# Patient Record
Sex: MALE | Race: WHITE | NOT HISPANIC OR LATINO | Employment: UNEMPLOYED | URBAN - METROPOLITAN AREA
[De-identification: names, ages, dates, MRNs, and addresses within clinical notes are randomized per-mention and may not be internally consistent; named-entity substitution may affect disease eponyms.]

---

## 2017-01-31 ENCOUNTER — TELEPHONE (OUTPATIENT)
Dept: FAMILY MEDICINE | Facility: OTHER | Age: 67
End: 2017-01-31

## 2017-01-31 NOTE — Clinical Note
[address]   [facility] [address]  Phone: [phone]  February 1, 2017      Genaro Barbosa  [address] [facility] [address]      Dear Genaro,    We care about your health and have reviewed your health plan including your medical conditions, medications, and lab results.  Based on this review, it is recommended that you follow up regarding the following health topic(s):  -Diabetes    We recommend you take the following action(s):  -schedule a FOLLOWUP OFFICE APPOINTMENT.  We will perform the following labs:  A1c, Lipids (fasting cholesterol - nothing to eat except water and/or meds for 8-10 hours), CMP(complete metabolic panel) and Microablumin.     Please call us at the [facility] - [phone] (or use [facility]) to address the above recommendations.     Thank you for trusting [facility] and we appreciate the opportunity to serve you.  We look forward to supporting your healthcare needs in the future.    Healthy Regards,    Your Health Care Team  [facility]

## 2017-01-31 NOTE — TELEPHONE ENCOUNTER
Summary:    Patient is due/failing the following:   Eye and foot exam, CMP, Microalbumin, A1C, FOLLOW UP, FIT and LDL    Action needed:   Patient needs office visit for diabetic follow up., Patient needs fasting lab only appointment and complete a FIT test     Type of outreach:    Phone, left message for patient to call back.     Questions for provider review:    None                                                                                                                                    Judith Arriola       Chart routed to Care Team .        Panel Management Review      Patient has the following on his problem list:     Diabetes    ASA: Passed    Last A1C  A1C      7.0   5/9/2016  A1C      6.9   11/5/2015  A1C      7.2   4/22/2015  A1C      6.7   9/16/2014  A1C      7.3   4/16/2014  A1C tested: Passed    Last LDL:    CHOL      102   5/9/2016  HDL       50   5/9/2016  LDL       35   5/9/2016  TRIG       86   5/9/2016  CHOLHDLRATIO      3.6   4/22/2015  NHDL       52   5/9/2016    Is the patient on a Statin? YES             Is the patient on Aspirin? YES    Medications     HMG CoA Reductase Inhibitors    rosuvastatin (CRESTOR) 40 MG tablet    Salicylates    ASPIRIN 325 MG OR TBEC          Last three blood pressure readings:  BP Readings from Last 3 Encounters:   12/21/16 157/69   12/12/16 130/84   10/03/16 130/70        Tobacco History:     History   Smoking status     Former Smoker   Smokeless tobacco     Never Used     Comment: quit age 26           Hypertension   Last three blood pressure readings:  BP Readings from Last 3 Encounters:   12/21/16 157/69   12/12/16 130/84   10/03/16 130/70     Blood pressure: Failed    HTN Guidelines:  Age 18-59 BP range:  Less than 140/90  Age 60-85 with Diabetes:  Less than 140/90  Age 60-85 without Diabetes:  less than 150/90      Composite cancer screening  Chart review shows that this patient is due/due soon for the following Fecal Colorectal (FIT)

## 2017-03-07 ENCOUNTER — TELEPHONE (OUTPATIENT)
Dept: FAMILY MEDICINE | Facility: OTHER | Age: 67
End: 2017-03-07
Payer: COMMERCIAL
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
## 2017-04-24 ENCOUNTER — OFFICE VISIT (OUTPATIENT)
Dept: FAMILY MEDICINE | Facility: OTHER | Age: 67
End: 2017-04-24
Payer: COMMERCIAL

## 2017-04-24 VITALS
DIASTOLIC BLOOD PRESSURE: 84 MMHG | TEMPERATURE: 99.7 F | SYSTOLIC BLOOD PRESSURE: 132 MMHG | RESPIRATION RATE: 18 BRPM | WEIGHT: 253 LBS | HEART RATE: 68 BPM | HEIGHT: 71 IN | BODY MASS INDEX: 35.42 KG/M2

## 2017-04-24 DIAGNOSIS — Z23 NEED FOR PROPHYLACTIC VACCINATION AGAINST STREPTOCOCCUS PNEUMONIAE (PNEUMOCOCCUS): ICD-10-CM

## 2017-04-24 DIAGNOSIS — Z13.89 SCREENING FOR DIABETIC PERIPHERAL NEUROPATHY: ICD-10-CM

## 2017-04-24 DIAGNOSIS — I25.10 CORONARY ARTERY DISEASE INVOLVING NATIVE HEART WITHOUT ANGINA PECTORIS, UNSPECIFIED VESSEL OR LESION TYPE: ICD-10-CM

## 2017-04-24 DIAGNOSIS — E11.59 TYPE 2 DIABETES MELLITUS WITH OTHER CIRCULATORY COMPLICATIONS (H): Primary | ICD-10-CM

## 2017-04-24 DIAGNOSIS — M25.519 SHOULDER PAIN, UNSPECIFIED CHRONICITY, UNSPECIFIED LATERALITY: ICD-10-CM

## 2017-04-24 DIAGNOSIS — Z11.59 NEED FOR HEPATITIS C SCREENING TEST: ICD-10-CM

## 2017-04-24 DIAGNOSIS — Z12.11 SCREEN FOR COLON CANCER: ICD-10-CM

## 2017-04-24 LAB — HBA1C MFR BLD: 7.2 % (ref 4.3–6)

## 2017-04-24 PROCEDURE — 80048 BASIC METABOLIC PNL TOTAL CA: CPT | Performed by: FAMILY MEDICINE

## 2017-04-24 PROCEDURE — 83036 HEMOGLOBIN GLYCOSYLATED A1C: CPT | Performed by: FAMILY MEDICINE

## 2017-04-24 PROCEDURE — 82043 UR ALBUMIN QUANTITATIVE: CPT | Performed by: FAMILY MEDICINE

## 2017-04-24 PROCEDURE — 99207 C FOOT EXAM  NO CHARGE: CPT | Performed by: FAMILY MEDICINE

## 2017-04-24 PROCEDURE — 99214 OFFICE O/P EST MOD 30 MIN: CPT | Mod: GW | Performed by: FAMILY MEDICINE

## 2017-04-24 PROCEDURE — 36415 COLL VENOUS BLD VENIPUNCTURE: CPT | Performed by: FAMILY MEDICINE

## 2017-04-24 RX ORDER — GLYBURIDE 5 MG/1
TABLET ORAL
Qty: 360 TABLET | Refills: 0 | Status: SHIPPED | OUTPATIENT
Start: 2017-04-24 | End: 2017-07-22

## 2017-04-24 RX ORDER — PIOGLITAZONEHYDROCHLORIDE 45 MG/1
45 TABLET ORAL DAILY
Qty: 90 TABLET | Refills: 0 | Status: SHIPPED | OUTPATIENT
Start: 2017-04-24 | End: 2017-07-22

## 2017-04-24 ASSESSMENT — PAIN SCALES - GENERAL: PAINLEVEL: NO PAIN (0)

## 2017-04-24 NOTE — NURSING NOTE
"Chief Complaint   Patient presents with     Diabetes     Panel Management     A1C, Micro, Eye Exam, Foot Exam, CMP, Lipid, Prevnar, Colon, Honoring Choices, Hep C, Fall Assessment       Initial /84 (BP Location: Left arm, Patient Position: Chair, Cuff Size: Adult Large)  Pulse 68  Temp 99.7  F (37.6  C) (Temporal)  Resp 18  Ht 5' 11\" (1.803 m)  Wt 253 lb (114.8 kg)  BMI 35.29 kg/m2 Estimated body mass index is 35.29 kg/(m^2) as calculated from the following:    Height as of this encounter: 5' 11\" (1.803 m).    Weight as of this encounter: 253 lb (114.8 kg).  Medication Reconciliation: complete  "

## 2017-04-24 NOTE — PATIENT INSTRUCTIONS
Lifestyle changes regarding your diet and exercise can help during your winter months. Diabetic education referral.    -Follow up in 3 months.  -Hep C screening during next labs.

## 2017-04-24 NOTE — MR AVS SNAPSHOT
After Visit Summary   4/24/2017    Genaro Barbosa    MRN: [id number]           Patient Information     Date Of Birth          1950        Visit Information        Provider Department      4/24/2017 3:30 PM Mayelin Hernandez MD [facility]        Today's Diagnoses     Type 2 diabetes mellitus with other circulatory complications (H)    -  1    Screen for colon cancer        Need for hepatitis C screening test        Screening for diabetic peripheral neuropathy        Need for prophylactic vaccination against Streptococcus pneumoniae (pneumococcus)        Coronary artery disease involving native heart without angina pectoris, unspecified vessel or lesion type        Shoulder pain, unspecified chronicity, unspecified laterality          Care Instructions    Lifestyle changes regarding your diet and exercise can help during your winter months. Diabetic education referral.    -Follow up in 3 months.  -Hep C screening during next labs.        Follow-ups after your visit        Additional Services     DIABETES EDUCATOR REFERRAL       DIABETES SELF MANAGEMENT TRAINING (DSMT)      Your provider has referred you to Diabetes Education: [facility]: Diabetes Education - [facility] [phone]   [email or web address]    Type of training and number of hours: Previous Diagnosis: Follow-up DSMT - 2 hours.    Medicare covers: 10 hours of initial DSMT in 12 month period from the time of first visit, plus 2 hours of follow-up DSMT annually, and additional hours as requested for insulin training.    Diabetes Type: Type 2 - On Oral Medication             Diabetes Co-Morbidities: atherosclerotic cardiovascular disease               A1C Goal:  <7.0       A1C is: Lab Results       Component                Value               Date                       A1C                      7.2                 04/24/2017              If an urgent visit is needed or A1C is  above 12, Care Team to call the Diabetes Education Team at [phone] or send an In Basket message to the Diabetes Education Pool (P DIAB ED-PATIENT CARE).    Diabetes Education Topics: Comprehensive Knowledge Assessment and Instruction    Special Educational Needs Requiring Individual DSMT: None       MEDICAL NUTRITION THERAPY (MNT) for Diabetes    Medical Nutrition Therapy with a Registered Dietitian can be provided in coordination with Diabetes Self-Management Training to assist in achieving optimal diabetes management.    MNT Type and Hours: Previous diagnosis: Annual follow-up MNT - 2 hours                       Medicare will cover: 3 hours initial MNT in 12 month period after first visit, plus 2 hours of follow-up MNT annually    Please be aware that coverage of these services is subject to the terms and limitations of your health insurance plan.  Call member services at your health plan to determine Diabetes Self-Management Training benefits and ask which blood glucose monitor brands are covered by your plan.      Please bring the following with you to your appointment:    (1)  List of current medications   (2)  List of Blood Glucose Monitor brands that are covered by your insurance plan  (3)  Blood Glucose Monitor and log book  (4)   Food records for the 3 days prior to your visit    The Certified Diabetes Educator may make diabetes medication adjustments per the CDE Protocol and Collaborative Practice Agreement.            ORTHOPEDICS ADULT REFERRAL       Your provider has referred you to: [facility]: [facility]  [phone]   [email or web address]    Please be aware that coverage of these services is subject to the terms and limitations of your health insurance plan.  Call member services at your health plan with any benefit or coverage questions.      Please bring the following to your appointment:    >>   Any x-rays, CTs or MRIs which have been performed.  Contact  the facility where they were done to arrange for  prior to your scheduled appointment.    >>   List of current medications   >>   This referral request   >>   Any documents/labs given to you for this referral                  Future tests that were ordered for you today     Open Future Orders        Priority Expected Expires Ordered    Fecal colorectal cancer screen (FIT) Routine 5/15/2017 7/17/2017 4/24/2017    Lipid Profile with reflex to direct LDL Routine  5/24/2017 4/24/2017    Hepatitis C Screen Reflex to HCV RNA Quant and Genotype Routine  5/24/2017 4/24/2017            Who to contact     If you have questions or need follow up information about today's clinic visit or your schedule please contact [facility] directly at [phone].  Normal or non-critical lab and imaging results will be communicated to you by MyChart, letter or phone within 4 business days after the clinic has received the results. If you do not hear from us within 7 days, please contact the clinic through [employer] or phone. If you have a critical or abnormal lab result, we will notify you by phone as soon as possible.  Submit refill requests through [employer] or call your pharmacy and they will forward the refill request to us. Please allow 3 business days for your refill to be completed.          Additional Information About Your Visit        [employer] Information     [employer] gives you secure access to your electronic health record. If you see a primary care provider, you can also send messages to your care team and make appointments. If you have questions, please call your primary care clinic.  If you do not have a primary care provider, please call [phone] and they will assist you.        Care EveryWhere ID     This is your Care EveryWhere ID. This could be used by other organizations to access your [place] medical records  [id number]        Your Vitals Were     Pulse Temperature Respirations Height BMI (Body  "Mass Index)       68 99.7  F (37.6  C) (Temporal) 18 5' 11\" (1.803 m) 35.29 kg/m2        Blood Pressure from Last 3 Encounters:   04/24/17 132/84   12/21/16 157/69   12/12/16 130/84    Weight from Last 3 Encounters:   04/24/17 253 lb (114.8 kg)   12/21/16 256 lb (116.1 kg)   12/12/16 253 lb 8 oz (115 kg)              We Performed the Following     Basic metabolic panel     DIABETES EDUCATOR REFERRAL     FOOT EXAM  NO CHARGE [50760.114]     Hemoglobin A1c     Microalbumin quantitative random urine     ORTHOPEDICS ADULT REFERRAL          Today's Medication Changes          These changes are accurate as of: 4/24/17  4:11 PM.  If you have any questions, ask your nurse or doctor.               These medicines have changed or have updated prescriptions.        Dose/Directions    glyBURIDE 5 MG tablet   Commonly known as:  DIABETA /MICRONASE   This may have changed:  See the new instructions.   Used for:  Type 2 diabetes mellitus with other circulatory complications (H)   Changed by:  Mayelin Hernandez MD        TAKE TWO TABLETS BY MOUTH TWICE DAILY WITH MEALS   Quantity:  360 tablet   Refills:  0       metFORMIN 500 MG tablet   Commonly known as:  GLUCOPHAGE   This may have changed:  See the new instructions.   Used for:  Type 2 diabetes mellitus with other circulatory complications (H)   Changed by:  Mayelin Hernandez MD        TAKE TWO TABLETS BY MOUTH TWICE DAILY WITH MEALS   Quantity:  360 tablet   Refills:  0       pioglitazone 45 MG tablet   Commonly known as:  ACTOS   This may have changed:  See the new instructions.   Used for:  Type 2 diabetes mellitus with other circulatory complications (H)   Changed by:  Mayelin Hernandez MD        Dose:  45 mg   Take 1 tablet (45 mg) by mouth daily   Quantity:  90 tablet   Refills:  0         Stop taking these medicines if you haven't already. Please contact your care team if you have questions.     fluticasone 50 MCG/ACT spray   Commonly known as:  FLONASE   Stopped by:  " Mayelin Hernandez MD           lisinopril-hydrochlorothiazide 20-25 MG per tablet   Commonly known as:  PRINZIDE/ZESTORETIC   Stopped by:  Mayelin Hernandez MD           omeprazole 20 MG tablet   Stopped by:  Mayelin Hernandez MD                Where to get your medicines      These medications were sent to [facility] [address] [facility] - [place], [place] - [address]  [address] [address]     Phone:  [phone]     blood glucose monitoring test strip    glyBURIDE 5 MG tablet    metFORMIN 500 MG tablet    pioglitazone 45 MG tablet                Primary Care Provider Office Phone # Fax #    Mayelin Hernandez [phone]       [facility]  [address] [facility] [address]        Thank you!     Thank you for choosing [facility]  for your care. Our goal is always to provide you with excellent care. Hearing back from our patients is one way we can continue to improve our services. Please take a few minutes to complete the written survey that you may receive in the mail after your visit with us. Thank you!             Your Updated Medication List - Protect others around you: Learn how to safely use, store and throw away your medicines at www.disposemymeds.org.          This list is accurate as of: 4/24/17  4:11 PM.  Always use your most recent med list.                   Brand Name Dispense Instructions for use    aspirin 325 MG EC tablet     0    1 tab po QD (Once per day)       blood glucose monitoring test strip    no brand specified    3 Box    1 strip by In Vitro route 2 times daily Test as directed       clopidogrel 75 MG tablet    PLAVIX    90 tablet    TAKE  ONE TABLET BY MOUTH EVERY DAY       CRESTOR 40 MG tablet   Generic drug:  rosuvastatin     90 tablet    Take 1 tablet (40 mg) by mouth daily       glyBURIDE 5 MG tablet    DIABETA /MICRONASE    360 tablet    TAKE TWO TABLETS BY MOUTH TWICE DAILY WITH MEALS       ibuprofen 200 MG tablet    ADVIL/MOTRIN      Take 1,000 mg by mouth nightly as needed.       lisinopril 20 MG tablet    PRINIVIL/ZESTRIL    90 tablet    Take 1 tablet (20 mg) by mouth daily       metFORMIN 500 MG tablet    GLUCOPHAGE    360 tablet    TAKE TWO TABLETS BY MOUTH TWICE DAILY WITH MEALS       metoprolol 50 MG tablet    LOPRESSOR    180 tablet    Take 1 tablet (50 mg) by mouth 2 times daily       order for DME     1 packet    Glucose test strips Use 1-2 x daily       pioglitazone 45 MG tablet    ACTOS    90 tablet    Take 1 tablet (45 mg) by mouth daily       Pycnogenol 50 MG Caps     120 Cap    taking OPC3 2 caps per day

## 2017-04-25 LAB
ANION GAP SERPL CALCULATED.3IONS-SCNC: 7 MMOL/L (ref 3–14)
BUN SERPL-MCNC: 15 MG/DL (ref 7–30)
CALCIUM SERPL-MCNC: 8.7 MG/DL (ref 8.5–10.1)
CHLORIDE SERPL-SCNC: 106 MMOL/L (ref 94–109)
CO2 SERPL-SCNC: 27 MMOL/L (ref 20–32)
CREAT SERPL-MCNC: 0.94 MG/DL (ref 0.66–1.25)
CREAT UR-MCNC: 126 MG/DL
GFR SERPL CREATININE-BSD FRML MDRD: 80 ML/MIN/1.7M2
GLUCOSE SERPL-MCNC: 193 MG/DL (ref 70–99)
MICROALBUMIN UR-MCNC: 89 MG/L
MICROALBUMIN/CREAT UR: 70.95 MG/G CR (ref 0–17)
POTASSIUM SERPL-SCNC: 4.1 MMOL/L (ref 3.4–5.3)
SODIUM SERPL-SCNC: 140 MMOL/L (ref 133–144)

## 2017-04-26 ENCOUNTER — TELEPHONE (OUTPATIENT)
Dept: EDUCATION SERVICES | Facility: OTHER | Age: 67
End: 2017-04-26

## 2017-04-26 NOTE — TELEPHONE ENCOUNTER
Message received from Dr. Hernandez to contact Genaro and schedule for diabetes education.    LM message for Genaro to contact diabetes education to schedule an appointment ([phone]).    Katie Siu RD, LD, CDE

## 2017-04-26 NOTE — PROGRESS NOTES
Genaro, your results show worsening kidney functions.  Please schedule with diabetic ed to help get under control.  Please let me know if you have any questions.    Mayelin Hernandez MD

## 2017-04-27 ENCOUNTER — MYC MEDICAL ADVICE (OUTPATIENT)
Dept: FAMILY MEDICINE | Facility: OTHER | Age: 67
End: 2017-04-27

## 2017-04-29 PROCEDURE — 82274 ASSAY TEST FOR BLOOD FECAL: CPT | Performed by: FAMILY MEDICINE

## 2017-04-30 DIAGNOSIS — E11.59 TYPE 2 DIABETES MELLITUS WITH OTHER CIRCULATORY COMPLICATIONS (H): ICD-10-CM

## 2017-05-01 ENCOUNTER — MYC MEDICAL ADVICE (OUTPATIENT)
Dept: FAMILY MEDICINE | Facility: OTHER | Age: 67
End: 2017-05-01

## 2017-05-01 DIAGNOSIS — Z12.11 SCREEN FOR COLON CANCER: ICD-10-CM

## 2017-05-01 NOTE — TELEPHONE ENCOUNTER
Duplicate request?    Pioglitazone 45 mg         Last Written Prescription Date: 04/24/2017  Last Fill Quantity: 90, # refills: 0  Last Office Visit with [facility], [facility] or [facility] prescribing provider:  04/24/2017        BP Readings from Last 3 Encounters:   04/24/17 132/84   12/21/16 157/69   12/12/16 130/84     Lab Results   Component Value Date    MICROL 89 04/24/2017     Lab Results   Component Value Date    UMALCR 70.95 04/24/2017     Creatinine   Date Value Ref Range Status   04/24/2017 0.94 0.66 - 1.25 mg/dL Final   ]  GFR Estimate   Date Value Ref Range Status   04/24/2017 80 >60 mL/min/1.7m2 Final     Comment:     Non  GFR Calc   11/05/2015 76 >60 mL/min/1.7m2 Final     Comment:     Non  GFR Calc   09/16/2014 78 >60 mL/min/1.7m2 Final     Comment:     Non  GFR Calc     GFR Estimate If Black   Date Value Ref Range Status   04/24/2017 >90   GFR Calc   >60 mL/min/1.7m2 Final   11/05/2015 >90   GFR Calc   >60 mL/min/1.7m2 Final   09/16/2014 >90   GFR Calc   >60 mL/min/1.7m2 Final     Lab Results   Component Value Date    CHOL 102 05/09/2016     Lab Results   Component Value Date    HDL 50 05/09/2016     Lab Results   Component Value Date    LDL 35 05/09/2016     Lab Results   Component Value Date    TRIG 86 05/09/2016     Lab Results   Component Value Date    CHOLHDLRATIO 3.6 04/22/2015     Lab Results   Component Value Date    AST 23 11/05/2015     Lab Results   Component Value Date    ALT 31 11/05/2015     Lab Results   Component Value Date    A1C 7.2 04/24/2017    A1C 7.0 05/09/2016    A1C 6.9 11/05/2015    A1C 7.2 04/22/2015    A1C 6.7 09/16/2014     Potassium   Date Value Ref Range Status   04/24/2017 4.1 3.4 - 5.3 mmol/L Final     Metformin 500 mg         Last Written Prescription Date: 04/24/2017  Last Fill Quantity: 360, # refills: 0  Last Office Visit with [facility], [facility] or [facility] prescribing provider:  04/24/2017         BP Readings from Last 3 Encounters:   04/24/17 132/84   12/21/16 157/69   12/12/16 130/84     Lab Results   Component Value Date    MICROL 89 04/24/2017     Lab Results   Component Value Date    UMALCR 70.95 04/24/2017     Creatinine   Date Value Ref Range Status   04/24/2017 0.94 0.66 - 1.25 mg/dL Final   ]  GFR Estimate   Date Value Ref Range Status   04/24/2017 80 >60 mL/min/1.7m2 Final     Comment:     Non  GFR Calc   11/05/2015 76 >60 mL/min/1.7m2 Final     Comment:     Non  GFR Calc   09/16/2014 78 >60 mL/min/1.7m2 Final     Comment:     Non  GFR Calc     GFR Estimate If Black   Date Value Ref Range Status   04/24/2017 >90   GFR Calc   >60 mL/min/1.7m2 Final   11/05/2015 >90   GFR Calc   >60 mL/min/1.7m2 Final   09/16/2014 >90   GFR Calc   >60 mL/min/1.7m2 Final     Lab Results   Component Value Date    CHOL 102 05/09/2016     Lab Results   Component Value Date    HDL 50 05/09/2016     Lab Results   Component Value Date    LDL 35 05/09/2016     Lab Results   Component Value Date    TRIG 86 05/09/2016     Lab Results   Component Value Date    CHOLHDLRATIO 3.6 04/22/2015     Lab Results   Component Value Date    AST 23 11/05/2015     Lab Results   Component Value Date    ALT 31 11/05/2015     Lab Results   Component Value Date    A1C 7.2 04/24/2017    A1C 7.0 05/09/2016    A1C 6.9 11/05/2015    A1C 7.2 04/22/2015    A1C 6.7 09/16/2014     Potassium   Date Value Ref Range Status   04/24/2017 4.1 3.4 - 5.3 mmol/L Final

## 2017-05-02 DIAGNOSIS — I25.10 CORONARY ARTERY DISEASE INVOLVING NATIVE HEART WITHOUT ANGINA PECTORIS, UNSPECIFIED VESSEL OR LESION TYPE: ICD-10-CM

## 2017-05-02 DIAGNOSIS — Z11.59 NEED FOR HEPATITIS C SCREENING TEST: ICD-10-CM

## 2017-05-02 DIAGNOSIS — E11.59 TYPE 2 DIABETES MELLITUS WITH OTHER CIRCULATORY COMPLICATIONS (H): ICD-10-CM

## 2017-05-02 LAB
CHOLEST SERPL-MCNC: 110 MG/DL
HCV AB SERPL QL IA: NORMAL
HDLC SERPL-MCNC: 42 MG/DL
HEMOCCULT STL QL IA: NEGATIVE
LDLC SERPL CALC-MCNC: 44 MG/DL
NONHDLC SERPL-MCNC: 68 MG/DL
TRIGL SERPL-MCNC: 120 MG/DL

## 2017-05-02 PROCEDURE — 80061 LIPID PANEL: CPT | Performed by: FAMILY MEDICINE

## 2017-05-02 PROCEDURE — 36415 COLL VENOUS BLD VENIPUNCTURE: CPT | Performed by: FAMILY MEDICINE

## 2017-05-02 PROCEDURE — 86803 HEPATITIS C AB TEST: CPT | Performed by: FAMILY MEDICINE

## 2017-05-02 PROCEDURE — 83036 HEMOGLOBIN GLYCOSYLATED A1C: CPT | Performed by: FAMILY MEDICINE

## 2017-05-02 RX ORDER — PIOGLITAZONEHYDROCHLORIDE 45 MG/1
TABLET ORAL
Qty: 90 TABLET | Refills: 1 | OUTPATIENT
Start: 2017-05-02

## 2017-05-03 LAB — HBA1C MFR BLD: NORMAL % (ref 4.3–6)

## 2017-05-03 NOTE — PROGRESS NOTES
Genaro, your results were all normal.    Please let me know if you have any questions.    Mayelin Hernandez MD

## 2017-05-18 ENCOUNTER — TELEPHONE (OUTPATIENT)
Dept: FAMILY MEDICINE | Facility: OTHER | Age: 67
End: 2017-05-18

## 2017-05-18 NOTE — TELEPHONE ENCOUNTER
Requested Provider:  anyone    PCP: Mayelin Hernandez    Reason for visit: cough    Duration of symptoms: 2 days    Have you been treated for this in the past? No    Additional comments:

## 2017-05-18 NOTE — TELEPHONE ENCOUNTER
Line was busy, LM for the patient to return call to the clinic to discuss the below. Will await to hear from patient. Fide Nino RN, BSN

## 2017-05-19 ENCOUNTER — RADIANT APPOINTMENT (OUTPATIENT)
Dept: GENERAL RADIOLOGY | Facility: OTHER | Age: 67
End: 2017-05-19
Attending: FAMILY MEDICINE
Payer: COMMERCIAL

## 2017-05-19 ENCOUNTER — OFFICE VISIT (OUTPATIENT)
Dept: FAMILY MEDICINE | Facility: OTHER | Age: 67
End: 2017-05-19
Payer: COMMERCIAL

## 2017-05-19 VITALS
HEART RATE: 65 BPM | OXYGEN SATURATION: 95 % | TEMPERATURE: 97.5 F | SYSTOLIC BLOOD PRESSURE: 132 MMHG | DIASTOLIC BLOOD PRESSURE: 80 MMHG | BODY MASS INDEX: 34.87 KG/M2 | RESPIRATION RATE: 16 BRPM | WEIGHT: 250 LBS

## 2017-05-19 DIAGNOSIS — B37.0 THRUSH: ICD-10-CM

## 2017-05-19 DIAGNOSIS — J98.01 ACUTE BRONCHOSPASM: ICD-10-CM

## 2017-05-19 DIAGNOSIS — R05.9 COUGH: Primary | ICD-10-CM

## 2017-05-19 DIAGNOSIS — R05.9 COUGH: ICD-10-CM

## 2017-05-19 DIAGNOSIS — J20.9 ACUTE BRONCHITIS, UNSPECIFIED ORGANISM: ICD-10-CM

## 2017-05-19 PROCEDURE — 71020 XR CHEST 2 VW: CPT

## 2017-05-19 PROCEDURE — 99213 OFFICE O/P EST LOW 20 MIN: CPT | Performed by: FAMILY MEDICINE

## 2017-05-19 RX ORDER — PREDNISONE 20 MG/1
20 TABLET ORAL DAILY
Qty: 5 TABLET | Refills: 0 | Status: SHIPPED | OUTPATIENT
Start: 2017-05-19 | End: 2018-04-11

## 2017-05-19 RX ORDER — ALBUTEROL SULFATE 90 UG/1
2 AEROSOL, METERED RESPIRATORY (INHALATION) EVERY 6 HOURS
Qty: 18 G | Refills: 3 | Status: SHIPPED | OUTPATIENT
Start: 2017-05-19 | End: 2019-03-28

## 2017-05-19 RX ORDER — AZITHROMYCIN 250 MG/1
TABLET, FILM COATED ORAL
Qty: 6 TABLET | Refills: 0 | Status: SHIPPED | OUTPATIENT
Start: 2017-05-19 | End: 2018-04-11

## 2017-05-19 RX ORDER — NYSTATIN 100000/ML
500000 SUSPENSION, ORAL (FINAL DOSE FORM) ORAL 4 TIMES DAILY
Qty: 240 ML | Refills: 0 | Status: SHIPPED | OUTPATIENT
Start: 2017-05-19 | End: 2017-10-18

## 2017-05-19 ASSESSMENT — PAIN SCALES - GENERAL: PAINLEVEL: NO PAIN (0)

## 2017-05-19 NOTE — MR AVS SNAPSHOT
After Visit Summary   5/19/2017    Genaro Barbosa    MRN: [id number]           Patient Information     Date Of Birth          1950        Visit Information        Provider Department      5/19/2017 10:20 AM Rosalinda Whitney MD [facility]        Today's Diagnoses     Cough    -  1    Thrush        Acute bronchospasm        Acute bronchitis, unspecified organism           Follow-ups after your visit        Who to contact     If you have questions or need follow up information about today's clinic visit or your schedule please contact [facility] directly at [phone].  Normal or non-critical lab and imaging results will be communicated to you by [employer], letter or phone within 4 business days after the clinic has received the results. If you do not hear from us within 7 days, please contact the clinic through [employer] or phone. If you have a critical or abnormal lab result, we will notify you by phone as soon as possible.  Submit refill requests through [employer] or call your pharmacy and they will forward the refill request to us. Please allow 3 business days for your refill to be completed.          Additional Information About Your Visit        MyChart Information     [employer] gives you secure access to your electronic health record. If you see a primary care provider, you can also send messages to your care team and make appointments. If you have questions, please call your primary care clinic.  If you do not have a primary care provider, please call [phone] and they will assist you.        Care EveryWhere ID     This is your Care EveryWhere ID. This could be used by other organizations to access your [facility] medical records  [id number]        Your Vitals Were     Pulse Temperature Respirations Pulse Oximetry BMI (Body Mass Index)       65 97.5  F (36.4  C) (Oral) 16 95% 34.87 kg/m2        Blood Pressure from Last 3 Encounters:   05/19/17 132/80   04/24/17  132/84   12/21/16 157/69    Weight from Last 3 Encounters:   05/19/17 250 lb (113.4 kg)   04/24/17 253 lb (114.8 kg)   12/21/16 256 lb (116.1 kg)                 Today's Medication Changes          These changes are accurate as of: 5/19/17 11:03 AM.  If you have any questions, ask your nurse or doctor.               Start taking these medicines.        Dose/Directions    albuterol 108 (90 BASE) MCG/ACT Inhaler   Commonly known as:  albuterol   Used for:  Acute bronchospasm   Started by:  Rosalinda Whitney MD        Dose:  2 puff   Inhale 2 puffs into the lungs every 6 hours   Quantity:  18 g   Refills:  3       azithromycin 250 MG tablet   Commonly known as:  ZITHROMAX   Used for:  Cough, Acute bronchitis, unspecified organism   Started by:  Rosalinda Whitney MD        Two tablets first day, then one tablet daily for four days.   Quantity:  6 tablet   Refills:  0       nystatin [id number] UNIT/ML suspension   Commonly known as:  MYCOSTATIN   Used for:  Thrush   Started by:  Rosalinda Whitney MD        Dose:  053749 Units   Take 5 mLs (500,000 Units) by mouth 4 times daily   Quantity:  240 mL   Refills:  0       predniSONE 20 MG tablet   Commonly known as:  DELTASONE   Used for:  Acute bronchitis, unspecified organism, Acute bronchospasm   Started by:  Rosalinda Whitney MD        Dose:  20 mg   Take 1 tablet (20 mg) by mouth daily   Quantity:  5 tablet   Refills:  0            Where to get your medicines      These medications were sent to [place] [address] [place] [facility] [place] - [address] [address] [place] [address]  [address] [address] [place] [address]     Phone:  [phone]     albuterol 108 (90 BASE) MCG/ACT Inhaler    azithromycin 250 MG tablet    nystatin [id number] UNIT/ML suspension    predniSONE 20 MG tablet                Primary Care Provider Office Phone # Fax #    Mayelin Hernandez [phone]       [facility]  [address] [facility] [address]        Thank you!     Thank you for choosing [facility] [place]  [facility]  for your care. Our goal is always to provide you with excellent care. Hearing back from our patients is one way we can continue to improve our services. Please take a few minutes to complete the written survey that you may receive in the mail after your visit with us. Thank you!             Your Updated Medication List - Protect others around you: Learn how to safely use, store and throw away your medicines at www.disposemymeds.org.          This list is accurate as of: 5/19/17 11:03 AM.  Always use your most recent med list.                   Brand Name Dispense Instructions for use    albuterol 108 (90 BASE) MCG/ACT Inhaler    albuterol    18 g    Inhale 2 puffs into the lungs every 6 hours       aspirin 325 MG EC tablet     0    1 tab po QD (Once per day)       azithromycin 250 MG tablet    ZITHROMAX    6 tablet    Two tablets first day, then one tablet daily for four days.       blood glucose monitoring test strip    no brand specified    3 Box    1 strip by In Vitro route 2 times daily Test as directed       clopidogrel 75 MG tablet    PLAVIX    90 tablet    TAKE  ONE TABLET BY MOUTH EVERY DAY       CRESTOR 40 MG tablet   Generic drug:  rosuvastatin     90 tablet    Take 1 tablet (40 mg) by mouth daily       glyBURIDE 5 MG tablet    DIABETA /MICRONASE    360 tablet    TAKE TWO TABLETS BY MOUTH TWICE DAILY WITH MEALS       ibuprofen 200 MG tablet    ADVIL/MOTRIN     Take 1,000 mg by mouth nightly as needed.       lisinopril 20 MG tablet    PRINIVIL/ZESTRIL    90 tablet    Take 1 tablet (20 mg) by mouth daily       metFORMIN 500 MG tablet    GLUCOPHAGE    360 tablet    TAKE TWO TABLETS BY MOUTH TWICE DAILY WITH MEALS       metoprolol 50 MG tablet    LOPRESSOR    180 tablet    Take 1 tablet (50 mg) by mouth 2 times daily       nystatin [id number] UNIT/ML suspension    MYCOSTATIN    240 mL    Take 5 mLs (500,000 Units) by mouth 4 times daily       order for DME     1 packet    Glucose test strips Use 1-2 x  daily       pioglitazone 45 MG tablet    ACTOS    90 tablet    Take 1 tablet (45 mg) by mouth daily       predniSONE 20 MG tablet    DELTASONE    5 tablet    Take 1 tablet (20 mg) by mouth daily       Pycnogenol 50 MG Caps     120 Cap    taking OPC3 2 caps per day

## 2017-05-19 NOTE — NURSING NOTE
"Chief Complaint   Patient presents with     Cough     Health Maintenance       Initial /80 (BP Location: Right arm, Patient Position: Chair, Cuff Size: Adult Large)  Pulse 65  Temp 97.5  F (36.4  C) (Oral)  Resp 16  Wt 250 lb (113.4 kg)  SpO2 95%  BMI 34.87 kg/m2 Estimated body mass index is 34.87 kg/(m^2) as calculated from the following:    Height as of 4/24/17: 5' 11\" (1.803 m).    Weight as of this encounter: 250 lb (113.4 kg).  Medication Reconciliation: complete   Katie Santos CMA (AAMA)      "
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
## 2017-05-19 NOTE — TELEPHONE ENCOUNTER
LM to return call. Please schedule with a provider that has an opening today if patient agrees.  Laurie Lorenzo, cma

## 2017-05-22 ENCOUNTER — TRANSFERRED RECORDS (OUTPATIENT)
Dept: HEALTH INFORMATION MANAGEMENT | Facility: CLINIC | Age: 67
End: 2017-05-22

## 2017-07-22 ENCOUNTER — TELEPHONE (OUTPATIENT)
Dept: FAMILY MEDICINE | Facility: OTHER | Age: 67
End: 2017-07-22

## 2017-07-22 DIAGNOSIS — E11.59 TYPE 2 DIABETES MELLITUS WITH OTHER CIRCULATORY COMPLICATIONS (H): ICD-10-CM

## 2017-07-25 NOTE — TELEPHONE ENCOUNTER
Routing refill request to provider for review/approval because:  Labs out of range:  microalbumin  Due now for follow up - pended x 1 month, please route to pool to call for follow up.    Alexus Simmons, RN, BSN

## 2017-07-25 NOTE — TELEPHONE ENCOUNTER
Glyburide (Diabeta/Micronase) 5 MG         Last Written Prescription Date: 4/24/17  Last Fill Quantity: 360, # refills: 0  Last Office Visit with [facility], [facility] or [facility] prescribing provider:  5/19/17        BP Readings from Last 3 Encounters:   05/19/17 132/80   04/24/17 132/84   12/21/16 157/69     Lab Results   Component Value Date    MICROL 89 04/24/2017     Lab Results   Component Value Date    UMALCR 70.95 04/24/2017     Creatinine   Date Value Ref Range Status   04/24/2017 0.94 0.66 - 1.25 mg/dL Final   ]  GFR Estimate   Date Value Ref Range Status   04/24/2017 80 >60 mL/min/1.7m2 Final     Comment:     Non  GFR Calc   11/05/2015 76 >60 mL/min/1.7m2 Final     Comment:     Non  GFR Calc   09/16/2014 78 >60 mL/min/1.7m2 Final     Comment:     Non  GFR Calc     GFR Estimate If Black   Date Value Ref Range Status   04/24/2017 >90   GFR Calc   >60 mL/min/1.7m2 Final   11/05/2015 >90   GFR Calc   >60 mL/min/1.7m2 Final   09/16/2014 >90   GFR Calc   >60 mL/min/1.7m2 Final     Lab Results   Component Value Date    CHOL 110 05/02/2017     Lab Results   Component Value Date    HDL 42 05/02/2017     Lab Results   Component Value Date    LDL 44 05/02/2017     Lab Results   Component Value Date    TRIG 120 05/02/2017     Lab Results   Component Value Date    CHOLHDLRATIO 3.6 04/22/2015     Lab Results   Component Value Date    AST 23 11/05/2015     Lab Results   Component Value Date    ALT 31 11/05/2015     Lab Results   Component Value Date    A1C  05/02/2017     DEL  Test canceled by physician  CORRECTED ON 05/03 AT 0811: PREVIOUSLY REPORTED AS 7.4      A1C 7.2 04/24/2017    A1C 7.0 05/09/2016    A1C 6.9 11/05/2015    A1C 7.2 04/22/2015     Potassium   Date Value Ref Range Status   04/24/2017 4.1 3.4 - 5.3 mmol/L Final       Metformin (Glucophage) 500 MG         Last Written Prescription Date: 4/24/17  Last Fill Quantity: 360, #  refills: 0  [facility] Visit with [facility] or  Health prescribing provider:  5/19/17        BP Readings from Last 3 Encounters:   05/19/17 132/80   04/24/17 132/84   12/21/16 157/69     Lab Results   Component Value Date    MICROL 89 04/24/2017     Lab Results   Component Value Date    UMALCR 70.95 04/24/2017     Creatinine   Date Value Ref Range Status   04/24/2017 0.94 0.66 - 1.25 mg/dL Final   ]  GFR Estimate   Date Value Ref Range Status   04/24/2017 80 >60 mL/min/1.7m2 Final     Comment:     Non  GFR Calc   11/05/2015 76 >60 mL/min/1.7m2 Final     Comment:     Non  GFR Calc   09/16/2014 78 >60 mL/min/1.7m2 Final     Comment:     Non  GFR Calc     GFR Estimate If Black   Date Value Ref Range Status   04/24/2017 >90   GFR Calc   >60 mL/min/1.7m2 Final   11/05/2015 >90   GFR Calc   >60 mL/min/1.7m2 Final   09/16/2014 >90   GFR Calc   >60 mL/min/1.7m2 Final     Lab Results   Component Value Date    CHOL 110 05/02/2017     Lab Results   Component Value Date    HDL 42 05/02/2017     Lab Results   Component Value Date    LDL 44 05/02/2017     Lab Results   Component Value Date    TRIG 120 05/02/2017     Lab Results   Component Value Date    CHOLHDLRATIO 3.6 04/22/2015     Lab Results   Component Value Date    AST 23 11/05/2015     Lab Results   Component Value Date    ALT 31 11/05/2015     Lab Results   Component Value Date    A1C  05/02/2017     DEL  Test canceled by physician  CORRECTED ON 05/03 AT 0811: PREVIOUSLY REPORTED AS 7.4      A1C 7.2 04/24/2017    A1C 7.0 05/09/2016    A1C 6.9 11/05/2015    A1C 7.2 04/22/2015     Potassium   Date Value Ref Range Status   04/24/2017 4.1 3.4 - 5.3 mmol/L Final       Pioglitazone (Actos) 45 MG         Last Written Prescription Date: 4/24/17  Last Fill Quantity: 90, # refills: 0  Last Office Visit with [facility] or  Health prescribing provider:  5/19/17        BP Readings from  Last 3 Encounters:   05/19/17 132/80   04/24/17 132/84   12/21/16 157/69     Lab Results   Component Value Date    MICROL 89 04/24/2017     Lab Results   Component Value Date    UMALCR 70.95 04/24/2017     Creatinine   Date Value Ref Range Status   04/24/2017 0.94 0.66 - 1.25 mg/dL Final   ]  GFR Estimate   Date Value Ref Range Status   04/24/2017 80 >60 mL/min/1.7m2 Final     Comment:     Non  GFR Calc   11/05/2015 76 >60 mL/min/1.7m2 Final     Comment:     Non  GFR Calc   09/16/2014 78 >60 mL/min/1.7m2 Final     Comment:     Non  GFR Calc     GFR Estimate If Black   Date Value Ref Range Status   04/24/2017 >90   GFR Calc   >60 mL/min/1.7m2 Final   11/05/2015 >90   GFR Calc   >60 mL/min/1.7m2 Final   09/16/2014 >90   GFR Calc   >60 mL/min/1.7m2 Final     Lab Results   Component Value Date    CHOL 110 05/02/2017     Lab Results   Component Value Date    HDL 42 05/02/2017     Lab Results   Component Value Date    LDL 44 05/02/2017     Lab Results   Component Value Date    TRIG 120 05/02/2017     Lab Results   Component Value Date    CHOLHDLRATIO 3.6 04/22/2015     Lab Results   Component Value Date    AST 23 11/05/2015     Lab Results   Component Value Date    ALT 31 11/05/2015     Lab Results   Component Value Date    A1C  05/02/2017     DEL  Test canceled by physician  CORRECTED ON 05/03 AT 0811: PREVIOUSLY REPORTED AS 7.4      A1C 7.2 04/24/2017    A1C 7.0 05/09/2016    A1C 6.9 11/05/2015    A1C 7.2 04/22/2015     Potassium   Date Value Ref Range Status   04/24/2017 4.1 3.4 - 5.3 mmol/L Final

## 2017-07-26 RX ORDER — PIOGLITAZONEHYDROCHLORIDE 45 MG/1
45 TABLET ORAL DAILY
Qty: 30 TABLET | Refills: 0 | Status: SHIPPED | OUTPATIENT
Start: 2017-07-26 | End: 2017-08-30

## 2017-07-26 RX ORDER — GLYBURIDE 5 MG/1
10 TABLET ORAL 2 TIMES DAILY WITH MEALS
Qty: 120 TABLET | Refills: 0 | Status: SHIPPED | OUTPATIENT
Start: 2017-07-26 | End: 2017-08-30

## 2017-07-26 NOTE — TELEPHONE ENCOUNTER
Uncontrolled at last visit.  OFfered dm ed and f/u in 3 months.  It is 3 months out now.  Please call for f/u.  Mayelin Hernandez MD

## 2017-07-28 NOTE — TELEPHONE ENCOUNTER
Spoke to patient and attempted to help patient schedule appointment. Patient states that it's too soon for a follow up visit. Patient states he wants to follow up, but does not want to do it until early fall and wondering if he can still get refills until early fall.    Will route to provider pool in AE's absence for review. Please advise.

## 2017-08-30 DIAGNOSIS — E11.59 TYPE 2 DIABETES MELLITUS WITH OTHER CIRCULATORY COMPLICATIONS (H): ICD-10-CM

## 2017-08-31 NOTE — TELEPHONE ENCOUNTER
glyBURIDE (DIABETA /MICRONASE) 5 MG tablet         Last Written Prescription Date: 7/26/17  Last Fill Quantity: 120, # refills: 0  Last Office Visit with G, P or [facility] prescribing provider:  5/19/17 BIN        BP Readings from Last 3 Encounters:   05/19/17 132/80   04/24/17 132/84   12/21/16 157/69     Lab Results   Component Value Date    MICROL 89 04/24/2017     Lab Results   Component Value Date    UMALCR 70.95 04/24/2017     Creatinine   Date Value Ref Range Status   04/24/2017 0.94 0.66 - 1.25 mg/dL Final   ]  GFR Estimate   Date Value Ref Range Status   04/24/2017 80 >60 mL/min/1.7m2 Final     Comment:     Non  GFR Calc   11/05/2015 76 >60 mL/min/1.7m2 Final     Comment:     Non  GFR Calc   09/16/2014 78 >60 mL/min/1.7m2 Final     Comment:     Non  GFR Calc     GFR Estimate If Black   Date Value Ref Range Status   04/24/2017 >90   GFR Calc   >60 mL/min/1.7m2 Final   11/05/2015 >90   GFR Calc   >60 mL/min/1.7m2 Final   09/16/2014 >90   GFR Calc   >60 mL/min/1.7m2 Final     Lab Results   Component Value Date    CHOL 110 05/02/2017     Lab Results   Component Value Date    HDL 42 05/02/2017     Lab Results   Component Value Date    LDL 44 05/02/2017     Lab Results   Component Value Date    TRIG 120 05/02/2017     Lab Results   Component Value Date    CHOLHDLRATIO 3.6 04/22/2015     Lab Results   Component Value Date    AST 23 11/05/2015     Lab Results   Component Value Date    ALT 31 11/05/2015     Lab Results   Component Value Date    A1C  05/02/2017     DEL  Test canceled by physician  CORRECTED ON 05/03 AT 0811: PREVIOUSLY REPORTED AS 7.4      A1C 7.2 04/24/2017    A1C 7.0 05/09/2016    A1C 6.9 11/05/2015    A1C 7.2 04/22/2015     Potassium   Date Value Ref Range Status   04/24/2017 4.1 3.4 - 5.3 mmol/L Final     metFORMIN (GLUCOPHAGE) 500 MG tablet         Last Written Prescription Date:  7/26/17  Last Fill Quantity: 120, # refills: 0  Last Office Visit with [facility], [facility] or [facility] prescribing provider:  5/19/17 BIN        BP Readings from Last 3 Encounters:   05/19/17 132/80   04/24/17 132/84   12/21/16 157/69     Lab Results   Component Value Date    MICROL 89 04/24/2017     Lab Results   Component Value Date    UMALCR 70.95 04/24/2017     Creatinine   Date Value Ref Range Status   04/24/2017 0.94 0.66 - 1.25 mg/dL Final   ]  GFR Estimate   Date Value Ref Range Status   04/24/2017 80 >60 mL/min/1.7m2 Final     Comment:     Non  GFR Calc   11/05/2015 76 >60 mL/min/1.7m2 Final     Comment:     Non  GFR Calc   09/16/2014 78 >60 mL/min/1.7m2 Final     Comment:     Non  GFR Calc     GFR Estimate If Black   Date Value Ref Range Status   04/24/2017 >90   GFR Calc   >60 mL/min/1.7m2 Final   11/05/2015 >90   GFR Calc   >60 mL/min/1.7m2 Final   09/16/2014 >90   GFR Calc   >60 mL/min/1.7m2 Final     Lab Results   Component Value Date    CHOL 110 05/02/2017     Lab Results   Component Value Date    HDL 42 05/02/2017     Lab Results   Component Value Date    LDL 44 05/02/2017     Lab Results   Component Value Date    TRIG 120 05/02/2017     Lab Results   Component Value Date    CHOLHDLRATIO 3.6 04/22/2015     Lab Results   Component Value Date    AST 23 11/05/2015     Lab Results   Component Value Date    ALT 31 11/05/2015     Lab Results   Component Value Date    A1C  05/02/2017     DEL  Test canceled by physician  CORRECTED ON 05/03 AT 0811: PREVIOUSLY REPORTED AS 7.4      A1C 7.2 04/24/2017    A1C 7.0 05/09/2016    A1C 6.9 11/05/2015    A1C 7.2 04/22/2015     Potassium   Date Value Ref Range Status   04/24/2017 4.1 3.4 - 5.3 mmol/L Final     pioglitazone (ACTOS) 45 MG tablet         Last Written Prescription Date: 7/26/17  Last Fill Quantity: 30, # refills: 0  Last Office Visit with [facility], [facility] or  [facility]  prescribing provider:  5/19/17 BIN        BP Readings from Last 3 Encounters:   05/19/17 132/80   04/24/17 132/84   12/21/16 157/69     Lab Results   Component Value Date    MICROL 89 04/24/2017     Lab Results   Component Value Date    UMALCR 70.95 04/24/2017     Creatinine   Date Value Ref Range Status   04/24/2017 0.94 0.66 - 1.25 mg/dL Final   ]  GFR Estimate   Date Value Ref Range Status   04/24/2017 80 >60 mL/min/1.7m2 Final     Comment:     Non  GFR Calc   11/05/2015 76 >60 mL/min/1.7m2 Final     Comment:     Non  GFR Calc   09/16/2014 78 >60 mL/min/1.7m2 Final     Comment:     Non  GFR Calc     GFR Estimate If Black   Date Value Ref Range Status   04/24/2017 >90   GFR Calc   >60 mL/min/1.7m2 Final   11/05/2015 >90   GFR Calc   >60 mL/min/1.7m2 Final   09/16/2014 >90   GFR Calc   >60 mL/min/1.7m2 Final     Lab Results   Component Value Date    CHOL 110 05/02/2017     Lab Results   Component Value Date    HDL 42 05/02/2017     Lab Results   Component Value Date    LDL 44 05/02/2017     Lab Results   Component Value Date    TRIG 120 05/02/2017     Lab Results   Component Value Date    CHOLHDLRATIO 3.6 04/22/2015     Lab Results   Component Value Date    AST 23 11/05/2015     Lab Results   Component Value Date    ALT 31 11/05/2015     Lab Results   Component Value Date    A1C  05/02/2017     DEL  Test canceled by physician  CORRECTED ON 05/03 AT 0811: PREVIOUSLY REPORTED AS 7.4      A1C 7.2 04/24/2017    A1C 7.0 05/09/2016    A1C 6.9 11/05/2015    A1C 7.2 04/22/2015     Potassium   Date Value Ref Range Status   04/24/2017 4.1 3.4 - 5.3 mmol/L Final

## 2017-09-01 RX ORDER — PIOGLITAZONEHYDROCHLORIDE 45 MG/1
TABLET ORAL
Qty: 30 TABLET | Refills: 0 | Status: SHIPPED | OUTPATIENT
Start: 2017-09-01 | End: 2017-10-18

## 2017-09-01 RX ORDER — GLYBURIDE 5 MG/1
TABLET ORAL
Qty: 120 TABLET | Refills: 0 | Status: SHIPPED | OUTPATIENT
Start: 2017-09-01 | End: 2017-10-18

## 2017-09-01 NOTE — TELEPHONE ENCOUNTER
"GLYBURIDE  Routing refill request to provider for review/approval because:  Labs out of range:  microalbumin  DUE FOR FOLLOW UP, per telephone encounter from 7/22/2017 AE says, \"Uncontrolled at last visit.  OFfered dm ed and f/u in 3 months.  It is 3 months out now.  Please call for f/u.  Mayelin Hernandez MD.\"  Spoke to patient and attempted to help patient schedule appointment. Patient states that it's too soon for a follow up visit. Patient states he wants to follow up, but does not want to do it until early fall and wondering if he can still get refills until early fall.  Will route to provider pool in AE's absence for review. Please advise.  Pauline Monroy      METFORMIN  Routing refill request to provider for review/approval because:  Labs out of range:  microalbumin  DUE FOR FOLLOW UP    ACTOS  Routing refill request to provider for review/approval because:  Labs out of range:  microalbumin  DUE FOR FOLLOW UP    Nina Briseno RN, BSN    "

## 2017-09-04 DIAGNOSIS — E11.59 TYPE 2 DIABETES MELLITUS WITH OTHER CIRCULATORY COMPLICATIONS (H): ICD-10-CM

## 2017-09-07 RX ORDER — PIOGLITAZONEHYDROCHLORIDE 45 MG/1
TABLET ORAL
Qty: 90 TABLET | Refills: 0 | OUTPATIENT
Start: 2017-09-07

## 2017-09-07 NOTE — TELEPHONE ENCOUNTER
Actos  Last rx written on 9/1/2017 for a 30 day supply.  This was a [facility] refill.      Metformin  Last rx written on 9/1/2017 for a 30 day supply.  This is was a [facility]be refill.    Jia Gonzalez RN

## 2017-10-06 ENCOUNTER — MYC MEDICAL ADVICE (OUTPATIENT)
Dept: FAMILY MEDICINE | Facility: OTHER | Age: 67
End: 2017-10-06

## 2017-10-09 NOTE — TELEPHONE ENCOUNTER
metFORMIN (GLUCOPHAGE) 500 MG tablet &  glyBURIDE (DIABETA /MICRONASE) 5 MG tablet        Last Written Prescription Date: 09/01/17  Last Fill Quantity: 120, # refills: 0  Last Office Visit with G, P or [facility] prescribing provider:  05/19/17   Next 5 appointments (look out 90 days)     Oct 18, 2017 10:00 AM CDT   Office Visit with Mayelin Hernandez MD   [facility]    [address]  [place] [address]   [phone]                   BP Readings from Last 3 Encounters:   05/19/17 132/80   04/24/17 132/84   12/21/16 157/69     Lab Results   Component Value Date    MICROL 89 04/24/2017     Lab Results   Component Value Date    UMALCR 70.95 04/24/2017     Creatinine   Date Value Ref Range Status   04/24/2017 0.94 0.66 - 1.25 mg/dL Final   ]  GFR Estimate   Date Value Ref Range Status   04/24/2017 80 >60 mL/min/1.7m2 Final     Comment:     Non  GFR Calc   11/05/2015 76 >60 mL/min/1.7m2 Final     Comment:     Non  GFR Calc   09/16/2014 78 >60 mL/min/1.7m2 Final     Comment:     Non  GFR Calc     GFR Estimate If Black   Date Value Ref Range Status   04/24/2017 >90   GFR Calc   >60 mL/min/1.7m2 Final   11/05/2015 >90   GFR Calc   >60 mL/min/1.7m2 Final   09/16/2014 >90   GFR Calc   >60 mL/min/1.7m2 Final     Lab Results   Component Value Date    CHOL 110 05/02/2017     Lab Results   Component Value Date    HDL 42 05/02/2017     Lab Results   Component Value Date    LDL 44 05/02/2017     Lab Results   Component Value Date    TRIG 120 05/02/2017     Lab Results   Component Value Date    CHOLHDLRATIO 3.6 04/22/2015     Lab Results   Component Value Date    AST 23 11/05/2015     Lab Results   Component Value Date    ALT 31 11/05/2015     Lab Results   Component Value Date    A1C  05/02/2017     DEL  Test canceled by physician  CORRECTED ON 05/03 AT 0811: PREVIOUSLY  REPORTED AS 7.4      A1C 7.2 04/24/2017    A1C 7.0 05/09/2016    A1C 6.9 11/05/2015    A1C 7.2 04/22/2015     Potassium   Date Value Ref Range Status   04/24/2017 4.1 3.4 - 5.3 mmol/L Final

## 2017-10-09 NOTE — TELEPHONE ENCOUNTER
pioglitazone (ACTOS) 45 MG tablet         Last Written Prescription Date: 09/01/17  Last Fill Quantity: 30, # refills: 0  Last Office Visit with [facility] or  [facility] prescribing provider:  05/19/17   Next 5 appointments (look out 90 days)     Oct 18, 2017 10:00 AM CDT   Office Visit with Mayelin Hernandez MD   [facility]    [address]  [place] [address]   [phone]                   BP Readings from Last 3 Encounters:   05/19/17 132/80   04/24/17 132/84   12/21/16 157/69     Lab Results   Component Value Date    MICROL 89 04/24/2017     Lab Results   Component Value Date    UMALCR 70.95 04/24/2017     Creatinine   Date Value Ref Range Status   04/24/2017 0.94 0.66 - 1.25 mg/dL Final   ]  GFR Estimate   Date Value Ref Range Status   04/24/2017 80 >60 mL/min/1.7m2 Final     Comment:     Non  GFR Calc   11/05/2015 76 >60 mL/min/1.7m2 Final     Comment:     Non  GFR Calc   09/16/2014 78 >60 mL/min/1.7m2 Final     Comment:     Non  GFR Calc     GFR Estimate If Black   Date Value Ref Range Status   04/24/2017 >90   GFR Calc   >60 mL/min/1.7m2 Final   11/05/2015 >90   GFR Calc   >60 mL/min/1.7m2 Final   09/16/2014 >90   GFR Calc   >60 mL/min/1.7m2 Final     Lab Results   Component Value Date    CHOL 110 05/02/2017     Lab Results   Component Value Date    HDL 42 05/02/2017     Lab Results   Component Value Date    LDL 44 05/02/2017     Lab Results   Component Value Date    TRIG 120 05/02/2017     Lab Results   Component Value Date    CHOLHDLRATIO 3.6 04/22/2015     Lab Results   Component Value Date    AST 23 11/05/2015     Lab Results   Component Value Date    ALT 31 11/05/2015     Lab Results   Component Value Date    A1C  05/02/2017     DEL  Test canceled by physician  CORRECTED ON 05/03 AT 0811: PREVIOUSLY REPORTED AS 7.4      A1C 7.2 04/24/2017    A1C 7.0  05/09/2016    A1C 6.9 11/05/2015    A1C 7.2 04/22/2015     Potassium   Date Value Ref Range Status   04/24/2017 4.1 3.4 - 5.3 mmol/L Final

## 2017-10-10 RX ORDER — PIOGLITAZONEHYDROCHLORIDE 45 MG/1
TABLET ORAL
Qty: 30 TABLET | Refills: 0 | OUTPATIENT
Start: 2017-10-10

## 2017-10-10 RX ORDER — GLYBURIDE 5 MG/1
TABLET ORAL
Qty: 120 TABLET | Refills: 0 | OUTPATIENT
Start: 2017-10-10

## 2017-10-10 NOTE — TELEPHONE ENCOUNTER
pioglitazone (ACTOS) 45 MG tablet  Routing refill request to provider for review/approval because:  Nikki given x1 and patient did not follow up, please advise  Labs out of range:  microlbumin  Patient needs to be seen because:  Due for OV (scheduled)    metFORMIN (GLUCOPHAGE) 500 MG tablet  Routing refill request to provider for review/approval because:  Nikki given x1 and patient did not follow up, please advise  Labs out of range:  microlbumin  Patient needs to be seen because:  Due for OV (scheduled)    glyBURIDE (DIABETA /MICRONASE) 5 MG tablet  Routing refill request to provider for review/approval because:  Nikki given x1 and patient did not follow up, please advise  Labs out of range:  microlbumin  Patient needs to be seen because:  Due for OV (scheduled)    Next 5 appointments (look out 90 days)     Oct 18, 2017 10:00 AM CDT   Office Visit with Mayelin Hernandez MD   [facility] ([facility])    [address]  [facility] [facility] [address]   [phone]                Fide Nino RN, BSN

## 2017-10-12 ENCOUNTER — MYC MEDICAL ADVICE (OUTPATIENT)
Dept: FAMILY MEDICINE | Facility: OTHER | Age: 67
End: 2017-10-12

## 2017-10-12 ENCOUNTER — TELEPHONE (OUTPATIENT)
Dept: FAMILY MEDICINE | Facility: OTHER | Age: 67
End: 2017-10-12

## 2017-10-12 DIAGNOSIS — E11.9 TYPE 2 DIABETES MELLITUS WITHOUT COMPLICATION, WITHOUT LONG-TERM CURRENT USE OF INSULIN (H): Primary | ICD-10-CM

## 2017-10-13 RX ORDER — GLYBURIDE 5 MG/1
TABLET ORAL
Qty: 120 TABLET | Refills: 0 | Status: SHIPPED | OUTPATIENT
Start: 2017-10-13 | End: 2017-10-18

## 2017-10-13 RX ORDER — PIOGLITAZONEHYDROCHLORIDE 45 MG/1
TABLET ORAL
Qty: 30 TABLET | Refills: 0 | Status: SHIPPED | OUTPATIENT
Start: 2017-10-13 | End: 2017-10-18

## 2017-10-13 NOTE — TELEPHONE ENCOUNTER
Patient is calling to check on the status of this refill. Patient states the pharmacy told him the medications were denied because he needs an appointment. Patient has an appointment set up for 10/18/2017 but he will be out of medication before the appointment.    Thank you Monica

## 2017-10-13 NOTE — TELEPHONE ENCOUNTER
Next 5 appointments (look out 90 days)     Oct 18, 2017 10:00 AM CDT   Office Visit with Mayelin Hernandez MD   [facility] ([facility])    [address] [address]   [phone]                Dr. Hernandez denied these on 10/6/17, however, patient doesn't have enough to make it through to appointment.     Alexus Simmons, RN, BSN
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
## 2017-10-13 NOTE — TELEPHONE ENCOUNTER
Pending Prescriptions:                       Disp   Refills    metFORMIN (GLUCOPHAGE) 500 MG tablet [Pha*120 ta*0      Last Written Prescription Date: 9/1/2017      Sig: TAKE 2 TABLETS BY MOUTH 2 TIMES DAILY (WITH           MEALS). DUE FOR FOLLOW UP    glyBURIDE (DIABETA /MICRONASE) 5 MG table*120 ta*0      Last Written Prescription Date: 9/1/2017      Sig: TAKE 2 TABLETS BY MOUTH 2 TIMES DAILY (WITH           MEALS). DUE FOR FOLLOW UP    pioglitazone (ACTOS) 45 MG tablet [Pharma*30 tab*0      Last Written Prescription Date: 9/1/2017      Sig: TAKE 1 TABLET BY MOUTH ONCE DAILY. DUE FOR FOLLOW           UP    Last Office Visit with [facility], [facility] or [facility] prescribing provider:  5/19/2017   Next 5 appointments (look out 90 days)     Oct 18, 2017 10:00 AM CDT   Office Visit with Mayelin Hernandez MD   [facility] ([facility])    [address]  [place] [address]   [phone]                   BP Readings from Last 3 Encounters:   05/19/17 132/80   04/24/17 132/84   12/21/16 157/69     Lab Results   Component Value Date    MICROL 89 04/24/2017     Lab Results   Component Value Date    UMALCR 70.95 04/24/2017     Creatinine   Date Value Ref Range Status   04/24/2017 0.94 0.66 - 1.25 mg/dL Final   ]  GFR Estimate   Date Value Ref Range Status   04/24/2017 80 >60 mL/min/1.7m2 Final     Comment:     Non  GFR Calc   11/05/2015 76 >60 mL/min/1.7m2 Final     Comment:     Non  GFR Calc   09/16/2014 78 >60 mL/min/1.7m2 Final     Comment:     Non  GFR Calc     GFR Estimate If Black   Date Value Ref Range Status   04/24/2017 >90   GFR Calc   >60 mL/min/1.7m2 Final   11/05/2015 >90   GFR Calc   >60 mL/min/1.7m2 Final   09/16/2014 >90   GFR Calc   >60 mL/min/1.7m2 Final     Lab Results   Component Value Date    CHOL 110 05/02/2017     Lab Results   Component Value Date    HDL 42 05/02/2017      Lab Results   Component Value Date    LDL 44 05/02/2017     Lab Results   Component Value Date    TRIG 120 05/02/2017     Lab Results   Component Value Date    CHOLHDLRATIO 3.6 04/22/2015     Lab Results   Component Value Date    AST 23 11/05/2015     Lab Results   Component Value Date    ALT 31 11/05/2015     Lab Results   Component Value Date    A1C  05/02/2017     DEL  Test canceled by physician  CORRECTED ON 05/03 AT 0811: PREVIOUSLY REPORTED AS 7.4      A1C 7.2 04/24/2017    A1C 7.0 05/09/2016    A1C 6.9 11/05/2015    A1C 7.2 04/22/2015     Potassium   Date Value Ref Range Status   04/24/2017 4.1 3.4 - 5.3 mmol/L Final

## 2017-10-18 ENCOUNTER — OFFICE VISIT (OUTPATIENT)
Dept: FAMILY MEDICINE | Facility: OTHER | Age: 67
End: 2017-10-18
Payer: COMMERCIAL

## 2017-10-18 VITALS
WEIGHT: 259 LBS | BODY MASS INDEX: 36.26 KG/M2 | HEIGHT: 71 IN | SYSTOLIC BLOOD PRESSURE: 124 MMHG | TEMPERATURE: 97.3 F | RESPIRATION RATE: 14 BRPM | HEART RATE: 72 BPM | DIASTOLIC BLOOD PRESSURE: 64 MMHG

## 2017-10-18 DIAGNOSIS — Z23 NEED FOR PROPHYLACTIC VACCINATION AND INOCULATION AGAINST INFLUENZA: ICD-10-CM

## 2017-10-18 DIAGNOSIS — I25.10 CORONARY ARTERY DISEASE INVOLVING NATIVE HEART WITHOUT ANGINA PECTORIS, UNSPECIFIED VESSEL OR LESION TYPE: ICD-10-CM

## 2017-10-18 DIAGNOSIS — E11.59 TYPE 2 DIABETES MELLITUS WITH OTHER CIRCULATORY COMPLICATION, WITHOUT LONG-TERM CURRENT USE OF INSULIN (H): Primary | ICD-10-CM

## 2017-10-18 DIAGNOSIS — Z23 NEED FOR PROPHYLACTIC VACCINATION AGAINST STREPTOCOCCUS PNEUMONIAE (PNEUMOCOCCUS): ICD-10-CM

## 2017-10-18 DIAGNOSIS — Z91.81 AT RISK FOR FALLING: ICD-10-CM

## 2017-10-18 DIAGNOSIS — B07.8 OTHER VIRAL WARTS: ICD-10-CM

## 2017-10-18 DIAGNOSIS — E11.9 TYPE 2 DIABETES MELLITUS WITHOUT COMPLICATION, WITHOUT LONG-TERM CURRENT USE OF INSULIN (H): ICD-10-CM

## 2017-10-18 LAB
ALBUMIN SERPL-MCNC: 3.8 G/DL (ref 3.4–5)
ALP SERPL-CCNC: 67 U/L (ref 40–150)
ALT SERPL W P-5'-P-CCNC: 29 U/L (ref 0–70)
ANION GAP SERPL CALCULATED.3IONS-SCNC: 8 MMOL/L (ref 3–14)
AST SERPL W P-5'-P-CCNC: 22 U/L (ref 0–45)
BILIRUB SERPL-MCNC: 0.8 MG/DL (ref 0.2–1.3)
BUN SERPL-MCNC: 18 MG/DL (ref 7–30)
CALCIUM SERPL-MCNC: 9.1 MG/DL (ref 8.5–10.1)
CHLORIDE SERPL-SCNC: 106 MMOL/L (ref 94–109)
CO2 SERPL-SCNC: 24 MMOL/L (ref 20–32)
CREAT SERPL-MCNC: 0.98 MG/DL (ref 0.66–1.25)
GFR SERPL CREATININE-BSD FRML MDRD: 75 ML/MIN/1.7M2
GLUCOSE SERPL-MCNC: 163 MG/DL (ref 70–99)
HBA1C MFR BLD: 7.2 % (ref 4.3–6)
POTASSIUM SERPL-SCNC: 4.6 MMOL/L (ref 3.4–5.3)
PROT SERPL-MCNC: 7.3 G/DL (ref 6.8–8.8)
SODIUM SERPL-SCNC: 138 MMOL/L (ref 133–144)
TSH SERPL DL<=0.005 MIU/L-ACNC: 1.69 MU/L (ref 0.4–4)

## 2017-10-18 PROCEDURE — 80053 COMPREHEN METABOLIC PANEL: CPT | Performed by: FAMILY MEDICINE

## 2017-10-18 PROCEDURE — 36415 COLL VENOUS BLD VENIPUNCTURE: CPT | Performed by: FAMILY MEDICINE

## 2017-10-18 PROCEDURE — 84443 ASSAY THYROID STIM HORMONE: CPT | Performed by: FAMILY MEDICINE

## 2017-10-18 PROCEDURE — 83036 HEMOGLOBIN GLYCOSYLATED A1C: CPT | Performed by: FAMILY MEDICINE

## 2017-10-18 PROCEDURE — 99214 OFFICE O/P EST MOD 30 MIN: CPT | Mod: 25 | Performed by: FAMILY MEDICINE

## 2017-10-18 PROCEDURE — 17110 DESTRUCTION B9 LES UP TO 14: CPT | Performed by: FAMILY MEDICINE

## 2017-10-18 RX ORDER — GLYBURIDE 5 MG/1
TABLET ORAL
Qty: 360 TABLET | Refills: 0 | Status: SHIPPED | OUTPATIENT
Start: 2017-10-18 | End: 2018-02-18

## 2017-10-18 RX ORDER — PIOGLITAZONEHYDROCHLORIDE 45 MG/1
TABLET ORAL
Qty: 90 TABLET | Refills: 0 | Status: SHIPPED | OUTPATIENT
Start: 2017-10-18 | End: 2018-02-18

## 2017-10-18 NOTE — PATIENT INSTRUCTIONS
Call if interested in referral to Diabetic Education or Nutritionist, as discussed     Continue with decreasing alcohol intake and work on healthy food choices     Follow up for eye exam, as discussed    Follow up for repeat liquid nitrogen treatment, if wart does not clear up    Follow up in 3 months for recheck and office visit    Lab Results   Component Value Date    A1C 7.2 10/18/2017    A1C 7.2 04/24/2017    A1C 7.0 05/09/2016    A1C 6.9 11/05/2015    A1C 7.2 04/22/2015    Hemoglobin A1c   Average Blood Sugar    6%    135 mg/dL  7%    170 mg/dL  8%    205 mg/dL   9%    240 mg/dL   10%    275 mg/dL

## 2017-10-18 NOTE — PROGRESS NOTES
Genaro, your results look good other than the high blood sugar.  Please let me know if you have any questions.    Mayelin Hernandez MD

## 2017-10-18 NOTE — MR AVS SNAPSHOT
After Visit Summary   10/18/2017    Genaro Barbosa    MRN: [id number]           Patient Information     Date Of Birth          1950        Visit Information        Provider Department      10/18/2017 10:00 AM Mayelin Hernandez MD [facility]        Today's Diagnoses     Type 2 diabetes mellitus with other circulatory complication, without long-term current use of insulin (H)    -  1    At risk for falling        Need for prophylactic vaccination and inoculation against influenza        Need for prophylactic vaccination against Streptococcus pneumoniae (pneumococcus)        Coronary artery disease involving native heart without angina pectoris, unspecified vessel or lesion type        Type 2 diabetes mellitus without complication, without long-term current use of insulin (H)          Care Instructions    Call if interested in referral to Diabetic Education or Nutritionist, as discussed     Continue with decreasing alcohol intake and work on healthy food choices     Follow up for eye exam, as discussed    Follow up for repeat liquid nitrogen treatment, if wart does not clear up    Follow up in 3 months for recheck and office visit    Lab Results   Component Value Date    A1C 7.2 10/18/2017    A1C 7.2 04/24/2017    A1C 7.0 05/09/2016    A1C 6.9 11/05/2015    A1C 7.2 04/22/2015    Hemoglobin A1c   Average Blood Sugar    6%    135 mg/dL  7%    170 mg/dL  8%    205 mg/dL   9%    240 mg/dL   10%    275 mg/dL             Follow-ups after your visit        Follow-up notes from your care team     Return in about 3 months (around 1/18/2018).      Future tests that were ordered for you today     Open Future Orders        Priority Expected Expires Ordered    Hemoglobin A1c Routine  1/16/2018 10/18/2017            Who to contact     If you have questions or need follow up information about today's clinic visit or your schedule please contact [facility] directly at  "[phone].  Normal or non-critical lab and imaging results will be communicated to you by c[employer]art, letter or phone within 4 business days after the clinic has received the results. If you do not hear from us within 7 days, please contact the clinic through imagoot or phone. If you have a critical or abnormal lab result, we will notify you by phone as soon as possible.  Submit refill requests through [employer] or call your pharmacy and they will forward the refill request to us. Please allow 3 business days for your refill to be completed.          Additional Information About Your Visit        cuaQeahart Information     [employer] gives you secure access to your electronic health record. If you see a primary care provider, you can also send messages to your care team and make appointments. If you have questions, please call your primary care clinic.  If you do not have a primary care provider, please call [phone] and they will assist you.        Care EveryWhere ID     This is your Care EveryWhere ID. This could be used by other organizations to access your [place] medical records  [id number]        Your Vitals Were     Pulse Temperature Respirations Height BMI (Body Mass Index)       72 97.3  F (36.3  C) (Temporal) 14 5' 11\" (1.803 m) 36.12 kg/m2        Blood Pressure from Last 3 Encounters:   10/18/17 124/64   05/19/17 132/80   04/24/17 132/84    Weight from Last 3 Encounters:   10/18/17 259 lb (117.5 kg)   05/19/17 250 lb (113.4 kg)   04/24/17 253 lb (114.8 kg)              We Performed the Following     COMPREHENSIVE METABOLIC PANEL     HEMOGLOBIN A1C     TSH WITH FREE T4 REFLEX          Today's Medication Changes          These changes are accurate as of: 10/18/17 10:35 AM.  If you have any questions, ask your nurse or doctor.               These medicines have changed or have updated prescriptions.        Dose/Directions    glyBURIDE 5 MG tablet   Commonly known as:  DIABETA /MICRONASE   This may have " changed:  See the new instructions.   Used for:  Type 2 diabetes mellitus without complication, without long-term current use of insulin (H)   Changed by:  Mayelin Hernandez MD        TAKE 2 TABLETS BY MOUTH 2 TIMES DAILY (WITH MEALS).   Quantity:  360 tablet   Refills:  0       metFORMIN 500 MG tablet   Commonly known as:  GLUCOPHAGE   This may have changed:  See the new instructions.   Used for:  Type 2 diabetes mellitus without complication, without long-term current use of insulin (H)   Changed by:  Mayelin Hernandez MD        TAKE 2 TABLETS BY MOUTH 2 TIMES DAILY (WITH MEALS). DUE FOR FOLLOW UP   Quantity:  360 tablet   Refills:  0       pioglitazone 45 MG tablet   Commonly known as:  ACTOS   This may have changed:  See the new instructions.   Used for:  Type 2 diabetes mellitus without complication, without long-term current use of insulin (H)   Changed by:  Mayelin Hernandez MD        TAKE 1 TABLET BY MOUTH ONCE DAILY.   Quantity:  90 tablet   Refills:  0            Where to get your medicines      These medications were sent to [facility] [place] [address] [facility] - [place], [place] - [address] [address] [place] [address]  [address] [address] [place] [address], [facility] [address]     Phone:  [phone]     glyBURIDE 5 MG tablet    metFORMIN 500 MG tablet    pioglitazone 45 MG tablet                Primary Care Provider Office Phone # Fax #    Mayelin Hernandez [phone]       [address] [place] [address]        Equal Access to Services     [facility] : Hadii awilda ku hadasho Soomaali, waaxda luqadaha, qaybta kaalmada adeegyada, kristin pablo. So [facility] [phone].    ATENCIÓN: Si habla español, tiene a briceno disposición servicios gratuitos de asistencia lingüística. Llame al [phone].    We comply with applicable federal civil rights laws and [place] laws. We do not discriminate on the basis of race, color, national origin, age, disability, sex, sexual orientation, or gender identity.            Thank you!     Thank  you for choosing [facility]  for your care. Our goal is always to provide you with excellent care. Hearing back from our patients is one way we can continue to improve our services. Please take a few minutes to complete the written survey that you may receive in the mail after your visit with us. Thank you!             Your Updated Medication List - Protect others around you: Learn how to safely use, store and throw away your medicines at www.disposemymeds.org.          This list is accurate as of: 10/18/17 10:35 AM.  Always use your most recent med list.                   Brand Name Dispense Instructions for use Diagnosis    albuterol 108 (90 BASE) MCG/ACT Inhaler    PROAIR HFA    18 g    Inhale 2 puffs into the lungs every 6 hours    Acute bronchospasm       aspirin 325 MG EC tablet     0    1 tab po QD (Once per day)        azithromycin 250 MG tablet    ZITHROMAX    6 tablet    Two tablets first day, then one tablet daily for four days.    Cough, Acute bronchitis, unspecified organism       blood glucose monitoring test strip    no brand specified    3 Box    1 strip by In Vitro route 2 times daily Test as directed    Type 2 diabetes mellitus with other circulatory complications       clopidogrel 75 MG tablet    PLAVIX    90 tablet    TAKE  ONE TABLET BY MOUTH EVERY DAY    CAD (coronary artery disease), Chronic ischemic heart disease, unspecified       CRESTOR 40 MG tablet   Generic drug:  rosuvastatin     90 tablet    Take 1 tablet (40 mg) by mouth daily    CAD (coronary artery disease), Hyperlipidemia LDL goal <70       glyBURIDE 5 MG tablet    DIABETA /MICRONASE    360 tablet    TAKE 2 TABLETS BY MOUTH 2 TIMES DAILY (WITH MEALS).    Type 2 diabetes mellitus without complication, without long-term current use of insulin (H)       ibuprofen 200 MG tablet    ADVIL/MOTRIN     Take 1,000 mg by mouth nightly as needed.        lisinopril 20 MG tablet    PRINIVIL/ZESTRIL    90 tablet    Take 1 tablet  (20 mg) by mouth daily    Type 2 diabetes mellitus with other circulatory complications, HTN, goal below 130/80, Coronary artery disease involving native heart without angina pectoris, unspecified vessel or lesion type       metFORMIN 500 MG tablet    GLUCOPHAGE    360 tablet    TAKE 2 TABLETS BY MOUTH 2 TIMES DAILY (WITH MEALS). DUE FOR FOLLOW UP    Type 2 diabetes mellitus without complication, without long-term current use of insulin (H)       metoprolol 50 MG tablet    LOPRESSOR    180 tablet    Take 1 tablet (50 mg) by mouth 2 times daily    HTN, goal below 140/90       order for DME     1 packet    Glucose test strips Use 1-2 x daily    Type 2 diabetes, HbA1C goal < 8% (H)       pioglitazone 45 MG tablet    ACTOS    90 tablet    TAKE 1 TABLET BY MOUTH ONCE DAILY.    Type 2 diabetes mellitus without complication, without long-term current use of insulin (H)       predniSONE 20 MG tablet    DELTASONE    5 tablet    Take 1 tablet (20 mg) by mouth daily    Acute bronchitis, unspecified organism, Acute bronchospasm       Pycnogenol 50 MG Caps     120 Cap    taking OPC3 2 caps per day    Chronic ischemic heart disease, unspecified

## 2017-10-18 NOTE — NURSING NOTE
"Chief Complaint   Patient presents with     Recheck Medication     Panel Management     pneumo, flu, fall risk, honoring choices, MCP, A1C, eye exam, TSH, BMP       Initial There were no vitals taken for this visit. Estimated body mass index is 34.87 kg/(m^2) as calculated from the following:    Height as of 4/24/17: 5' 11\" (1.803 m).    Weight as of 5/19/17: 250 lb (113.4 kg).  Medication Reconciliation: complete  "

## 2018-02-02 ENCOUNTER — TELEPHONE (OUTPATIENT)
Dept: FAMILY MEDICINE | Facility: OTHER | Age: 68
End: 2018-02-02

## 2018-02-02 NOTE — TELEPHONE ENCOUNTER
Panel Management Review        Summary:    Patient is due/failing the following:   A1C and FOLLOW UP    Action needed:   Patient needs office visit for DM.    Type of outreach:    Phone, left message for patient to call back.     Questions for provider review:    None                                                                                                                                    Vickie Tay CMA        Chart routed to Care Team .    Patient has the following on his problem list:     Diabetes    ASA: Passed    Last A1C  Lab Results   Component Value Date    A1C 7.2 10/18/2017    A1C  05/02/2017     DEL  Test canceled by physician  CORRECTED ON 05/03 AT 0811: PREVIOUSLY REPORTED AS 7.4      A1C 7.2 04/24/2017    A1C 7.0 05/09/2016    A1C 6.9 11/05/2015     A1C tested: Passed    Last LDL:    Lab Results   Component Value Date    CHOL 110 05/02/2017     Lab Results   Component Value Date    HDL 42 05/02/2017     Lab Results   Component Value Date    LDL 44 05/02/2017     Lab Results   Component Value Date    TRIG 120 05/02/2017     Lab Results   Component Value Date    CHOLHDLRATIO 3.6 04/22/2015     Lab Results   Component Value Date    NHDL 68 05/02/2017       Is the patient on a Statin? YES             Is the patient on Aspirin? YES    Medications     HMG CoA Reductase Inhibitors    rosuvastatin (CRESTOR) 40 MG tablet    Salicylates    ASPIRIN 325 MG OR TBEC          Last three blood pressure readings:  BP Readings from Last 3 Encounters:   10/18/17 124/64   05/19/17 132/80   04/24/17 132/84       Date of last diabetes office visit: 10/18/17     Tobacco History:     History   Smoking Status     Former Smoker   Smokeless Tobacco     Never Used     Comment: quit age 26           IVD   ASA: Passed    Last LDL:    Lab Results   Component Value Date    CHOL 110 05/02/2017     Lab Results   Component Value Date    HDL 42 05/02/2017     Lab Results   Component Value Date    LDL 44 05/02/2017     Lab  Results   Component Value Date    TRIG 120 05/02/2017        Lab Results   Component Value Date    CHOLHDLRATIO 3.6 04/22/2015        Is the patient on a Statin? YES   Is the patient on Aspirin? YES                  Medications     HMG CoA Reductase Inhibitors    rosuvastatin (CRESTOR) 40 MG tablet    Salicylates    ASPIRIN 325 MG OR TBEC          Last three blood pressure readings:  BP Readings from Last 3 Encounters:   10/18/17 124/64   05/19/17 132/80   04/24/17 132/84        Tobacco History:     History   Smoking Status     Former Smoker   Smokeless Tobacco     Never Used     Comment: quit age 26       Hypertension   Last three blood pressure readings:  BP Readings from Last 3 Encounters:   10/18/17 124/64   05/19/17 132/80   04/24/17 132/84     Blood pressure: Passed    HTN Guidelines:  Age 18-59 BP range:  Less than 140/90  Age 60-85 with Diabetes:  Less than 140/90  Age 60-85 without Diabetes:  less than 150/90      Composite cancer screening  Chart review shows that this patient is due/due soon for the following None

## 2018-02-08 ENCOUNTER — MYC MEDICAL ADVICE (OUTPATIENT)
Dept: FAMILY MEDICINE | Facility: OTHER | Age: 68
End: 2018-02-08

## 2018-02-23 ENCOUNTER — MYC MEDICAL ADVICE (OUTPATIENT)
Dept: FAMILY MEDICINE | Facility: OTHER | Age: 68
End: 2018-02-23

## 2018-03-07 ENCOUNTER — MYC MEDICAL ADVICE (OUTPATIENT)
Dept: FAMILY MEDICINE | Facility: OTHER | Age: 68
End: 2018-03-07

## 2018-03-07 NOTE — TELEPHONE ENCOUNTER
It is not likely a good idea as I also have an induction that day and may need to move around patients.  Can we ask him to consider another day  Mayelin Hernandez MD

## 2018-03-07 NOTE — TELEPHONE ENCOUNTER
You only have 15 min here and there on Monday 3/12  Is there a time you could see him for a arnold diabetes.? Or just look for a different day where you would have 30 min.

## 2018-03-09 NOTE — TELEPHONE ENCOUNTER
3rd attempt to reach patient - [employer] message sent. Will await response.     Brie Chavez, CMA

## 2018-03-12 NOTE — TELEPHONE ENCOUNTER
Left message for pt to call back to clinic..  Multiply attempts made will close encounter.  Vickie Tay, CMA

## 2018-03-13 ENCOUNTER — MYC MEDICAL ADVICE (OUTPATIENT)
Dept: FAMILY MEDICINE | Facility: OTHER | Age: 68
End: 2018-03-13

## 2018-03-15 ENCOUNTER — OFFICE VISIT (OUTPATIENT)
Dept: ORTHOPEDICS | Facility: CLINIC | Age: 68
End: 2018-03-15
Payer: COMMERCIAL

## 2018-03-15 VITALS — TEMPERATURE: 98 F | RESPIRATION RATE: 18 BRPM | WEIGHT: 255 LBS | BODY MASS INDEX: 35.7 KG/M2 | HEIGHT: 71 IN

## 2018-03-15 DIAGNOSIS — M75.102 LEFT ROTATOR CUFF TEAR ARTHROPATHY: Primary | ICD-10-CM

## 2018-03-15 DIAGNOSIS — M12.812 LEFT ROTATOR CUFF TEAR ARTHROPATHY: Primary | ICD-10-CM

## 2018-03-15 PROCEDURE — 20610 DRAIN/INJ JOINT/BURSA W/O US: CPT | Mod: LT | Performed by: ORTHOPAEDIC SURGERY

## 2018-03-15 RX ORDER — METHYLPREDNISOLONE ACETATE 80 MG/ML
80 INJECTION, SUSPENSION INTRA-ARTICULAR; INTRALESIONAL; INTRAMUSCULAR; SOFT TISSUE ONCE
Qty: 1 ML | Refills: 0 | OUTPATIENT
Start: 2018-03-15 | End: 2018-03-15

## 2018-03-15 NOTE — PROGRESS NOTES
The patient's left shoulder was prepped with betadine solution after verification of allergies. Area approximately 10 cm x 10 cm prepped in a sterile fashion. After injection, betadine removed with soap and water and band-aids applied.    1ml depo-medrol with 1% lidocaine plain injected into patient's left shoulder by Dr. Mac Smalls  LOT# [id number]  Exp. 12/2018    Sloan Rao PA-C  Supervising physician: Mac Smalls MD  Dept. of Orthopedics  [facility]

## 2018-03-15 NOTE — PATIENT INSTRUCTIONS
You have had a steroid injection today.  For the first 2 hours there will likely be some numbing in the joint from the lidocaine.  This is a good sign, indicating that the injection is in the right place.  In 2 hours the lidocaine will wear off, and the joint will hurt like you had a shot.  Each day the cortisone makes it feel better.  It reaches peak effect in 2 weeks.  We expect it to last for 3 months.  You may resume regular activity when you feel ready.  If you are diabetic, your glucoses will be quite high for several days.    We can continue the injections periodically as long as the motion and use of arm is good.

## 2018-03-15 NOTE — LETTER
3/15/2018         RE: Genaro Barbosa  [address] [address]        Dear Colleague,    Thank you for referring your patient, Genaro Barbosa, to the [facility]. Please see a copy of my visit note below.    The patient's left shoulder was prepped with betadine solution after verification of allergies. Area approximately 10 cm x 10 cm prepped in a sterile fashion. After injection, betadine removed with soap and water and band-aids applied.    1ml depo-medrol with 1% lidocaine plain injected into patient's left shoulder by Dr. Mac Smalls  LOT# [id number]  Exp. 12/2018    Sloan Rao PA-C  Supervising physician: Mac Smalls MD  Dept. of Orthopedics  [facility]          Follow up left shoulder rotator cuff tear with early cuff tear arthropathy.  Steroid injection worked well 10/27/16.  He has good range of motion and use of left shoulder, but increased pain now.  Patient desires injection today of left shoulder.  He still has nearly full range of motion.  Risks, benefits, potential complications and alternatives were discussed.   With the patient's consent, sterile prep was performed of left shoulder.  Left shoulder was injected with Depomedrol 80 mg and lidocaine at shoulder subacromial space from the posterolateral approach .  Return to clinic as needed.      Again, thank you for allowing me to participate in the care of your patient.        Sincerely,        Mac Smalls MD

## 2018-03-15 NOTE — MR AVS SNAPSHOT
After Visit Summary   3/15/2018    Genaro Barbosa    MRN: [id number]           Patient Information     Date Of Birth          1950        Visit Information        Provider Department      3/15/2018 10:00 AM Mac Smalls MD [facility]        Today's Diagnoses     Left rotator cuff tear arthropathy    -  1      Care Instructions    You have had a steroid injection today.  For the first 2 hours there will likely be some numbing in the joint from the lidocaine.  This is a good sign, indicating that the injection is in the right place.  In 2 hours the lidocaine will wear off, and the joint will hurt like you had a shot.  Each day the cortisone makes it feel better.  It reaches peak effect in 2 weeks.  We expect it to last for 3 months.  You may resume regular activity when you feel ready.  If you are diabetic, your glucoses will be quite high for several days.    We can continue the injections periodically as long as the motion and use of arm is good.          Follow-ups after your visit        Your next 10 appointments already scheduled     Apr 11, 2018  9:30 AM CDT   [facility]    [address] [place] [phone]           Bring a current list of meds and any records pertaining to this visit. For Physicals, please bring immunization records and any forms needing to be filled out. Please arrive 10 minutes early to complete paperwork.              Who to contact     If you have questions or need follow up information about today's clinic visit or your schedule please contact [facility] directly at [phone].  Normal or non-critical lab and imaging results will be communicated to you by MyChart, letter or phone within 4 business days after the clinic has received the results. If you do not hear from us within 7 days, please contact the clinic through  "MyChart or phone. If you have a critical or abnormal lab result, we will notify you by phone as soon as possible.  Submit refill requests through "[employer]" or call your pharmacy and they will forward the refill request to us. Please allow 3 business days for your refill to be completed.          Additional Information About Your Visit        [employer] Information     "[employer] gives you secure access to your electronic health record. If you see a primary care provider, you can also send messages to your care team and make appointments. If you have questions, please call your primary care clinic.  If you do not have a primary care provider, please call [phone] and they will assist you.        Care EveryWhere ID     This is your Care EveryWhere ID. This could be used by other organizations to access your [place] medical records  [id number]        Your Vitals Were     Temperature Respirations Height BMI (Body Mass Index)          98  F (36.7  C) 18 1.803 m (5' 11\") 35.57 kg/m2         Blood Pressure from Last 3 Encounters:   10/18/17 124/64   05/19/17 132/80   04/24/17 132/84    Weight from Last 3 Encounters:   03/15/18 115.7 kg (255 lb)   10/18/17 117.5 kg (259 lb)   05/19/17 113.4 kg (250 lb)              Today, you had the following     No orders found for display       Primary Care Provider Office Phone # Fax #    Mayelin Cherie Hernandez [phone]       [address] [place] [phone]        Equal Access to Services     [facility]: Hadii aad ku hadasho Soomaali, waaxda luqadaha, qaybta kaalmada adeegyada, waxay nika villalobos . So [facility] [phone].    ATENCIÓN: Si habla español, tiene a briceno disposición servicios gratuitos de asistencia lingüística. Llame al [phone].    We comply with applicable federal civil rights laws and [place] laws. We do not discriminate on the basis of race, color, national origin, age, disability, sex, sexual orientation, or gender " identity.            Thank you!     Thank you for choosing [facility]  for your care. Our goal is always to provide you with excellent care. Hearing back from our patients is one way we can continue to improve our services. Please take a few minutes to complete the written survey that you may receive in the mail after your visit with us. Thank you!             Your Updated Medication List - Protect others around you: Learn how to safely use, store and throw away your medicines at www.disposemymeds.org.          This list is accurate as of 3/15/18 10:25 AM.  Always use your most recent med list.                   Brand Name Dispense Instructions for use Diagnosis    albuterol 108 (90 BASE) MCG/ACT Inhaler    PROAIR HFA    18 g    Inhale 2 puffs into the lungs every 6 hours    Acute bronchospasm       aspirin 325 MG EC tablet     0    1 tab po QD (Once per day)        azithromycin 250 MG tablet    ZITHROMAX    6 tablet    Two tablets first day, then one tablet daily for four days.    Cough, Acute bronchitis, unspecified organism       blood glucose monitoring test strip    no brand specified    3 Box    1 strip by In Vitro route 2 times daily Test as directed    Type 2 diabetes mellitus with other circulatory complications       clopidogrel 75 MG tablet    PLAVIX    90 tablet    TAKE  ONE TABLET BY MOUTH EVERY DAY    CAD (coronary artery disease), Chronic ischemic heart disease, unspecified       CRESTOR 40 MG tablet   Generic drug:  rosuvastatin     90 tablet    Take 1 tablet (40 mg) by mouth daily    CAD (coronary artery disease), Hyperlipidemia LDL goal <70       glyBURIDE 5 MG tablet    DIABETA /MICRONASE    120 tablet    Take 2 tablets (10 mg) by mouth 2 times daily (with meals)    Type 2 diabetes mellitus without complication, without long-term current use of insulin (H)       ibuprofen 200 MG tablet    ADVIL/MOTRIN     Take 1,000 mg by mouth nightly as needed.        lisinopril 20 MG tablet     PRINIVIL/ZESTRIL    90 tablet    Take 1 tablet (20 mg) by mouth daily    Type 2 diabetes mellitus with other circulatory complications, HTN, goal below 130/80, Coronary artery disease involving native heart without angina pectoris, unspecified vessel or lesion type       metFORMIN 500 MG tablet    GLUCOPHAGE    120 tablet    Take 2 tablets (1,000 mg) by mouth 2 times daily (with meals)    Type 2 diabetes mellitus without complication, without long-term current use of insulin (H)       metoprolol tartrate 50 MG tablet    LOPRESSOR    180 tablet    Take 1 tablet (50 mg) by mouth 2 times daily    HTN, goal below 140/90       order for DME     1 packet    Glucose test strips Use 1-2 x daily    Type 2 diabetes, HbA1C goal < 8% (H)       pioglitazone 45 MG tablet    ACTOS    30 tablet    Take 1 tablet (45 mg) by mouth daily    Type 2 diabetes mellitus without complication, without long-term current use of insulin (H)       predniSONE 20 MG tablet    DELTASONE    5 tablet    Take 1 tablet (20 mg) by mouth daily    Acute bronchitis, unspecified organism, Acute bronchospasm       Pycnogenol 50 MG Caps     120 Cap    taking OPC3 2 caps per day    Chronic ischemic heart disease, unspecified

## 2018-03-19 DIAGNOSIS — E11.9 TYPE 2 DIABETES MELLITUS WITHOUT COMPLICATION, WITHOUT LONG-TERM CURRENT USE OF INSULIN (H): ICD-10-CM

## 2018-03-20 RX ORDER — GLYBURIDE 5 MG/1
TABLET ORAL
Qty: 120 TABLET | Refills: 0 | OUTPATIENT
Start: 2018-03-20

## 2018-03-20 RX ORDER — PIOGLITAZONEHYDROCHLORIDE 45 MG/1
TABLET ORAL
Qty: 30 TABLET | Refills: 0 | OUTPATIENT
Start: 2018-03-20

## 2018-03-20 NOTE — TELEPHONE ENCOUNTER
Actos, Glyburide, Metformin  Routing refill request to provider for review/approval because:  Patient needs to be seen because:  Overdue for diabetic visit   Nikki refill given    Next 5 appointments (look out 90 days)     Apr 11, 2018  9:30 AM CDT   [facility] Visit with Mayelin Hernandez MD   [facility]    [address] [place] [phone]   [phone]                Alexus Simmons, RN, BSN

## 2018-03-22 ENCOUNTER — MYC MEDICAL ADVICE (OUTPATIENT)
Dept: FAMILY MEDICINE | Facility: OTHER | Age: 68
End: 2018-03-22

## 2018-03-29 RX ORDER — PIOGLITAZONEHYDROCHLORIDE 45 MG/1
45 TABLET ORAL DAILY
Qty: 30 TABLET | Refills: 0 | Status: SHIPPED | OUTPATIENT
Start: 2018-03-29 | End: 2018-04-26

## 2018-03-29 RX ORDER — GLYBURIDE 5 MG/1
10 TABLET ORAL 2 TIMES DAILY WITH MEALS
Qty: 120 TABLET | Refills: 0 | Status: SHIPPED | OUTPATIENT
Start: 2018-03-29 | End: 2018-04-30

## 2018-03-29 NOTE — TELEPHONE ENCOUNTER
Patient called upset, he set up a appt a few weeks ago for 4-11-18. Can you please refill his medications before that?  He is out of town and he will be out when he gets back home
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
## 2018-04-11 ENCOUNTER — OFFICE VISIT (OUTPATIENT)
Dept: FAMILY MEDICINE | Facility: OTHER | Age: 68
End: 2018-04-11
Payer: COMMERCIAL

## 2018-04-11 VITALS
SYSTOLIC BLOOD PRESSURE: 126 MMHG | DIASTOLIC BLOOD PRESSURE: 64 MMHG | WEIGHT: 252 LBS | BODY MASS INDEX: 35.15 KG/M2 | TEMPERATURE: 97.2 F | HEART RATE: 67 BPM | OXYGEN SATURATION: 97 %

## 2018-04-11 DIAGNOSIS — Z12.11 SCREEN FOR COLON CANCER: ICD-10-CM

## 2018-04-11 DIAGNOSIS — Z13.5 SCREENING FOR DIABETIC RETINOPATHY: ICD-10-CM

## 2018-04-11 DIAGNOSIS — E11.9 TYPE 2 DIABETES MELLITUS WITHOUT COMPLICATION, WITHOUT LONG-TERM CURRENT USE OF INSULIN (H): Primary | ICD-10-CM

## 2018-04-11 DIAGNOSIS — Z23 NEED FOR PROPHYLACTIC VACCINATION AGAINST STREPTOCOCCUS PNEUMONIAE (PNEUMOCOCCUS): ICD-10-CM

## 2018-04-11 DIAGNOSIS — Z13.89 SCREENING FOR DIABETIC PERIPHERAL NEUROPATHY: ICD-10-CM

## 2018-04-11 LAB
CREAT UR-MCNC: 138 MG/DL
HBA1C MFR BLD: 6.8 % (ref 0–6.4)
MICROALBUMIN UR-MCNC: 54 MG/L
MICROALBUMIN/CREAT UR: 38.84 MG/G CR (ref 0–17)

## 2018-04-11 PROCEDURE — 99207 C FOOT EXAM  NO CHARGE: CPT | Mod: 25 | Performed by: FAMILY MEDICINE

## 2018-04-11 PROCEDURE — 82043 UR ALBUMIN QUANTITATIVE: CPT | Performed by: FAMILY MEDICINE

## 2018-04-11 PROCEDURE — 83036 HEMOGLOBIN GLYCOSYLATED A1C: CPT | Performed by: FAMILY MEDICINE

## 2018-04-11 PROCEDURE — 36415 COLL VENOUS BLD VENIPUNCTURE: CPT | Performed by: FAMILY MEDICINE

## 2018-04-11 PROCEDURE — 99213 OFFICE O/P EST LOW 20 MIN: CPT | Performed by: FAMILY MEDICINE

## 2018-04-11 NOTE — PATIENT INSTRUCTIONS
Your A1c is doing much better today, but your blood pressure is high.     Hemoglobin A1C   Date Value Ref Range Status   04/11/2018 6.8 (H) 0 - 6.4 % Final     Comment:     Normal <5.7% Prediabetes 5.7-6.4%  Diabetes 6.5% or higher - adopted from ADA   consensus guidelines.     10/18/2017 7.2 (H) 4.3 - 6.0 % Final   05/02/2017  4.3 - 6.0 % Corrected    DEL  Test canceled by physician  CORRECTED ON 05/03 AT 0811: PREVIOUSLY REPORTED AS 7.4     04/24/2017 7.2 (H) 4.3 - 6.0 % Final   05/09/2016 7.0 (H) 4.3 - 6.0 % Final     BP Readings from Last 5 Encounters:   04/11/18 142/78   10/18/17 124/64   05/19/17 132/80   04/24/17 132/84   12/21/16 157/69       Check your feet daily after you take your socks off to make sure you aren't developing any lesions or cracks that you can't notice.     Discussed and provided information and/or forms for completing an advance directive. After you have completed your advance directive, bring it in to the clinic to be entered into your chart. It needs to be signed by either 2 witnesses or a notary. A notary is available on staff most days of the week. More information and copies of these forms can be found at [email or web address]

## 2018-04-11 NOTE — MR AVS SNAPSHOT
After Visit Summary   4/11/2018    Genaro Barbosa    MRN: [id number]           Patient Information     Date Of Birth          1950        Visit Information        Provider Department      4/11/2018 9:30 AM Mayelin Hernandez MD [facility]        Today's Diagnoses     Type 2 diabetes mellitus without complication, without long-term current use of insulin (H)    -  1    Screen for colon cancer        Screening for diabetic retinopathy        Screening for diabetic peripheral neuropathy        Need for prophylactic vaccination against Streptococcus pneumoniae (pneumococcus)          Care Instructions    Your A1c is doing much better today, but your blood pressure is high.     Hemoglobin A1C   Date Value Ref Range Status   04/11/2018 6.8 (H) 0 - 6.4 % Final     Comment:     Normal <5.7% Prediabetes 5.7-6.4%  Diabetes 6.5% or higher - adopted from ADA   consensus guidelines.     10/18/2017 7.2 (H) 4.3 - 6.0 % Final   05/02/2017  4.3 - 6.0 % Corrected    DEL  Test canceled by physician  CORRECTED ON 05/03 AT 0811: PREVIOUSLY REPORTED AS 7.4     04/24/2017 7.2 (H) 4.3 - 6.0 % Final   05/09/2016 7.0 (H) 4.3 - 6.0 % Final     BP Readings from Last 5 Encounters:   04/11/18 142/78   10/18/17 124/64   05/19/17 132/80   04/24/17 132/84   12/21/16 157/69       Check your feet daily after you take your socks off to make sure you aren't developing any lesions or cracks that you can't notice.     Discussed and provided information and/or forms for completing an advance directive. After you have completed your advance directive, bring it in to the clinic to be entered into your chart. It needs to be signed by either 2 witnesses or a notary. A notary is available on staff most days of the week. More information and copies of these forms can be found at [email or web address]            Follow-ups after your visit        Follow-up notes from your care team     Return in about 6 months (around  10/11/2018) for Diabetic Re-check.      Future tests that were ordered for you today     Open Future Orders        Priority Expected Expires Ordered    Hemoglobin A1c Routine  10/8/2018 4/11/2018    Lipid panel reflex to direct LDL Fasting Routine  10/8/2018 4/11/2018    Comprehensive metabolic panel Routine  10/8/2018 4/11/2018    Fecal colorectal cancer screen (FIT) Routine 4/26/2018 6/28/2018 4/11/2018            Who to contact     If you have questions or need follow up information about today's clinic visit or your schedule please contact [facility] directly at [phone].  Normal or non-critical lab and imaging results will be communicated to you by MyChart, letter or phone within 4 business days after the clinic has received the results. If you do not hear from us within 7 days, please contact the clinic through [employer] or phone. If you have a critical or abnormal lab result, we will notify you by phone as soon as possible.  Submit refill requests through [employer] or call your pharmacy and they will forward the refill request to us. Please allow 3 business days for your refill to be completed.          Additional Information About Your Visit        [employer] Information     [employer] gives you secure access to your electronic health record. If you see a primary care provider, you can also send messages to your care team and make appointments. If you have questions, please call your primary care clinic.  If you do not have a primary care provider, please call [phone] and they will assist you.        Care EveryWhere ID     This is your Care EveryWhere ID. This could be used by other organizations to access your [facility] medical records  [id number]        Your Vitals Were     Pulse Temperature Pulse Oximetry BMI (Body Mass Index)          67 97.2  F (36.2  C) (Temporal) 97% 35.15 kg/m2         Blood Pressure from Last 3 Encounters:   04/11/18 126/64   10/18/17 124/64   05/19/17 132/80     Weight from Last 3 Encounters:   04/11/18 252 lb (114.3 kg)   03/15/18 255 lb (115.7 kg)   10/18/17 259 lb (117.5 kg)              We Performed the Following     Albumin Random Urine Quantitative with Creat Ratio     FOOT EXAM  NO CHARGE [[id number]]     Hemoglobin A1c        Primary Care Provider Office Phone # Fax #    Mayelin Hernandez [phone]       [address] [place] [id number]        Equal Access to Services     KAMRON BERGER : Hadii aad ku hadasho Soomaali, waaxda luqadaha, qaybta kaalmada adeegyada, waxay idiin hayaan adeeg velma villalobos . So [facility] [phone].    ATENCIÓN: Si habla español, tiene a briceno disposición servicios gratuitos de asistencia lingüística. [facility] [phone].    We comply with applicable federal civil rights laws and [place] laws. We do not discriminate on the basis of race, color, national origin, age, disability, sex, sexual orientation, or gender identity.            Thank you!     Thank you for choosing [facility]  for your care. Our goal is always to provide you with excellent care. Hearing back from our patients is one way we can continue to improve our services. Please take a few minutes to complete the written survey that you may receive in the mail after your visit with us. Thank you!             Your Updated Medication List - Protect others around you: Learn how to safely use, store and throw away your medicines at www.disposemymeds.org.          This list is accurate as of 4/11/18 12:03 PM.  Always use your most recent med list.                   Brand Name Dispense Instructions for use Diagnosis    albuterol 108 (90 Base) MCG/ACT Inhaler    PROAIR HFA    18 g    Inhale 2 puffs into the lungs every 6 hours    Acute bronchospasm       aspirin 325 MG EC tablet     0    1 tab po QD (Once per day)        blood glucose monitoring test strip    no brand specified    3 Box    1 strip by In Vitro route 2 times daily Test as directed    Type  2 diabetes mellitus with other circulatory complications       clopidogrel 75 MG tablet    PLAVIX    90 tablet    TAKE  ONE TABLET BY MOUTH EVERY DAY    CAD (coronary artery disease), Chronic ischemic heart disease, unspecified       CRESTOR 40 MG tablet   Generic drug:  rosuvastatin     90 tablet    Take 1 tablet (40 mg) by mouth daily    CAD (coronary artery disease), Hyperlipidemia LDL goal <70       glyBURIDE 5 MG tablet    DIABETA /MICRONASE    120 tablet    Take 2 tablets (10 mg) by mouth 2 times daily (with meals)    Type 2 diabetes mellitus without complication, without long-term current use of insulin (H)       ibuprofen 200 MG tablet    ADVIL/MOTRIN     Take 1,000 mg by mouth nightly as needed.        lisinopril 20 MG tablet    PRINIVIL/ZESTRIL    90 tablet    Take 1 tablet (20 mg) by mouth daily    Type 2 diabetes mellitus with other circulatory complications, HTN, goal below 130/80, Coronary artery disease involving native heart without angina pectoris, unspecified vessel or lesion type       metFORMIN 500 MG tablet    GLUCOPHAGE    120 tablet    Take 2 tablets (1,000 mg) by mouth 2 times daily (with meals)    Type 2 diabetes mellitus without complication, without long-term current use of insulin (H)       metoprolol tartrate 50 MG tablet    LOPRESSOR    180 tablet    Take 1 tablet (50 mg) by mouth 2 times daily    HTN, goal below 140/90       order for DME     1 packet    Glucose test strips Use 1-2 x daily    Type 2 diabetes, HbA1C goal < 8% (H)       pioglitazone 45 MG tablet    ACTOS    30 tablet    Take 1 tablet (45 mg) by mouth daily    Type 2 diabetes mellitus without complication, without long-term current use of insulin (H)       Pycnogenol 50 MG Caps     120 Cap    taking OPC3 2 caps per day    Chronic ischemic heart disease, unspecified

## 2018-04-11 NOTE — PROGRESS NOTES
Genaro, your results show kidneys have improved with improved blood sugar control. No med changes needed.   Please let me know if you have any questions.    Mayelin Hernandez MD

## 2018-04-12 ENCOUNTER — MYC MEDICAL ADVICE (OUTPATIENT)
Dept: FAMILY MEDICINE | Facility: OTHER | Age: 68
End: 2018-04-12

## 2018-04-19 DIAGNOSIS — E11.9 TYPE 2 DIABETES MELLITUS WITHOUT COMPLICATION, WITHOUT LONG-TERM CURRENT USE OF INSULIN (H): ICD-10-CM

## 2018-04-19 LAB — HEMOCCULT STL QL IA: NEGATIVE

## 2018-04-19 PROCEDURE — 82274 ASSAY TEST FOR BLOOD FECAL: CPT | Performed by: FAMILY MEDICINE

## 2018-04-26 DIAGNOSIS — E11.9 TYPE 2 DIABETES MELLITUS WITHOUT COMPLICATION, WITHOUT LONG-TERM CURRENT USE OF INSULIN (H): ICD-10-CM

## 2018-04-26 RX ORDER — PIOGLITAZONEHYDROCHLORIDE 45 MG/1
TABLET ORAL
Qty: 90 TABLET | Refills: 1 | Status: SHIPPED | OUTPATIENT
Start: 2018-04-26 | End: 2018-11-14

## 2018-04-26 NOTE — TELEPHONE ENCOUNTER
"Requested Prescriptions   Pending Prescriptions Disp Refills     pioglitazone (ACTOS) 45 MG tablet [Pharmacy Med Name: PIOGLITAZONE HCL 45 MG TABLET] 30 tablet 0     Sig: TAKE ONE TABLET BY MOUTH ONCE DAILY    Thiazolidinedione Agents (TZDs)  Passed    4/26/2018  1:20 AM       Passed - Blood pressure less than 140/90 in past 6 months    BP Readings from Last 3 Encounters:   04/11/18 126/64   10/18/17 124/64   05/19/17 132/80          Passed - Patient has documented LDL within the past 12 mos.    Recent Labs   Lab Test  05/02/17   0832   LDL  44          Passed - Patient has a normal ALT within the past 12 mos.    Recent Labs   Lab Test  10/18/17   0956   ALT  29          Passed - Patient has a normal AST within the past 12 mos.     Recent Labs   Lab Test  10/18/17   0956   AST  22          Passed - Patient has had a Microalbumin in the past 12 mos.    Recent Labs   Lab Test  04/11/18   1018   MICROL  54   UMALCR  38.84*          Passed - Patient has documented A1c within the specified period of time.    Recent Labs   Lab Test  04/11/18   0928   A1C  6.8*          Passed - Diagnosis not CHF       Passed - Patient is age 18 or older       Passed - Patient has a normal serum Creatinine in the past 12 months    Recent Labs   Lab Test  10/18/17   0956   CR  0.98          Passed - Recent (6 mo) or future (30 days) visit within the authorizing provider's specialty    Patient had office visit in the last 6 months or has a visit in the next 30 days with authorizing provider or within the authorizing provider's specialty.  See \"Patient Info\" tab in inbasket, or \"Choose Columns\" in Meds & Orders section of the refill encounter.            pioglitazone (ACTOS) 45 MG tablet  Prescription approved per [facility] Refill Protocol.    Fide Nino, RN, BSN     "

## 2018-04-30 ENCOUNTER — MYC MEDICAL ADVICE (OUTPATIENT)
Dept: FAMILY MEDICINE | Facility: OTHER | Age: 68
End: 2018-04-30

## 2018-04-30 DIAGNOSIS — E11.9 TYPE 2 DIABETES MELLITUS WITHOUT COMPLICATION, WITHOUT LONG-TERM CURRENT USE OF INSULIN (H): ICD-10-CM

## 2018-04-30 DIAGNOSIS — E11.59 TYPE 2 DIABETES MELLITUS WITH CIRCULATORY DISORDER, WITHOUT LONG-TERM CURRENT USE OF INSULIN (H): Primary | ICD-10-CM

## 2018-04-30 RX ORDER — GLYBURIDE 5 MG/1
TABLET ORAL
Qty: 120 TABLET | Refills: 4 | Status: SHIPPED | OUTPATIENT
Start: 2018-04-30 | End: 2018-09-20

## 2018-04-30 NOTE — TELEPHONE ENCOUNTER
Reason for Call:  Other prescription    Detailed comments: Patient calling very upset due to that his wife went to pharmacy a few days a go and they had sent over a request for a refill and we never received it. They sent another request over today. Patient stated that he is com[pletly out of his medications now now and hopes that he can get this picked up tonight.  We are to call patient either way if we can get this script filled or not today  Phone Number Patient can be reached at: Cell number on file:    Telephone Information:   Mobile [phone]       Best Time: asap    Can we leave a detailed message on this number? YES    Call taken on 4/30/2018 at 4:04 PM by Jacki Saini

## 2018-04-30 NOTE — TELEPHONE ENCOUNTER
"Requested Prescriptions   Pending Prescriptions Disp Refills     glyBURIDE (DIABETA /MICRONASE) 5 MG tablet [Pharmacy Med Name: GLYBURIDE 5 MG TABLET] 120 tablet 5     Sig: TAKE TWO TABLETS BY MOUTH TWICE DAILY WITH MEALS    Sulfonylurea Agents Passed    4/30/2018  4:07 PM       Passed - Blood pressure less than 140/90 in past 6 months    BP Readings from Last 3 Encounters:   04/11/18 126/64   10/18/17 124/64   05/19/17 132/80                Passed - Patient has documented LDL within the past 12 mos.    Recent Labs   Lab Test  05/02/17   0832   LDL  44            Passed - Patient has had a Microalbumin in the past 12 mos.    Recent Labs   Lab Test  04/11/18   1018   MICROL  54   UMALCR  38.84*            Passed - Patient has documented A1c within the specified period of time.    Recent Labs   Lab Test  04/11/18   0928   A1C  6.8*            Passed - Patient is age 18 or older       Passed - Patient has a recent creatinine (normal) within the past 12 mos.    Recent Labs   Lab Test  10/18/17   0956   CR  0.98            Passed - Recent (6 mo) or future (30 days) visit within the authorizing provider's specialty    Patient had office visit in the last 6 months or has a visit in the next 30 days with authorizing provider or within the authorizing provider's specialty.  See \"Patient Info\" tab in inbasket, or \"Choose Columns\" in Meds & Orders section of the refill encounter.            metFORMIN (GLUCOPHAGE) 500 MG tablet [Pharmacy Med Name: METFORMIN  MG TABLET] 120 tablet 5     Sig: TAKE TWO TABLETS BY MOUTH TWICE DAILY WITH MEALS    Biguanide Agents Passed    4/30/2018  4:07 PM       Passed - Blood pressure less than 140/90 in past 6 months    BP Readings from Last 3 Encounters:   04/11/18 126/64   10/18/17 124/64   05/19/17 132/80                Passed - Patient has documented LDL within the past 12 mos.    Recent Labs   Lab Test  05/02/17   0832   LDL  44            Passed - Patient has had a Microalbumin in " "the past 12 mos.    Recent Labs   Lab Test  04/11/18   1018   MICROL  54   UMALCR  38.84*            Passed - Patient is age 10 or older       Passed - Patient has documented A1c within the specified period of time.    Recent Labs   Lab Test  04/11/18   0928   A1C  6.8*            Passed - Patient's CR is NOT>1.4 OR Patient's EGFR is NOT<45 within past 12 mos.    Recent Labs   Lab Test  10/18/17   0956   GFRESTIMATED  75   GFRESTBLACK  >90       Recent Labs   Lab Test  10/18/17   0956   CR  0.98            Passed - Patient does NOT have a diagnosis of CHF.       Passed - Recent (6 mo) or future (30 days) visit within the authorizing provider's specialty    Patient had office visit in the last 6 months or has a visit in the next 30 days with authorizing provider or within the authorizing provider's specialty.  See \"Patient Info\" tab in inbasket, or \"Choose Columns\" in Meds & Orders section of the refill encounter.            Prescription approved per [facility] [place].  Notified pt.    Jia Gonzalez RN    "

## 2018-04-30 NOTE — TELEPHONE ENCOUNTER
pioglitazone (ACTOS) 45 MG tablet  Rx was sent 04/26/2018 for 90 tabs and 1 refills.   Pharmacy notified via E-prescribe refusal.  Fide Nino RN, BSN     metFORMIN (GLUCOPHAGE) 500 MG tablet  Rx was sent 04/30/2018 for 120 tabs and 4 refills.   Pharmacy notified via E-prescribe refusal.  Fide Nino RN, BSN     glyBURIDE (DIABETA /MICRONASE) 5 MG tablet  Rx was sent 04/30/2018 for 120 tabs and 4 refills.   Pharmacy notified via E-prescribe refusal.  Fide Nino RN, BSN     blood glucose monitoring (NO BRAND SPECIFIED) test strip  Prescription approved per [facility] Refill Protocol.    Responded via [employer]. Fide Nino RN, BSN

## 2018-05-18 ENCOUNTER — TELEPHONE (OUTPATIENT)
Dept: FAMILY MEDICINE | Facility: OTHER | Age: 68
End: 2018-05-18

## 2018-05-18 ENCOUNTER — MYC MEDICAL ADVICE (OUTPATIENT)
Dept: FAMILY MEDICINE | Facility: OTHER | Age: 68
End: 2018-05-18

## 2018-05-18 DIAGNOSIS — E11.9 TYPE 2 DIABETES, HBA1C GOAL < 7% (H): Primary | ICD-10-CM

## 2018-05-18 DIAGNOSIS — E11.59 TYPE 2 DIABETES MELLITUS WITH CIRCULATORY DISORDER, WITHOUT LONG-TERM CURRENT USE OF INSULIN (H): ICD-10-CM

## 2018-05-30 ENCOUNTER — TRANSFERRED RECORDS (OUTPATIENT)
Dept: HEALTH INFORMATION MANAGEMENT | Facility: CLINIC | Age: 68
End: 2018-05-30

## 2018-06-04 ENCOUNTER — MYC MEDICAL ADVICE (OUTPATIENT)
Dept: FAMILY MEDICINE | Facility: OTHER | Age: 68
End: 2018-06-04

## 2018-06-04 DIAGNOSIS — E11.9 TYPE 2 DIABETES MELLITUS WITHOUT COMPLICATION, WITHOUT LONG-TERM CURRENT USE OF INSULIN (H): ICD-10-CM

## 2018-06-06 RX ORDER — PIOGLITAZONEHYDROCHLORIDE 45 MG/1
45 TABLET ORAL DAILY
Qty: 90 TABLET | Refills: 1 | OUTPATIENT
Start: 2018-06-06

## 2018-06-06 NOTE — TELEPHONE ENCOUNTER
"Requested Prescriptions   Pending Prescriptions Disp Refills     pioglitazone (ACTOS) 45 MG tablet 90 tablet 1     Sig: Take 1 tablet (45 mg) by mouth daily    Thiazolidinedione Agents (TZDs)  Failed    6/4/2018  6:56 PM       Failed - Patient has documented LDL within the past 12 mos.    Recent Labs   Lab Test  05/02/17   0832   LDL  44          Passed - Blood pressure less than 140/90 in past 6 months    BP Readings from Last 3 Encounters:   04/11/18 126/64   10/18/17 124/64   05/19/17 132/80          Passed - Patient has a normal ALT within the past 12 mos.    Recent Labs   Lab Test  10/18/17   0956   ALT  29          Passed - Patient has a normal AST within the past 12 mos.     Recent Labs   Lab Test  10/18/17   0956   AST  22          Passed - Patient has had a Microalbumin in the past 12 mos.    Recent Labs   Lab Test  04/11/18   1018   MICROL  54   UMALCR  38.84*          Passed - Patient has documented A1c within the specified period of time.    If HgbA1C is 8 or greater, it needs to be on file within the past 3 months.  If less than 8, must be on file within the past 6 months.     Recent Labs   Lab Test  04/11/18   0928   A1C  6.8*          Passed - Diagnosis not CHF       Passed - Patient is age 18 or older       Passed - Patient has a normal serum Creatinine in the past 12 months    Recent Labs   Lab Test  10/18/17   0956   CR  0.98          Passed - Recent (6 mo) or future (30 days) visit within the authorizing provider's specialty    Patient had office visit in the last 6 months or has a visit in the next 30 days with authorizing provider or within the authorizing provider's specialty.  See \"Patient Info\" tab in inbasket, or \"Choose Columns\" in Meds & Orders section of the refill encounter.            Last ov 04/11/2018 script was filled 04/26/2018 for 6 months    Hakan Carvalho, RN, BSN        "

## 2018-06-21 ENCOUNTER — OFFICE VISIT (OUTPATIENT)
Dept: ORTHOPEDICS | Facility: CLINIC | Age: 68
End: 2018-06-21
Payer: COMMERCIAL

## 2018-06-21 VITALS
BODY MASS INDEX: 33.32 KG/M2 | DIASTOLIC BLOOD PRESSURE: 81 MMHG | SYSTOLIC BLOOD PRESSURE: 172 MMHG | RESPIRATION RATE: 18 BRPM | WEIGHT: 246 LBS | HEIGHT: 72 IN | TEMPERATURE: 97.1 F

## 2018-06-21 DIAGNOSIS — M12.811 ROTATOR CUFF TEAR ARTHROPATHY OF RIGHT SHOULDER: Primary | ICD-10-CM

## 2018-06-21 DIAGNOSIS — M75.101 ROTATOR CUFF TEAR ARTHROPATHY OF RIGHT SHOULDER: Primary | ICD-10-CM

## 2018-06-21 PROCEDURE — 99213 OFFICE O/P EST LOW 20 MIN: CPT | Mod: 25 | Performed by: ORTHOPAEDIC SURGERY

## 2018-06-21 PROCEDURE — 20610 DRAIN/INJ JOINT/BURSA W/O US: CPT | Mod: RT | Performed by: ORTHOPAEDIC SURGERY

## 2018-06-21 RX ORDER — METHYLPREDNISOLONE ACETATE 80 MG/ML
80 INJECTION, SUSPENSION INTRA-ARTICULAR; INTRALESIONAL; INTRAMUSCULAR; SOFT TISSUE ONCE
Qty: 1 ML | Refills: 0 | OUTPATIENT
Start: 2018-06-21 | End: 2018-06-21

## 2018-06-21 NOTE — PROGRESS NOTES
Visit Date:   2018      HISTORY OF PRESENT ILLNESS:  Mr. Villanueva is a 68-year-old male seen for evaluation of right shoulder pain.  He has known cuff tear arthropathy on the left shoulder and has had periodic steroid injections there that have worked well, last time was 03/15/2018.  He has had problems with his right shoulder previously also with unrepairable rotator cuff tear found in , along with complete tear of the long head of the biceps tendon at that time.  More recently, he has developed problems since early April with the right arm pain while working in the yard.  He has noted with twisting maneuvers with the forearm that he has pain in the upper arm near the biceps.  He has dull, shooting pains rated between 5 and 7 out of 10.  Rest seems to help.  He is wondering if injection or something can be done on the right shoulder.  He has had friends who have had problems with hip infection after hip replacement and he is not anxious to have any surgery.      PHYSICAL EXAMINATION:  Shows full motion of the shoulders, does have pain reaching overhead with the right.  Left is better.  He has weakness of resisted external rotation on both shoulders and weakness of abduction.  Left shoulder is not painful.  Right shoulder is.  Sensation and circulation are intact to the arms.      IMPRESSION:  Right shoulder cuff tear arthropathy.  We discussed options and have decided to proceed with steroid injection.  This was performed with 80 mg Depo-Medrol and lidocaine in the subacromial space.  I have told him we can repeat this every few months if desired.  He would also be a candidate for a reverse total shoulder arthroplasty if he ever chose to do this.         TI HUMPHREY MD             D: 2018   T: 2018   MT: JUAREZ      Name:     CYNDIE VILLANUEVA   MRN:      [id number]        Account:      [id number]   :      1950           Visit Date:   2018      Document: [id number]

## 2018-06-21 NOTE — PATIENT INSTRUCTIONS
You have had a steroid injection today.  For the first 2 hours there will likely be some numbing in the joint from the lidocaine.  This is a good sign, indicating that the injection is in the right place.  In 2 hours the lidocaine will wear off, and the joint will hurt like you had a shot.  Each day the cortisone makes it feel better.  It reaches peak effect in 2 weeks.  We expect it to last for 3 months.  You may resume regular activity when you feel ready.  If you are diabetic, your glucoses will be quite high for several days.    We can repeat injections every few months if needed.  We also have the option of a reverse total shoulder arthroplasty if desired.

## 2018-06-21 NOTE — PROGRESS NOTES
The patient's right shoulder was prepped with betadine solution after verification of allergies. Area approximately 10 cm x 10 cm prepped in a sterile fashion. After injection, betadine removed with soap and water and band-aids applied.    1ml depo-medrol with 1% lidocaine plain injected into patient's right shoulder by Dr. Mac Smalls  LOT# [id number]  Exp. 12/2018    Sloan Rao PA-C  Supervising physician: Mac Smalls MD  Dept. of Orthopedics  [facility]

## 2018-06-21 NOTE — PROGRESS NOTES
Genaro Barbosa is a 68 year old male who is seen as self referral for right shoulder pain.    Past Medical History:   Diagnosis Date     Coronary artery disease     stents x8     Diabetic eye exam (H) 10/11/12    [facility]     Fatty liver      Gallstones 2/1/11    hospitalized with RUQ pain     History of tobacco use: 1966 - 1976, 1/2 ppd 10/3/2011     Hyperlipidemia LDL goal < 100      Hypertension goal BP (blood pressure) < 130/80      Pyloric stenosis, congenital      Type 2 diabetes, HbA1c goal < 7% (H)        Past Surgical History:   Procedure Laterality Date     CARDIAC SURGERY      Angioplasty with stenting     HC PTA EXTREMITY ARTERY, EACH ADDITIONAL  1991     pyloric stenosis         Family History   Problem Relation Age of Onset     C.A.D. Mother      Hypertension Mother      Circulatory Mother      blood clots     HEART DISEASE Mother      heart attack     Lipids Mother      Diabetes Father      Genitourinary Problems Brother      kidnety problems; dialysis     Asthma Son      Family History Negative No family hx of      Cerebrovascular Disease No family hx of      Breast Cancer No family hx of      Cancer - colorectal No family hx of      Prostate Cancer No family hx of      Alzheimer Disease No family hx of      Arthritis No family hx of      Blood Disease No family hx of      Cancer No family hx of      Eye Disorder No family hx of      GASTROINTESTINAL DISEASE No family hx of      Musculoskeletal Disorder No family hx of      Neurologic Disorder No family hx of      Respiratory No family hx of      Thyroid Disease No family hx of      Alcohol/Drug No family hx of      Allergies No family hx of      Anesthesia Reaction No family hx of      Cardiovascular No family hx of      Congenital Anomalies No family hx of      Connective Tissue Disorder No family hx of      Depression No family hx of      Endocrine Disease No family hx of      Gynecology No family hx of      Obesity No family hx of       Osteoperosis No family hx of      Psychotic Disorder No family hx of      Hearing Loss No family hx of        Social History     Social History     Marital status:      Spouse name: Maame     Number of children: 2     Years of education: N/A     Occupational History     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 Yes     Mother     Social History Narrative       Current Outpatient Prescriptions   Medication Sig Dispense Refill     albuterol (ALBUTEROL) 108 (90 BASE) MCG/ACT Inhaler Inhale 2 puffs into the lungs every 6 hours 18 g 3     ASPIRIN 325 MG OR TBEC 1 tab po QD (Once per day) 0 3     blood glucose monitoring (NO BRAND SPECIFIED) test strip Use to test blood sugar 2 times daily or as directed.  ACCU-CHEK JUAN 100 strip 11     blood glucose monitoring (NO BRAND SPECIFIED) test strip 1 strip by In Vitro route 2 times daily Test as directed 3 Box 12     clopidogrel (PLAVIX) 75 MG tablet TAKE  ONE TABLET BY MOUTH EVERY DAY 90 tablet 3     glyBURIDE (DIABETA /MICRONASE) 5 MG tablet TAKE TWO TABLETS BY MOUTH TWICE DAILY WITH MEALS 120 tablet 4     ibuprofen (ADVIL,MOTRIN) 200 MG tablet Take 1,000 mg by mouth nightly as needed.       lisinopril (PRINIVIL,ZESTRIL) 20 MG tablet Take 1 tablet (20 mg) by mouth daily 90 tablet 1     metFORMIN (GLUCOPHAGE) 500 MG tablet TAKE TWO TABLETS BY MOUTH TWICE DAILY WITH MEALS 120 tablet 4     metoprolol (LOPRESSOR) 50 MG tablet Take 1 tablet (50 mg) by mouth 2 times daily 180 tablet 3     ORDER FOR DME Glucose test strips  Use 1-2 x daily 1 packet 5     pioglitazone (ACTOS) 45 MG tablet TAKE ONE TABLET BY  MOUTH ONCE DAILY 90 tablet 1     PYCNOGENOL 50 MG PO CAPS taking OPC3 2 caps per day 120 Cap prn     rosuvastatin (CRESTOR) 40 MG tablet Take 1 tablet (40 mg) by mouth daily 90 tablet 3       Allergies   Allergen Reactions     Ace Inhibitors Cough     Contrast Dye Other (See Comments)     Patient felt like ants were crawling all over him/ per patient's explaination 12-15-16/tw       REVIEW OF SYSTEMS:  CONSTITUTIONAL:  NEGATIVE for fever, chills, change in weight, not feeling tired  SKIN:  NEGATIVE for worrisome rashes, no skin lumps, no skin ulcers and no non-healing wounds  EYES:  NEGATIVE for vision changes or irritation.  ENT/MOUTH:  NEGATIVE.  No hearing loss, no hoarseness, no difficulty swallowing.  RESP:  NEGATIVE. No cough or shortness of breath.  CV:  NEGATIVE for chest pain, palpitations or peripheral edema  GI:  NEGATIVE for nausea, abdominal pain, heartburn, or change in bowel habits  :  Negative. No dysuria, no hematuria  MUSCULOSKELETAL:  See HPI above  NEURO:  NEGATIVE . No headaches, no dizziness,  no numbness  ENDOCRINE:  NEGATIVE for temperature intolerance, skin/hair changes  HEME/ALLERGY/IMMUNE:  NEGATIVE for bleeding problems  PSYCHIATRIC:  NEGATIVE. no anxiety, no depression.     Exam:  Vitals: /81  Temp 97.1  F (36.2  C)  Resp 18  Ht 1.829 m (6')  Wt 111.6 kg (246 lb)  BMI 33.36 kg/m2  BMI= Body mass index is 33.36 kg/(m^2).  Constitutional:  healthy, alert and no distress  Neuro: Alert and Oriented x 3, Gait normal. Sensation grossly WNL.  Psych: Affect normal   Respiratory: Breathing not labored.  Cardiovascular: normal peripheral pulses  Lymph: no adenopathy  Skin: No rashes,worrisome lesions or skin problems

## 2018-06-21 NOTE — MR AVS SNAPSHOT
After Visit Summary   6/21/2018    Genaro Barbosa    MRN: [id number]           Patient Information     Date Of Birth          1950        Visit Information        Provider Department      6/21/2018 9:45 AM Mac Smalls MD [facility]        Care Instructions    You have had a steroid injection today.  For the first 2 hours there will likely be some numbing in the joint from the lidocaine.  This is a good sign, indicating that the injection is in the right place.  In 2 hours the lidocaine will wear off, and the joint will hurt like you had a shot.  Each day the cortisone makes it feel better.  It reaches peak effect in 2 weeks.  We expect it to last for 3 months.  You may resume regular activity when you feel ready.  If you are diabetic, your glucoses will be quite high for several days.    We can repeat injections every few months if needed.  We also have the option of a reverse total shoulder arthroplasty if desired.          Follow-ups after your visit        Who to contact     If you have questions or need follow up information about today's clinic visit or your schedule please contact [facility] directly at [phone].  Normal or non-critical lab and imaging results will be communicated to you by [facility], letter or phone within 4 business days after the clinic has received the results. If you do not hear from us within 7 days, please contact the clinic through [facility] or phone. If you have a critical or abnormal lab result, we will notify you by phone as soon as possible.  Submit refill requests through [facility] or call your pharmacy and they will forward the refill request to us. Please allow 3 business days for your refill to be completed.          Additional Information About Your Visit        [facility] Information     [facility] gives you secure access to your electronic health record. If you see a primary care provider, you can also send messages to your  care team and make appointments. If you have questions, please call your primary care clinic.  If you do not have a primary care provider, please call [phone] and they will assist you.        Care EveryWhere ID     This is your Care EveryWhere ID. This could be used by other organizations to access your [place] medical records  [id number]        Your Vitals Were     Temperature Respirations Height BMI (Body Mass Index)          97.1  F (36.2  C) 18 1.829 m (6') 33.36 kg/m2         Blood Pressure from Last 3 Encounters:   06/21/18 172/81   04/11/18 126/64   10/18/17 124/64    Weight from Last 3 Encounters:   06/21/18 111.6 kg (246 lb)   04/11/18 114.3 kg (252 lb)   03/15/18 115.7 kg (255 lb)              Today, you had the following     No orders found for display       Primary Care Provider Office Phone # Fax #    Mayelin Cherie Hernandez [phone]       [address] [place] [id number]        Equal Access to Services     [facility]: Hadii awilda ku hadasho Sodrew, waaxda luqadaha, qaybta kaalmada adenasim, kristin villalobos . So [facility] [phone].    ATENCIÓN: Si habla español, tiene a briceno disposición servicios gratuitos de asistencia lingüística. Llame al [phone].    We comply with applicable federal civil rights laws and [place] laws. We do not discriminate on the basis of race, color, national origin, age, disability, sex, sexual orientation, or gender identity.            Thank you!     Thank you for choosing [facility] (formerly [facility]  for your care. Our goal is always to provide you with excellent care. Hearing back from our patients is one way we can continue to improve our services. Please take a few minutes to complete the written survey that you may receive in the mail after your visit with us. Thank you!             Your Updated Medication List - Protect others around you: Learn how to safely use, store and throw away your medicines at  www.disposemymeds.org.          This list is accurate as of 6/21/18 10:32 AM.  Always use your most recent med list.                   Brand Name Dispense Instructions for use Diagnosis    albuterol 108 (90 Base) MCG/ACT Inhaler    PROAIR HFA    18 g    Inhale 2 puffs into the lungs every 6 hours    Acute bronchospasm       aspirin 325 MG EC tablet     0    1 tab po QD (Once per day)        * blood glucose monitoring test strip    no brand specified    3 Box    1 strip by In Vitro route 2 times daily Test as directed    Type 2 diabetes mellitus with circulatory disorder, without long-term current use of insulin (H)       * blood glucose monitoring test strip    no brand specified    100 strip    Use to test blood sugar 2 times daily or as directed.  ACCU-CHEK JUAN    Type 2 diabetes, HbA1c goal < 7% (H), Type 2 diabetes mellitus with circulatory disorder, without long-term current use of insulin (H)       clopidogrel 75 MG tablet    PLAVIX    90 tablet    TAKE  ONE TABLET BY MOUTH EVERY DAY    CAD (coronary artery disease), Chronic ischemic heart disease, unspecified       CRESTOR 40 MG tablet   Generic drug:  rosuvastatin     90 tablet    Take 1 tablet (40 mg) by mouth daily    CAD (coronary artery disease), Hyperlipidemia LDL goal <70       glyBURIDE 5 MG tablet    DIABETA /MICRONASE    120 tablet    TAKE TWO TABLETS BY MOUTH TWICE DAILY WITH MEALS    Type 2 diabetes mellitus without complication, without long-term current use of insulin (H)       ibuprofen 200 MG tablet    ADVIL/MOTRIN     Take 1,000 mg by mouth nightly as needed.        lisinopril 20 MG tablet    PRINIVIL/ZESTRIL    90 tablet    Take 1 tablet (20 mg) by mouth daily    Type 2 diabetes mellitus with other circulatory complications, HTN, goal below 130/80, Coronary artery disease involving native heart without angina pectoris, unspecified vessel or lesion type       metFORMIN 500 MG tablet    GLUCOPHAGE    120 tablet    TAKE TWO TABLETS BY MOUTH  TWICE DAILY WITH MEALS    Type 2 diabetes mellitus without complication, without long-term current use of insulin (H)       metoprolol tartrate 50 MG tablet    LOPRESSOR    180 tablet    Take 1 tablet (50 mg) by mouth 2 times daily    HTN, goal below 140/90       order for DME     1 packet    Glucose test strips Use 1-2 x daily    Type 2 diabetes, HbA1C goal < 8% (H)       pioglitazone 45 MG tablet    ACTOS    90 tablet    TAKE ONE TABLET BY MOUTH ONCE DAILY    Type 2 diabetes mellitus without complication, without long-term current use of insulin (H)       Pycnogenol 50 MG Caps     120 Cap    taking OPC3 2 caps per day    Chronic ischemic heart disease, unspecified       * Notice:  This list has 2 medication(s) that are the same as other medications prescribed for you. Read the directions carefully, and ask your doctor or other care provider to review them with you.

## 2018-06-21 NOTE — LETTER
6/21/2018         RE: Genaro Barbosa  [address] [facility] [address]        Dear Colleague,    Thank you for referring your patient, Genaro Barbosa, to the [facility]. Please see a copy of my visit note below.    Genaro Barbosa is a 68 year old male who is seen as self referral for right shoulder pain.    Past Medical History:   Diagnosis Date     Coronary artery disease     stents x8     Diabetic eye exam (H) 10/11/12    [facility]     Fatty liver      Gallstones 2/1/11    hospitalized with RUQ pain     History of tobacco use: 1966 - 1976, 1/2 ppd 10/3/2011     Hyperlipidemia LDL goal < 100      Hypertension goal BP (blood pressure) < 130/80      Pyloric stenosis, congenital      Type 2 diabetes, HbA1c goal < 7% (H)        Past Surgical History:   Procedure Laterality Date     CARDIAC SURGERY      Angioplasty with stenting     HC PTA EXTREMITY ARTERY, EACH ADDITIONAL  1991     pyloric stenosis         Family History   Problem Relation Age of Onset     C.A.D. Mother      Hypertension Mother      Circulatory Mother      blood clots     HEART DISEASE Mother      heart attack     Lipids Mother      Diabetes Father      Genitourinary Problems Brother      kidnety problems; dialysis     Asthma Son      Family History Negative No family hx of      Cerebrovascular Disease No family hx of      Breast Cancer No family hx of      Cancer - colorectal No family hx of      Prostate Cancer No family hx of      Alzheimer Disease No family hx of      Arthritis No family hx of      Blood Disease No family hx of      Cancer No family hx of      Eye Disorder No family hx of      GASTROINTESTINAL DISEASE No family hx of      Musculoskeletal Disorder No family hx of      Neurologic Disorder No family hx of      Respiratory No family hx of      Thyroid Disease No family hx of      Alcohol/Drug No family hx of      Allergies No family hx of      Anesthesia Reaction No family hx of      Cardiovascular No  family hx of      Congenital Anomalies No family hx of      Connective Tissue Disorder No family hx of      Depression No family hx of      Endocrine Disease No family hx of      Gynecology No family hx of      Obesity No family hx of      Osteoperosis No family hx of      Psychotic Disorder No family hx of      Hearing Loss No family hx of        Social History     Social History     Marital status:      Spouse name: Maame     Number of children: 2     Years of education: N/A     Occupational History     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 Yes     Mother     Social History Narrative       Current Outpatient Prescriptions   Medication Sig Dispense Refill     albuterol (ALBUTEROL) 108 (90 BASE) MCG/ACT Inhaler Inhale 2 puffs into the lungs every 6 hours 18 g 3     ASPIRIN 325 MG OR TBEC 1 tab po QD (Once per day) 0 3     blood glucose monitoring (NO BRAND SPECIFIED) test strip Use to test blood sugar 2 times daily or as directed.  ACCU-CHEK JUAN 100 strip 11     blood glucose monitoring (NO BRAND SPECIFIED) test strip 1 strip by In Vitro route 2 times daily Test as directed 3 Box 12     clopidogrel (PLAVIX) 75 MG tablet TAKE  ONE TABLET BY MOUTH EVERY DAY 90 tablet 3     glyBURIDE (DIABETA /MICRONASE) 5 MG tablet TAKE TWO TABLETS BY MOUTH TWICE DAILY WITH MEALS 120 tablet 4     ibuprofen (ADVIL,MOTRIN) 200 MG tablet Take 1,000 mg by mouth nightly as needed.       lisinopril (PRINIVIL,ZESTRIL) 20 MG tablet Take 1 tablet (20 mg) by mouth daily 90 tablet 1     metFORMIN (GLUCOPHAGE) 500 MG tablet TAKE TWO TABLETS BY MOUTH TWICE DAILY WITH MEALS  120 tablet 4     metoprolol (LOPRESSOR) 50 MG tablet Take 1 tablet (50 mg) by mouth 2 times daily 180 tablet 3     ORDER FOR DME Glucose test strips  Use 1-2 x daily 1 packet 5     pioglitazone (ACTOS) 45 MG tablet TAKE ONE TABLET BY MOUTH ONCE DAILY 90 tablet 1     PYCNOGENOL 50 MG PO CAPS taking OPC3 2 caps per day 120 Cap prn     rosuvastatin (CRESTOR) 40 MG tablet Take 1 tablet (40 mg) by mouth daily 90 tablet 3       Allergies   Allergen Reactions     Ace Inhibitors Cough     Contrast Dye Other (See Comments)     Patient felt like ants were crawling all over him/ per patient's explaination 12-15-16/tw       REVIEW OF SYSTEMS:  CONSTITUTIONAL:  NEGATIVE for fever, chills, change in weight, not feeling tired  SKIN:  NEGATIVE for worrisome rashes, no skin lumps, no skin ulcers and no non-healing wounds  EYES:  NEGATIVE for vision changes or irritation.  ENT/MOUTH:  NEGATIVE.  No hearing loss, no hoarseness, no difficulty swallowing.  RESP:  NEGATIVE. No cough or shortness of breath.  CV:  NEGATIVE for chest pain, palpitations or peripheral edema  GI:  NEGATIVE for nausea, abdominal pain, heartburn, or change in bowel habits  :  Negative. No dysuria, no hematuria  MUSCULOSKELETAL:  See HPI above  NEURO:  NEGATIVE . No headaches, no dizziness,  no numbness  ENDOCRINE:  NEGATIVE for temperature intolerance, skin/hair changes  HEME/ALLERGY/IMMUNE:  NEGATIVE for bleeding problems  PSYCHIATRIC:  NEGATIVE. no anxiety, no depression.     Exam:  Vitals: /81  Temp 97.1  F (36.2  C)  Resp 18  Ht 1.829 m (6')  Wt 111.6 kg (246 lb)  BMI 33.36 kg/m2  BMI= Body mass index is 33.36 kg/(m^2).  Constitutional:  healthy, alert and no distress  Neuro: Alert and Oriented x 3, Gait normal. Sensation grossly WNL.  Psych: Affect normal   Respiratory: Breathing not labored.  Cardiovascular: normal peripheral pulses  Lymph: no adenopathy  Skin: No rashes,worrisome lesions or skin problems      The patient's right shoulder was  prepped with betadine solution after verification of allergies. Area approximately 10 cm x 10 cm prepped in a sterile fashion. After injection, betadine removed with soap and water and band-aids applied.    1ml depo-medrol with 1% lidocaine plain injected into patient's right shoulder by Dr. Mac Smalls  LOT# [id number]  Exp. 12/2018    JU ChampagneC  Supervising physician: Mac Smalls MD  Dept. of Orthopedics  [facility]          Again, thank you for allowing me to participate in the care of your patient.        Sincerely,        Mac Smalls MD

## 2018-09-20 DIAGNOSIS — E11.9 TYPE 2 DIABETES MELLITUS WITHOUT COMPLICATION, WITHOUT LONG-TERM CURRENT USE OF INSULIN (H): ICD-10-CM

## 2018-09-20 RX ORDER — GLYBURIDE 5 MG/1
TABLET ORAL
Qty: 120 TABLET | Refills: 0 | Status: SHIPPED | OUTPATIENT
Start: 2018-09-20 | End: 2018-10-17

## 2018-09-20 NOTE — LETTER
September 21, 2018      Genaro Barbosa  [address] [facility] [address]        Dear Genaro,     We sent refills on your       Sincerely,        Mayelin Hernandez MD, MD

## 2018-09-20 NOTE — TELEPHONE ENCOUNTER
Glyburide and metformin    Medication is being filled for 1 time refill only due to:  Patient needs to be seen because 6 month DM F/U.     Please help schedule    Mandy Bui RN, BSN

## 2018-09-20 NOTE — TELEPHONE ENCOUNTER
LM for patient to return phone call to clinic about message below.  Katie Santos CMA ([facility])

## 2018-10-17 DIAGNOSIS — E11.9 TYPE 2 DIABETES MELLITUS WITHOUT COMPLICATION, WITHOUT LONG-TERM CURRENT USE OF INSULIN (H): ICD-10-CM

## 2018-10-17 RX ORDER — GLYBURIDE 5 MG/1
TABLET ORAL
Qty: 120 TABLET | Refills: 0 | Status: SHIPPED | OUTPATIENT
Start: 2018-10-17 | End: 2018-11-14

## 2018-10-17 NOTE — TELEPHONE ENCOUNTER
Notified patient, offered to help schedule patient declined and stated he needed to check his schedule and would call back.

## 2018-10-17 NOTE — TELEPHONE ENCOUNTER
"Requested Prescriptions   Pending Prescriptions Disp Refills     metFORMIN (GLUCOPHAGE) 500 MG tablet [Pharmacy Med Name: METFORMIN  MG TABLET] 120 tablet 0     Sig: TAKE TWO TABLETS BY MOUTH TWICE DAILY WITH MEALS    Biguanide Agents Failed    10/17/2018  2:01 AM       Failed - Blood pressure less than 140/90 in past 6 months    BP Readings from Last 3 Encounters:   06/21/18 172/81   04/11/18 126/64   10/18/17 124/64          Failed - Patient has documented LDL within the past 12 mos.    Recent Labs   Lab Test  05/02/17   0832   LDL  44          Failed - Patient has documented A1c within the specified period of time.    If HgbA1C is 8 or greater, it needs to be on file within the past 3 months.  If less than 8, must be on file within the past 6 months.     Recent Labs   Lab Test  04/11/18   0928   A1C  6.8*          Failed - Recent (6 mo) or future (30 days) visit within the authorizing provider's specialty    Patient had office visit in the last 6 months or has a visit in the next 30 days with authorizing provider or within the authorizing provider's specialty.  See \"Patient Info\" tab in inbasket, or \"Choose Columns\" in Meds & Orders section of the refill encounter.           Passed - Patient has had a Microalbumin in the past 15 mos.    Recent Labs   Lab Test  04/11/18   1018   MICROL  54   UMALCR  38.84*          Passed - Patient is age 10 or older       Passed - Patient's CR is NOT>1.4 OR Patient's EGFR is NOT<45 within past 12 mos.    Recent Labs   Lab Test  10/18/17   0956   GFRESTIMATED  75   GFRESTBLACK  >90     Recent Labs   Lab Test  10/18/17   0956   CR  0.98          Passed - Patient does NOT have a diagnosis of CHF.        glyBURIDE (DIABETA /MICRONASE) 5 MG tablet [Pharmacy Med Name: GLYBURIDE 5 MG TABLET] 120 tablet 0     Sig: TAKE TWO TABLETS BY MOUTH TWICE DAILY WITH MEALS    Sulfonylurea Agents Failed    10/17/2018  2:01 AM       Failed - Blood pressure less than 140/90 in past 6 months    " "BP Readings from Last 3 Encounters:   06/21/18 172/81   04/11/18 126/64   10/18/17 124/64          Failed - Patient has documented LDL within the past 12 mos.    Recent Labs   Lab Test  05/02/17   0832   LDL  44          Failed - Patient has documented A1c within the specified period of time.    If HgbA1C is 8 or greater, it needs to be on file within the past 3 months.  If less than 8, must be on file within the past 6 months.     Recent Labs   Lab Test  04/11/18   0928   A1C  6.8*          Failed - Recent (6 mo) or future (30 days) visit within the authorizing provider's specialty    Patient had office visit in the last 6 months or has a visit in the next 30 days with authorizing provider or within the authorizing provider's specialty.  See \"Patient Info\" tab in inbasket, or \"Choose Columns\" in Meds & Orders section of the refill encounter.           Passed - Patient has had a Microalbumin in the past 12 mos.    Recent Labs   Lab Test  04/11/18   1018   MICROL  54   UMALCR  38.84*          Passed - Patient is age 18 or older       Passed - Patient has a recent creatinine (normal) within the past 12 mos.    Recent Labs   Lab Test  10/18/17   0956   CR  0.98         Last OV 04/11/2018  Last filled 09/20/2018    Routing refill request to provider for review/approval because:  LABS, BP    Hakan Carvalho, RN, BSN                    "

## 2018-11-02 ENCOUNTER — MYC MEDICAL ADVICE (OUTPATIENT)
Dept: FAMILY MEDICINE | Facility: OTHER | Age: 68
End: 2018-11-02

## 2018-11-14 ENCOUNTER — OFFICE VISIT (OUTPATIENT)
Dept: FAMILY MEDICINE | Facility: OTHER | Age: 68
End: 2018-11-14
Payer: COMMERCIAL

## 2018-11-14 VITALS
TEMPERATURE: 96.7 F | RESPIRATION RATE: 16 BRPM | DIASTOLIC BLOOD PRESSURE: 72 MMHG | BODY MASS INDEX: 35.13 KG/M2 | HEART RATE: 63 BPM | WEIGHT: 259 LBS | SYSTOLIC BLOOD PRESSURE: 136 MMHG | OXYGEN SATURATION: 97 %

## 2018-11-14 DIAGNOSIS — E66.01 MORBID OBESITY (H): ICD-10-CM

## 2018-11-14 DIAGNOSIS — I25.10 CORONARY ARTERY DISEASE INVOLVING NATIVE HEART WITHOUT ANGINA PECTORIS, UNSPECIFIED VESSEL OR LESION TYPE: ICD-10-CM

## 2018-11-14 DIAGNOSIS — E11.59 TYPE 2 DIABETES MELLITUS WITH OTHER CIRCULATORY COMPLICATION, WITHOUT LONG-TERM CURRENT USE OF INSULIN (H): Primary | ICD-10-CM

## 2018-11-14 DIAGNOSIS — E11.9 TYPE 2 DIABETES MELLITUS WITHOUT COMPLICATION, WITHOUT LONG-TERM CURRENT USE OF INSULIN (H): ICD-10-CM

## 2018-11-14 DIAGNOSIS — E78.5 HYPERLIPIDEMIA LDL GOAL <70: ICD-10-CM

## 2018-11-14 DIAGNOSIS — I10 HTN, GOAL BELOW 130/80: ICD-10-CM

## 2018-11-14 LAB
ALBUMIN SERPL-MCNC: 3.7 G/DL (ref 3.4–5)
ALP SERPL-CCNC: 56 U/L (ref 40–150)
ALT SERPL W P-5'-P-CCNC: 28 U/L (ref 0–70)
ANION GAP SERPL CALCULATED.3IONS-SCNC: 11 MMOL/L (ref 3–14)
AST SERPL W P-5'-P-CCNC: 21 U/L (ref 0–45)
BILIRUB SERPL-MCNC: 0.8 MG/DL (ref 0.2–1.3)
BUN SERPL-MCNC: 20 MG/DL (ref 7–30)
CALCIUM SERPL-MCNC: 8.8 MG/DL (ref 8.5–10.1)
CHLORIDE SERPL-SCNC: 107 MMOL/L (ref 94–109)
CHOLEST SERPL-MCNC: 144 MG/DL
CO2 SERPL-SCNC: 22 MMOL/L (ref 20–32)
CREAT SERPL-MCNC: 1.1 MG/DL (ref 0.66–1.25)
GFR SERPL CREATININE-BSD FRML MDRD: 66 ML/MIN/1.7M2
GLUCOSE SERPL-MCNC: 154 MG/DL (ref 70–99)
HBA1C MFR BLD: 7 % (ref 0–5.6)
HDLC SERPL-MCNC: 47 MG/DL
LDLC SERPL CALC-MCNC: 77 MG/DL
NONHDLC SERPL-MCNC: 97 MG/DL
POTASSIUM SERPL-SCNC: 4.7 MMOL/L (ref 3.4–5.3)
PROT SERPL-MCNC: 7.2 G/DL (ref 6.8–8.8)
SODIUM SERPL-SCNC: 140 MMOL/L (ref 133–144)
TRIGL SERPL-MCNC: 102 MG/DL

## 2018-11-14 PROCEDURE — 36415 COLL VENOUS BLD VENIPUNCTURE: CPT | Performed by: FAMILY MEDICINE

## 2018-11-14 PROCEDURE — 80061 LIPID PANEL: CPT | Performed by: FAMILY MEDICINE

## 2018-11-14 PROCEDURE — 99214 OFFICE O/P EST MOD 30 MIN: CPT | Performed by: FAMILY MEDICINE

## 2018-11-14 PROCEDURE — 83036 HEMOGLOBIN GLYCOSYLATED A1C: CPT | Performed by: FAMILY MEDICINE

## 2018-11-14 PROCEDURE — 80053 COMPREHEN METABOLIC PANEL: CPT | Performed by: FAMILY MEDICINE

## 2018-11-14 RX ORDER — PIOGLITAZONEHYDROCHLORIDE 15 MG/1
45 TABLET ORAL DAILY
Qty: 270 TABLET | Refills: 1 | Status: SHIPPED | OUTPATIENT
Start: 2018-11-14 | End: 2018-11-27

## 2018-11-14 RX ORDER — GLYBURIDE 5 MG/1
TABLET ORAL
Qty: 360 TABLET | Refills: 0 | Status: SHIPPED | OUTPATIENT
Start: 2018-11-14 | End: 2019-02-20

## 2018-11-14 NOTE — PATIENT INSTRUCTIONS
When your lab results are complete, they are available on your MyChart, and you can print them from there. If your cardiologist in with [facility], he should have access to our labs through Care Everywhere.       If you are going to cut your carbs to 20 per day, stop taking the Actos. Continue to monitor your blood sugars daily. Your A1c is slightly higher than your last recheck, and should continue to go down as you work to lose weight. If you have some days where you eat more than 20 carbs, take the lower dose of Actos. Add the lower dose Actos back in if your blood sugars start creeping up.   Also consider meeting with the diabetic educator to discuss your blood sugars, diet, medications.     You are due to schedule a diabetic eye exam. Follow up in 3 months for recheck.     Hemoglobin A1C   Date Value Ref Range Status   11/14/2018 7.0 (H) 0 - 5.6 % Final     Comment:     Normal <5.7% Prediabetes 5.7-6.4%  Diabetes 6.5% or higher - adopted from ADA   consensus guidelines.     04/11/2018 6.8 (H) 0 - 6.4 % Final     Comment:     Normal <5.7% Prediabetes 5.7-6.4%  Diabetes 6.5% or higher - adopted from ADA   consensus guidelines.     10/18/2017 7.2 (H) 4.3 - 6.0 % Final

## 2018-11-14 NOTE — MR AVS SNAPSHOT
After Visit Summary   11/14/2018    Genaro Barbosa    MRN: [id number]           Patient Information     Date Of Birth          1950        Visit Information        Provider Department      11/14/2018 9:00 AM Mayelin Hernandez MD [facility]        Today's Diagnoses     Type 2 diabetes mellitus with other circulatory complication, without long-term current use of insulin (H)    -  1    Coronary artery disease involving native heart without angina pectoris, unspecified vessel or lesion type        Hyperlipidemia LDL goal <70        HTN, goal below 130/80        Type 2 diabetes mellitus without complication, without long-term current use of insulin (H)        Morbid obesity (H)          Care Instructions    When your lab results are complete, they are available on your MyChart, and you can print them from there. If your cardiologist in with [facility], he should have access to our labs through Care Everywhere.       If you are going to cut your carbs to 20 per day, stop taking the Actos. Continue to monitor your blood sugars daily. Your A1c is slightly higher than your last recheck, and should continue to go down as you work to lose weight. If you have some days where you eat more than 20 carbs, take the lower dose of Actos. Add the lower dose Actos back in if your blood sugars start creeping up.   Also consider meeting with the diabetic educator to discuss your blood sugars, diet, medications.     You are due to schedule a diabetic eye exam. Follow up in 3 months for recheck.     Hemoglobin A1C   Date Value Ref Range Status   11/14/2018 7.0 (H) 0 - 5.6 % Final     Comment:     Normal <5.7% Prediabetes 5.7-6.4%  Diabetes 6.5% or higher - adopted from ADA   consensus guidelines.     04/11/2018 6.8 (H) 0 - 6.4 % Final     Comment:     Normal <5.7% Prediabetes 5.7-6.4%  Diabetes 6.5% or higher - adopted from ADA   consensus guidelines.     10/18/2017 7.2 (H) 4.3 - 6.0 % Final                Follow-ups after your visit        Follow-up notes from your care team     Return in about 3 months (around 2/14/2019) for Diabetes Check.      Who to contact     If you have questions or need follow up information about today's clinic visit or your schedule please contact [facility] [facility] directly at [phone].  Normal or non-critical lab and imaging results will be communicated to you by MyChart, letter or phone within 4 business days after the clinic has received the results. If you do not hear from us within 7 days, please contact the clinic through [employer] or phone. If you have a critical or abnormal lab result, we will notify you by phone as soon as possible.  Submit refill requests through [employer] or call your pharmacy and they will forward the refill request to us. Please allow 3 business days for your refill to be completed.          Additional Information About Your Visit        MyChart Information     [employer] gives you secure access to your electronic health record. If you see a primary care provider, you can also send messages to your care team and make appointments. If you have questions, please call your primary care clinic.  If you do not have a primary care provider, please call [phone] and they will assist you.        Care EveryWhere ID     This is your Care EveryWhere ID. This could be used by other organizations to access your [facility] medical records  [id number]        Your Vitals Were     Pulse Temperature Respirations Pulse Oximetry BMI (Body Mass Index)       63 96.7  F (35.9  C) (Temporal) 16 97% 35.13 kg/m2        Blood Pressure from Last 3 Encounters:   11/14/18 152/78   06/21/18 172/81   04/11/18 126/64    Weight from Last 3 Encounters:   11/14/18 259 lb (117.5 kg)   06/21/18 246 lb (111.6 kg)   04/11/18 252 lb (114.3 kg)              We Performed the Following     Comprehensive metabolic panel     Hemoglobin A1c     Lipid panel reflex to direct LDL Fasting           Today's Medication Changes          These changes are accurate as of 11/14/18  9:43 AM.  If you have any questions, ask your nurse or doctor.               These medicines have changed or have updated prescriptions.        Dose/Directions    pioglitazone 15 MG tablet   Commonly known as:  ACTOS   This may have changed:    - medication strength  - See the new instructions.   Used for:  Type 2 diabetes mellitus without complication, without long-term current use of insulin (H)   Changed by:  Mayelin Hernandez MD        Dose:  45 mg   Take 3 tablets (45 mg) by mouth daily   Quantity:  270 tablet   Refills:  1            Where to get your medicines      These medications were sent to [facility] [address] [facility] - [place], [place] - [address] [address] [place] [address]  [address] [facility], [facility] [address]     Phone:  [phone]     glyBURIDE 5 MG tablet    metFORMIN 500 MG tablet    pioglitazone 15 MG tablet                Primary Care Provider Office Phone # Fax #    Mayelin Hernandez [phone]       [address] [place] [address]        Equal Access to Services     [facility]: Hadii aad ku hadasho Soomaali, waaxda luqadaha, qaybta kaalmada adeegyada, waxay idiin hayaan aniya villalobos . So [facility] [phone].    ATENCIÓN: Si habla español, tiene a briceno disposición servicios gratuitos de asistencia lingüística. [facility] [phone].    We comply with applicable federal civil rights laws and [place] laws. We do not discriminate on the basis of race, color, national origin, age, disability, sex, sexual orientation, or gender identity.            Thank you!     Thank you for choosing [facility]  for your care. Our goal is always to provide you with excellent care. Hearing back from our patients is one way we can continue to improve our services. Please take a few minutes to complete the written survey that you may receive in the mail after your visit with us. Thank you!             Your Updated Medication  List - Protect others around you: Learn how to safely use, store and throw away your medicines at www.disposemymeds.org.          This list is accurate as of 11/14/18  9:43 AM.  Always use your most recent med list.                   Brand Name Dispense Instructions for use Diagnosis    albuterol 108 (90 Base) MCG/ACT inhaler    PROAIR HFA    18 g    Inhale 2 puffs into the lungs every 6 hours    Acute bronchospasm       aspirin 325 MG EC tablet     0    1 tab po QD (Once per day)        blood glucose monitoring test strip    no brand specified    100 strip    Use to test blood sugar 2 times daily or as directed.  ACCU-CHEK JUAN    Type 2 diabetes, HbA1c goal < 7% (H), Type 2 diabetes mellitus with circulatory disorder, without long-term current use of insulin (H)       clopidogrel 75 MG tablet    PLAVIX    90 tablet    TAKE  ONE TABLET BY MOUTH EVERY DAY    CAD (coronary artery disease), Chronic ischemic heart disease, unspecified       CRESTOR 40 MG tablet   Generic drug:  rosuvastatin     90 tablet    Take 1 tablet (40 mg) by mouth daily    CAD (coronary artery disease), Hyperlipidemia LDL goal <70       glyBURIDE 5 MG tablet    DIABETA /MICRONASE    360 tablet    TAKE TWO TABLETS BY MOUTH TWICE DAILY WITH MEALS    Type 2 diabetes mellitus without complication, without long-term current use of insulin (H)       ibuprofen 200 MG tablet    ADVIL/MOTRIN     Take 1,000 mg by mouth nightly as needed.        lisinopril 20 MG tablet    PRINIVIL/ZESTRIL    90 tablet    Take 1 tablet (20 mg) by mouth daily    Type 2 diabetes mellitus with other circulatory complications (H), HTN, goal below 130/80, Coronary artery disease involving native heart without angina pectoris, unspecified vessel or lesion type       metFORMIN 500 MG tablet    GLUCOPHAGE    360 tablet    TAKE TWO TABLETS BY MOUTH TWICE DAILY WITH MEALS    Type 2 diabetes mellitus without complication, without long-term current use of insulin (H)       metoprolol  tartrate 50 MG tablet    LOPRESSOR    180 tablet    Take 1 tablet (50 mg) by mouth 2 times daily    HTN, goal below 140/90       order for DME     1 packet    Glucose test strips Use 1-2 x daily    Type 2 diabetes, HbA1C goal < 8% (H)       pioglitazone 15 MG tablet    ACTOS    270 tablet    Take 3 tablets (45 mg) by mouth daily    Type 2 diabetes mellitus without complication, without long-term current use of insulin (H)       Pycnogenol 50 MG Caps     120 Cap    taking OPC3 2 caps per day    Chronic ischemic heart disease, unspecified

## 2018-11-15 NOTE — PROGRESS NOTES
Genaro, the rest of your labs look good.  Please let me know if you have any questions.    Mayelin Hernandez MD

## 2018-11-27 ENCOUNTER — MYC MEDICAL ADVICE (OUTPATIENT)
Dept: FAMILY MEDICINE | Facility: OTHER | Age: 68
End: 2018-11-27

## 2018-11-27 DIAGNOSIS — E11.9 TYPE 2 DIABETES MELLITUS WITHOUT COMPLICATION, WITHOUT LONG-TERM CURRENT USE OF INSULIN (H): ICD-10-CM

## 2018-11-27 RX ORDER — PIOGLITAZONEHYDROCHLORIDE 45 MG/1
45 TABLET ORAL DAILY
Qty: 90 TABLET | Refills: 1 | Status: SHIPPED | OUTPATIENT
Start: 2018-11-27 | End: 2019-03-28

## 2018-11-27 NOTE — TELEPHONE ENCOUNTER
OK, but if starting to run lower than 126 on average or any repeat values under 80, I would plan to decrease the actos dosing.  Mayelin Hernandez MD

## 2019-02-20 DIAGNOSIS — E11.9 TYPE 2 DIABETES MELLITUS WITHOUT COMPLICATION, WITHOUT LONG-TERM CURRENT USE OF INSULIN (H): ICD-10-CM

## 2019-02-20 RX ORDER — GLYBURIDE 5 MG/1
TABLET ORAL
Qty: 120 TABLET | Refills: 0 | Status: SHIPPED | OUTPATIENT
Start: 2019-02-20 | End: 2019-03-19

## 2019-02-20 NOTE — LETTER
February 22, 2019      Genaro Barbosa  [address] [facility] [address]        Dear Genaro,     We have attempted to reach you by phone and have been unsuccessful. This letter is to inform you that we received a refill request for your Glyburide and Metformin and it has been refilled for one time only. You are due to be seen for an office visit. Please use [facility] or call the clinic to schedule an appointment before further refills are needed.    Please let us know if you have any further questions or concerns.    Thanks,  [facility] Team

## 2019-02-20 NOTE — TELEPHONE ENCOUNTER
"Requested Prescriptions   Pending Prescriptions Disp Refills     glyBURIDE (DIABETA /MICRONASE) 5 MG tablet [Pharmacy Med Name: GLYBURIDE 5 MG TABLET] 360 tablet 0     Sig: TAKE TWO TABLETS BY MOUTH TWICE DAILY WITH MEALS    Sulfonylurea Agents Passed - 2/20/2019  1:28 AM       Passed - Blood pressure less than 140/90 in past 6 months    BP Readings from Last 3 Encounters:   11/14/18 136/72   06/21/18 172/81   04/11/18 126/64          Passed - Patient has documented LDL within the past 12 mos.    Recent Labs   Lab Test 11/14/18  0902   LDL 77          Passed - Patient has had a Microalbumin in the past 12 mos.    Recent Labs   Lab Test 04/11/18  1018   MICROL 54   UMALCR 38.84*          Passed - Patient has documented A1c within the specified period of time.    If HgbA1C is 8 or greater, it needs to be on file within the past 3 months.  If less than 8, must be on file within the past 6 months.     Recent Labs   Lab Test 11/14/18  0902   A1C 7.0*          Passed - Medication is active on med list       Passed - Patient is age 18 or older       Passed - Patient has a recent creatinine (normal) within the past 12 mos.    Recent Labs   Lab Test 11/14/18  0902   CR 1.10          Passed - Recent (6 mo) or future (30 days) visit within the authorizing provider's specialty    Patient had office visit in the last 6 months or has a visit in the next 30 days with authorizing provider or within the authorizing provider's specialty.  See \"Patient Info\" tab in inbasket, or \"Choose Columns\" in Meds & Orders section of the refill encounter.            metFORMIN (GLUCOPHAGE) 500 MG tablet [Pharmacy Med Name: METFORMIN  MG TABLET] 360 tablet 0     Sig: TAKE TWO TABLETS BY MOUTH TWICE DAILY WITH MEALS    Biguanide Agents Passed - 2/20/2019  1:28 AM       Passed - Blood pressure less than 140/90 in past 6 months    BP Readings from Last 3 Encounters:   11/14/18 136/72   06/21/18 172/81   04/11/18 126/64          Passed - Patient " "has documented LDL within the past 12 mos.    Recent Labs   Lab Test 11/14/18  0902   LDL 77          Passed - Patient has had a Microalbumin in the past 15 mos.    Recent Labs   Lab Test 04/11/18  1018   MICROL 54   UMALCR 38.84*          Passed - Patient is age 10 or older       Passed - Patient has documented A1c within the specified period of time.    If HgbA1C is 8 or greater, it needs to be on file within the past 3 months.  If less than 8, must be on file within the past 6 months.     Recent Labs   Lab Test 11/14/18  0902   A1C 7.0*          Passed - Patient's CR is NOT>1.4 OR Patient's EGFR is NOT<45 within past 12 mos.    Recent Labs   Lab Test 11/14/18  0902   GFRESTIMATED 66   GFRESTBLACK 80     Recent Labs   Lab Test 11/14/18  0902   CR 1.10          Passed - Patient does NOT have a diagnosis of CHF.       Passed - Medication is active on med list       Passed - Recent (6 mo) or future (30 days) visit within the authorizing provider's specialty    Patient had office visit in the last 6 months or has a visit in the next 30 days with authorizing provider or within the authorizing provider's specialty.  See \"Patient Info\" tab in inbasket, or \"Choose Columns\" in Meds & Orders section of the refill encounter.          glyBURIDE (DIABETA /MICRONASE) 5 MG tablet  Medication is being filled for 1 time refill only due to:  Patient needs to be seen because due for 3 month OV for DM.    metFORMIN (GLUCOPHAGE) 500 MG tablet  Medication is being filled for 1 time refill only due to:  Patient needs to be seen because due for 3 month OV for DM.    Please assist with scheduling.    Fide Nino, RN, BSN         "

## 2019-02-20 NOTE — TELEPHONE ENCOUNTER
Left message for patient to call back. Please see message below and help schedule OV.  Renate Jarrell, CMA

## 2019-03-05 ENCOUNTER — MYC MEDICAL ADVICE (OUTPATIENT)
Dept: FAMILY MEDICINE | Facility: OTHER | Age: 69
End: 2019-03-05

## 2019-03-06 NOTE — TELEPHONE ENCOUNTER
Next 5 appointments (look out 90 days)    Mar 28, 2019  1:30 PM CDT  Office Visit with Mayelin Hernandez MD  [facility] [address] [facility] [address]  [facility] [address]  [phone]

## 2019-03-18 ENCOUNTER — TRANSFERRED RECORDS (OUTPATIENT)
Dept: HEALTH INFORMATION MANAGEMENT | Facility: CLINIC | Age: 69
End: 2019-03-18

## 2019-03-19 DIAGNOSIS — E11.9 TYPE 2 DIABETES MELLITUS WITHOUT COMPLICATION, WITHOUT LONG-TERM CURRENT USE OF INSULIN (H): ICD-10-CM

## 2019-03-19 RX ORDER — GLYBURIDE 5 MG/1
TABLET ORAL
Qty: 120 TABLET | Refills: 0 | Status: SHIPPED | OUTPATIENT
Start: 2019-03-19 | End: 2019-03-28

## 2019-03-19 NOTE — TELEPHONE ENCOUNTER
glyburide and metformin    Next 5 appointments (look out 90 days)    Mar 28, 2019  1:30 PM CDT  Office Visit with Mayelin Hernandez MD  [facility] ([facility] [address] [address]  [phone]        Medication is being filled for 1 time refill only due to:  Patient needs to be seen because Dm F/U.     Mandy Bui RN, BSN

## 2019-03-28 ENCOUNTER — OFFICE VISIT (OUTPATIENT)
Dept: FAMILY MEDICINE | Facility: OTHER | Age: 69
End: 2019-03-28
Payer: COMMERCIAL

## 2019-03-28 VITALS
OXYGEN SATURATION: 97 % | RESPIRATION RATE: 16 BRPM | HEIGHT: 73 IN | DIASTOLIC BLOOD PRESSURE: 72 MMHG | HEART RATE: 66 BPM | WEIGHT: 259.4 LBS | BODY MASS INDEX: 34.38 KG/M2 | SYSTOLIC BLOOD PRESSURE: 136 MMHG | TEMPERATURE: 97.3 F

## 2019-03-28 DIAGNOSIS — I25.10 CORONARY ARTERY DISEASE INVOLVING NATIVE HEART WITHOUT ANGINA PECTORIS, UNSPECIFIED VESSEL OR LESION TYPE: ICD-10-CM

## 2019-03-28 DIAGNOSIS — K21.9 GASTROESOPHAGEAL REFLUX DISEASE WITHOUT ESOPHAGITIS: ICD-10-CM

## 2019-03-28 DIAGNOSIS — E11.9 TYPE 2 DIABETES MELLITUS WITHOUT COMPLICATION, WITHOUT LONG-TERM CURRENT USE OF INSULIN (H): ICD-10-CM

## 2019-03-28 DIAGNOSIS — E78.5 HYPERLIPIDEMIA LDL GOAL <70: ICD-10-CM

## 2019-03-28 DIAGNOSIS — E11.59 TYPE 2 DIABETES MELLITUS WITH OTHER CIRCULATORY COMPLICATION, WITHOUT LONG-TERM CURRENT USE OF INSULIN (H): Primary | ICD-10-CM

## 2019-03-28 DIAGNOSIS — E66.01 MORBID OBESITY (H): ICD-10-CM

## 2019-03-28 DIAGNOSIS — I10 HTN, GOAL BELOW 130/80: ICD-10-CM

## 2019-03-28 LAB — HBA1C MFR BLD: 7.7 % (ref 0–5.6)

## 2019-03-28 PROCEDURE — 99214 OFFICE O/P EST MOD 30 MIN: CPT | Performed by: FAMILY MEDICINE

## 2019-03-28 PROCEDURE — 83036 HEMOGLOBIN GLYCOSYLATED A1C: CPT | Performed by: FAMILY MEDICINE

## 2019-03-28 PROCEDURE — 36415 COLL VENOUS BLD VENIPUNCTURE: CPT | Performed by: FAMILY MEDICINE

## 2019-03-28 RX ORDER — PIOGLITAZONEHYDROCHLORIDE 15 MG/1
45 TABLET ORAL DAILY
Qty: 90 TABLET | Refills: 2 | Status: SHIPPED | OUTPATIENT
Start: 2019-03-28 | End: 2019-07-18

## 2019-03-28 RX ORDER — GLYBURIDE 5 MG/1
TABLET ORAL
Qty: 360 TABLET | Refills: 0 | Status: SHIPPED | OUTPATIENT
Start: 2019-03-28 | End: 2019-07-11

## 2019-03-28 ASSESSMENT — PATIENT HEALTH QUESTIONNAIRE - PHQ9
SUM OF ALL RESPONSES TO PHQ QUESTIONS 1-9: 0
SUM OF ALL RESPONSES TO PHQ QUESTIONS 1-9: 0
10. IF YOU CHECKED OFF ANY PROBLEMS, HOW DIFFICULT HAVE THESE PROBLEMS MADE IT FOR YOU TO DO YOUR WORK, TAKE CARE OF THINGS AT HOME, OR GET ALONG WITH OTHER PEOPLE: NOT DIFFICULT AT ALL

## 2019-03-28 ASSESSMENT — MIFFLIN-ST. JEOR: SCORE: 1977.57

## 2019-03-28 ASSESSMENT — ANXIETY QUESTIONNAIRES
7. FEELING AFRAID AS IF SOMETHING AWFUL MIGHT HAPPEN: NOT AT ALL
2. NOT BEING ABLE TO STOP OR CONTROL WORRYING: NOT AT ALL
4. TROUBLE RELAXING: NOT AT ALL
GAD7 TOTAL SCORE: 0
7. FEELING AFRAID AS IF SOMETHING AWFUL MIGHT HAPPEN: NOT AT ALL
1. FEELING NERVOUS, ANXIOUS, OR ON EDGE: NOT AT ALL
6. BECOMING EASILY ANNOYED OR IRRITABLE: NOT AT ALL
GAD7 TOTAL SCORE: 0
GAD7 TOTAL SCORE: 0
3. WORRYING TOO MUCH ABOUT DIFFERENT THINGS: NOT AT ALL
5. BEING SO RESTLESS THAT IT IS HARD TO SIT STILL: NOT AT ALL

## 2019-03-29 ASSESSMENT — PATIENT HEALTH QUESTIONNAIRE - PHQ9: SUM OF ALL RESPONSES TO PHQ QUESTIONS 1-9: 0

## 2019-03-29 ASSESSMENT — ANXIETY QUESTIONNAIRES: GAD7 TOTAL SCORE: 0

## 2019-06-20 ENCOUNTER — TRANSFERRED RECORDS (OUTPATIENT)
Dept: HEALTH INFORMATION MANAGEMENT | Facility: CLINIC | Age: 69
End: 2019-06-20

## 2019-06-24 ENCOUNTER — TRANSFERRED RECORDS (OUTPATIENT)
Dept: HEALTH INFORMATION MANAGEMENT | Facility: CLINIC | Age: 69
End: 2019-06-24

## 2019-06-25 NOTE — PATIENT INSTRUCTIONS
Before Your Surgery    Call your surgeon if there is any change in your health. This includes signs of a cold or flu (such as a sore throat, runny nose, cough, rash or fever).    Do not smoke, drink alcohol or take over the counter medicine (unless your surgeon or primary care doctor tells you to) for the 24 hours before and after surgery.    If you take prescribed drugs: Follow your doctor s orders about which medicines to take and which to stop until after surgery. Hold all of your diabetes medications tomorrow morning.     Eating and drinking prior to surgery: follow the instructions from your surgeon    Take a shower or bath the night before surgery. Use the soap your surgeon gave you to gently clean your skin. If you do not have soap from your surgeon, use your regular soap. Do not shave or scrub the surgery site.  Wear clean pajamas and have clean sheets on your bed.     Follow up with Dr. Hernandez within the next few months for a diabetes check.
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
## 2019-06-26 ENCOUNTER — TRANSFERRED RECORDS (OUTPATIENT)
Dept: HEALTH INFORMATION MANAGEMENT | Facility: CLINIC | Age: 69
End: 2019-06-26

## 2019-06-27 ENCOUNTER — OFFICE VISIT (OUTPATIENT)
Dept: FAMILY MEDICINE | Facility: OTHER | Age: 69
End: 2019-06-27
Payer: COMMERCIAL

## 2019-06-27 VITALS
HEART RATE: 76 BPM | WEIGHT: 255 LBS | BODY MASS INDEX: 33.8 KG/M2 | HEIGHT: 73 IN | SYSTOLIC BLOOD PRESSURE: 132 MMHG | TEMPERATURE: 98 F | RESPIRATION RATE: 16 BRPM | OXYGEN SATURATION: 98 % | DIASTOLIC BLOOD PRESSURE: 80 MMHG

## 2019-06-27 DIAGNOSIS — I25.118 CORONARY ARTERY DISEASE OF NATIVE ARTERY OF NATIVE HEART WITH STABLE ANGINA PECTORIS (H): ICD-10-CM

## 2019-06-27 DIAGNOSIS — I10 HTN, GOAL BELOW 130/80: ICD-10-CM

## 2019-06-27 DIAGNOSIS — E78.5 HYPERLIPIDEMIA LDL GOAL <70: ICD-10-CM

## 2019-06-27 DIAGNOSIS — Z01.818 PREOP GENERAL PHYSICAL EXAM: Primary | ICD-10-CM

## 2019-06-27 DIAGNOSIS — E11.59 TYPE 2 DIABETES MELLITUS WITH OTHER CIRCULATORY COMPLICATION, WITHOUT LONG-TERM CURRENT USE OF INSULIN (H): ICD-10-CM

## 2019-06-27 DIAGNOSIS — E66.01 MORBID OBESITY (H): ICD-10-CM

## 2019-06-27 PROCEDURE — 99215 OFFICE O/P EST HI 40 MIN: CPT | Performed by: PHYSICIAN ASSISTANT

## 2019-06-27 ASSESSMENT — MIFFLIN-ST. JEOR: SCORE: 1957.61

## 2019-06-28 ENCOUNTER — TRANSFERRED RECORDS (OUTPATIENT)
Dept: HEALTH INFORMATION MANAGEMENT | Facility: CLINIC | Age: 69
End: 2019-06-28

## 2019-07-03 LAB
CREAT SERPL-MCNC: 1.79 MG/DL (ref 0.72–1.25)
GFR SERPL CREATININE-BSD FRML MDRD: 38 ML/MIN/1.73M2
GLUCOSE SERPL-MCNC: 129 MG/DL (ref 65–100)
POTASSIUM SERPL-SCNC: 3.4 MMOL/L (ref 3.5–5)

## 2019-07-04 LAB
CREAT SERPL-MCNC: 1.74 MG/DL (ref 0.72–1.25)
GFR SERPL CREATININE-BSD FRML MDRD: 39 ML/MIN/1.73M2
GLUCOSE SERPL-MCNC: 136 MG/DL (ref 65–100)
POTASSIUM SERPL-SCNC: 3.6 MMOL/L (ref 3.5–5)

## 2019-07-05 ENCOUNTER — TELEPHONE (OUTPATIENT)
Dept: ANTICOAGULATION | Facility: OTHER | Age: 69
End: 2019-07-05

## 2019-07-05 NOTE — TELEPHONE ENCOUNTER
Pt was discharged from [facility] yesterday and was advised to have his INR checked today. [facility] was not able to see him today. Per Care Everywhere, pt was given 5 mg of Coumadin on Tues 7/2 (INR was 1.1), and 2.5 mg on Wednesday 7/3 (INR was 1.2). On Thursday he was given 1.25 mg because INR popped up to 1.9. Pt will take 1.25 mg daily Fri, Sat, Sun, and  will check it on Monday.     Note: pt is also on amiodarone, the likely reason why INR increased so much     Marimar Zhang RN

## 2019-07-06 ENCOUNTER — TRANSFERRED RECORDS (OUTPATIENT)
Dept: HEALTH INFORMATION MANAGEMENT | Facility: CLINIC | Age: 69
End: 2019-07-06

## 2019-07-08 ENCOUNTER — DOCUMENTATION ONLY (OUTPATIENT)
Dept: CARE COORDINATION | Facility: CLINIC | Age: 69
End: 2019-07-08

## 2019-07-08 ENCOUNTER — TELEPHONE (OUTPATIENT)
Dept: FAMILY MEDICINE | Facility: OTHER | Age: 69
End: 2019-07-08

## 2019-07-08 NOTE — PROGRESS NOTES
Noted, though all started by cardiology and likely intended to be used together.  Mayelin Hernandez MD

## 2019-07-08 NOTE — TELEPHONE ENCOUNTER
Reason for Call:  Other Lind[facility] was given the ok for the orders    Detailed comments: SHe will fax them over for signature    Phone Number Patient can be reached at: Other phone number:  [phone]    Best Time: anytime    Can we leave a detailed message on this number? N/A    Call taken on 7/8/2019 at 4:49 PM by Jay Cabello
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
## 2019-07-08 NOTE — TELEPHONE ENCOUNTER
Reason for Call:  Other orders    Detailed comments: Lindsay from [facility] calling, needing orders for: skilled nursing 2 times a week for 2 weeks, 1 time a week for 1 week, 2 as needed visits, PT & OT eveal/treatment and a wound order. Please advise. She would like high priority.     Phone Number Patient can be reached at: Other phone number:  [phone]*    Best Time: any     Can we leave a detailed message on this number? YES    Call taken on 7/8/2019 at 3:08 PM by Maria Teresa Hernandez

## 2019-07-08 NOTE — PROGRESS NOTES
[facility] utilizes an encounter to take the place of a direct phone call to your office. Please take a moment to review the below request. Please reply or route message to author of this encounter.  Message will act as a verbal OK of orders requested below. Thank you.    NOAHI    Level 2 med interaction present between coumadin and amiodarone    Duplicate therapy warning present between aspirin and warfarin.

## 2019-07-09 ENCOUNTER — TRANSFERRED RECORDS (OUTPATIENT)
Dept: HEALTH INFORMATION MANAGEMENT | Facility: CLINIC | Age: 69
End: 2019-07-09

## 2019-07-09 LAB
CREAT SERPL-MCNC: 1.42 MG/DL (ref 0.72–1.25)
GFR SERPL CREATININE-BSD FRML MDRD: 49 ML/MIN/1.73M2
GLUCOSE SERPL-MCNC: 418 MG/DL (ref 65–100)
POTASSIUM SERPL-SCNC: 4.2 MMOL/L (ref 3.5–5)

## 2019-07-11 ENCOUNTER — OFFICE VISIT (OUTPATIENT)
Dept: FAMILY MEDICINE | Facility: OTHER | Age: 69
End: 2019-07-11
Payer: COMMERCIAL

## 2019-07-11 VITALS
WEIGHT: 251 LBS | BODY MASS INDEX: 33.27 KG/M2 | OXYGEN SATURATION: 98 % | RESPIRATION RATE: 16 BRPM | SYSTOLIC BLOOD PRESSURE: 124 MMHG | DIASTOLIC BLOOD PRESSURE: 76 MMHG | HEIGHT: 73 IN | HEART RATE: 72 BPM | TEMPERATURE: 97.8 F

## 2019-07-11 DIAGNOSIS — I48.91 ATRIAL FIBRILLATION, UNSPECIFIED TYPE (H): ICD-10-CM

## 2019-07-11 DIAGNOSIS — N17.9 AKI (ACUTE KIDNEY INJURY) (H): Primary | ICD-10-CM

## 2019-07-11 DIAGNOSIS — T81.49XA INFECTED SURGICAL WOUND: ICD-10-CM

## 2019-07-11 DIAGNOSIS — E11.8 TYPE 2 DIABETES MELLITUS WITH COMPLICATION, WITHOUT LONG-TERM CURRENT USE OF INSULIN (H): ICD-10-CM

## 2019-07-11 LAB
ALBUMIN SERPL-MCNC: 3 G/DL (ref 3.4–5)
ALP SERPL-CCNC: 129 U/L (ref 40–150)
ALT SERPL W P-5'-P-CCNC: 55 U/L (ref 0–70)
ANION GAP SERPL CALCULATED.3IONS-SCNC: 7 MMOL/L (ref 3–14)
AST SERPL W P-5'-P-CCNC: 36 U/L (ref 0–45)
BILIRUB SERPL-MCNC: 0.6 MG/DL (ref 0.2–1.3)
BUN SERPL-MCNC: 14 MG/DL (ref 7–30)
CALCIUM SERPL-MCNC: 8.5 MG/DL (ref 8.5–10.1)
CHLORIDE SERPL-SCNC: 98 MMOL/L (ref 94–109)
CO2 SERPL-SCNC: 27 MMOL/L (ref 20–32)
CREAT SERPL-MCNC: 1.13 MG/DL (ref 0.66–1.25)
CREAT UR-MCNC: 85 MG/DL
GFR SERPL CREATININE-BSD FRML MDRD: 65 ML/MIN/{1.73_M2}
GLUCOSE SERPL-MCNC: 312 MG/DL (ref 70–99)
MICROALBUMIN UR-MCNC: 54 MG/L
MICROALBUMIN/CREAT UR: 63.07 MG/G CR (ref 0–17)
POTASSIUM SERPL-SCNC: 4.9 MMOL/L (ref 3.4–5.3)
PROT SERPL-MCNC: 7.1 G/DL (ref 6.8–8.8)
SODIUM SERPL-SCNC: 132 MMOL/L (ref 133–144)

## 2019-07-11 PROCEDURE — 99214 OFFICE O/P EST MOD 30 MIN: CPT | Performed by: FAMILY MEDICINE

## 2019-07-11 PROCEDURE — 36415 COLL VENOUS BLD VENIPUNCTURE: CPT | Performed by: FAMILY MEDICINE

## 2019-07-11 PROCEDURE — 82043 UR ALBUMIN QUANTITATIVE: CPT | Performed by: FAMILY MEDICINE

## 2019-07-11 PROCEDURE — 80053 COMPREHEN METABOLIC PANEL: CPT | Performed by: FAMILY MEDICINE

## 2019-07-11 RX ORDER — ASPIRIN 81 MG/1
81 TABLET ORAL DAILY
Status: ON HOLD | COMMUNITY
Start: 2019-07-04 | End: 2023-11-06

## 2019-07-11 RX ORDER — AMIODARONE HYDROCHLORIDE 200 MG/1
200 TABLET ORAL DAILY
COMMUNITY
Start: 2019-07-04 | End: 2023-08-22

## 2019-07-11 RX ORDER — MUPIROCIN CALCIUM 20 MG/G
CREAM TOPICAL 3 TIMES DAILY
Qty: 30 G | Refills: 0 | Status: SHIPPED | OUTPATIENT
Start: 2019-07-11 | End: 2019-10-30

## 2019-07-11 RX ORDER — WARFARIN SODIUM 2.5 MG/1
1.25 TABLET ORAL DAILY
COMMUNITY
Start: 2019-07-04 | End: 2020-02-19

## 2019-07-11 RX ORDER — TAMSULOSIN HYDROCHLORIDE 0.4 MG/1
0.4 CAPSULE ORAL DAILY
COMMUNITY
Start: 2019-07-04 | End: 2019-09-11

## 2019-07-11 RX ORDER — NITROGLYCERIN 80 MG/1
PATCH TRANSDERMAL PRN
Refills: 0 | Status: ON HOLD | COMMUNITY
Start: 2019-06-21 | End: 2023-11-06

## 2019-07-11 RX ORDER — GLIPIZIDE 10 MG/1
10 TABLET ORAL
Qty: 60 TABLET | Refills: 0 | Status: SHIPPED | OUTPATIENT
Start: 2019-07-11 | End: 2019-08-07

## 2019-07-11 RX ORDER — TRAMADOL HYDROCHLORIDE 50 MG/1
50 TABLET ORAL PRN
COMMUNITY
Start: 2019-07-04 | End: 2022-07-18

## 2019-07-11 RX ORDER — GLIPIZIDE 5 MG/1
5 TABLET ORAL 2 TIMES DAILY
COMMUNITY
Start: 2019-07-04 | End: 2019-07-11

## 2019-07-11 ASSESSMENT — ENCOUNTER SYMPTOMS
HEMATOCHEZIA: 0
DIZZINESS: 0
GASTROINTESTINAL NEGATIVE: 1
CARDIOVASCULAR NEGATIVE: 1
NAUSEA: 0
ARTHRALGIAS: 0
HEADACHES: 0
DYSURIA: 0
CONSTITUTIONAL NEGATIVE: 1
FREQUENCY: 0
SORE THROAT: 0
COUGH: 0
HEMATOLOGIC/LYMPHATIC NEGATIVE: 1
CONSTIPATION: 0
MYALGIAS: 0
ALLERGIC/IMMUNOLOGIC NEGATIVE: 1
PSYCHIATRIC NEGATIVE: 1
JOINT SWELLING: 0
EYES NEGATIVE: 1
DIARRHEA: 0
RESPIRATORY NEGATIVE: 1
FEVER: 0
PARESTHESIAS: 0
ABDOMINAL PAIN: 0
PALPITATIONS: 0
SHORTNESS OF BREATH: 0
HEARTBURN: 0
WEAKNESS: 1
MUSCULOSKELETAL NEGATIVE: 1
ENDOCRINE NEGATIVE: 1
CHILLS: 0
NERVOUS/ANXIOUS: 0
HEMATURIA: 0
EYE PAIN: 0

## 2019-07-11 ASSESSMENT — ACTIVITIES OF DAILY LIVING (ADL): CURRENT_FUNCTION: NO ASSISTANCE NEEDED

## 2019-07-11 ASSESSMENT — PAIN SCALES - GENERAL: PAINLEVEL: NO PAIN (0)

## 2019-07-11 ASSESSMENT — MIFFLIN-ST. JEOR: SCORE: 1939.47

## 2019-07-12 ENCOUNTER — DOCUMENTATION ONLY (OUTPATIENT)
Dept: CARE COORDINATION | Facility: CLINIC | Age: 69
End: 2019-07-12

## 2019-07-12 ENCOUNTER — TELEPHONE (OUTPATIENT)
Dept: FAMILY MEDICINE | Facility: OTHER | Age: 69
End: 2019-07-12

## 2019-07-12 NOTE — TELEPHONE ENCOUNTER
Please pass this message on to Genaro when he calls back:          Kidney function is normal. He can resume metformin -500mg twice a day for 3 days and go upto 1000mg twice a day.
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
## 2019-07-12 NOTE — PROGRESS NOTES
[facility] utilizes an encounter to take the place of a direct phone call to your office. Please take a moment to review the below request. Please reply or route message to author of this encounter.  Message will act as a verbal OK of orders requested below. Thank you.    ORDER  Pt seen for home PT eval today 7/12/19, request to continue 1w2 for gait training, there ex/hep instruction beginning wk of 7/14/19.  Hipolito Cuevas PT

## 2019-07-15 ENCOUNTER — TELEPHONE (OUTPATIENT)
Dept: FAMILY MEDICINE | Facility: OTHER | Age: 69
End: 2019-07-15

## 2019-07-15 NOTE — TELEPHONE ENCOUNTER
Reason for Call:  Form, our goal is to have forms completed with 72 hours, however, some forms may require a visit or additional information.    Type of letter, form or note:  medical    Who is the form from?: Home care    Where did the form come from: form was faxed in    What clinic location was the form placed at?: [facility] - [phone]    Where the form was placed:  Box/Folder    What number is listed as a contact on the form?: [phone]       Additional comments: sign fax back    Call taken on 7/15/2019 at 10:22 AM by April Aguilera

## 2019-07-18 ENCOUNTER — OFFICE VISIT (OUTPATIENT)
Dept: FAMILY MEDICINE | Facility: OTHER | Age: 69
End: 2019-07-18
Payer: COMMERCIAL

## 2019-07-18 ENCOUNTER — ANCILLARY PROCEDURE (OUTPATIENT)
Dept: GENERAL RADIOLOGY | Facility: OTHER | Age: 69
End: 2019-07-18
Attending: FAMILY MEDICINE
Payer: COMMERCIAL

## 2019-07-18 VITALS
OXYGEN SATURATION: 98 % | RESPIRATION RATE: 16 BRPM | HEART RATE: 76 BPM | TEMPERATURE: 97.7 F | HEIGHT: 73 IN | DIASTOLIC BLOOD PRESSURE: 78 MMHG | BODY MASS INDEX: 32.2 KG/M2 | SYSTOLIC BLOOD PRESSURE: 120 MMHG | WEIGHT: 243 LBS

## 2019-07-18 DIAGNOSIS — I25.10 CORONARY ARTERY DISEASE INVOLVING NATIVE CORONARY ARTERY OF NATIVE HEART WITHOUT ANGINA PECTORIS: ICD-10-CM

## 2019-07-18 DIAGNOSIS — I25.10 CORONARY ARTERY DISEASE INVOLVING NATIVE HEART WITHOUT ANGINA PECTORIS, UNSPECIFIED VESSEL OR LESION TYPE: ICD-10-CM

## 2019-07-18 DIAGNOSIS — E11.59 TYPE 2 DIABETES MELLITUS WITH OTHER CIRCULATORY COMPLICATIONS (H): ICD-10-CM

## 2019-07-18 DIAGNOSIS — I10 HTN, GOAL BELOW 140/90: ICD-10-CM

## 2019-07-18 DIAGNOSIS — E78.5 HYPERLIPIDEMIA LDL GOAL <70: ICD-10-CM

## 2019-07-18 DIAGNOSIS — I10 HTN, GOAL BELOW 130/80: ICD-10-CM

## 2019-07-18 DIAGNOSIS — R05.9 COUGH: ICD-10-CM

## 2019-07-18 DIAGNOSIS — Z87.891 PERSONAL HISTORY OF TOBACCO USE: Primary | ICD-10-CM

## 2019-07-18 PROCEDURE — 99214 OFFICE O/P EST MOD 30 MIN: CPT | Performed by: FAMILY MEDICINE

## 2019-07-18 PROCEDURE — 71046 X-RAY EXAM CHEST 2 VIEWS: CPT

## 2019-07-18 RX ORDER — LISINOPRIL 2.5 MG/1
2.5 TABLET ORAL DAILY
Qty: 30 TABLET | Refills: 1 | Status: SHIPPED | OUTPATIENT
Start: 2019-07-18 | End: 2019-09-14

## 2019-07-18 RX ORDER — ROSUVASTATIN CALCIUM 40 MG/1
40 TABLET, COATED ORAL DAILY
Qty: 90 TABLET | Refills: 3 | Status: SHIPPED | OUTPATIENT
Start: 2019-07-18 | End: 2020-05-07

## 2019-07-18 RX ORDER — METOPROLOL TARTRATE 50 MG
50 TABLET ORAL 2 TIMES DAILY
Qty: 180 TABLET | Refills: 0 | Status: SHIPPED | OUTPATIENT
Start: 2019-07-18 | End: 2019-10-30

## 2019-07-18 ASSESSMENT — PAIN SCALES - GENERAL: PAINLEVEL: NO PAIN (0)

## 2019-07-18 ASSESSMENT — MIFFLIN-ST. JEOR: SCORE: 1903.18

## 2019-07-18 NOTE — PROGRESS NOTES
Lung Cancer Screening Shared Decision Making Visit     Genaro Barbosa is not eligible for lung cancer screening on the basis of the information provided in my signed lung cancer screening order. Genaro's smoking history is below the threshold and so it is not recommended.    Stopped smoking at the age of 26.

## 2019-07-19 ENCOUNTER — MEDICAL CORRESPONDENCE (OUTPATIENT)
Dept: HEALTH INFORMATION MANAGEMENT | Facility: CLINIC | Age: 69
End: 2019-07-19

## 2019-07-19 ENCOUNTER — TELEPHONE (OUTPATIENT)
Dept: FAMILY MEDICINE | Facility: OTHER | Age: 69
End: 2019-07-19

## 2019-07-19 NOTE — TELEPHONE ENCOUNTER
Reason for Call:  Other call back    Detailed comments: Patient calling stating he would like the results of his chest X ray, states he thinks he might have Pneumonia setting in. Please advise.    Phone Number Patient can be reached at: Cell number on file:    Telephone Information:   Mobile [phone]       Best Time: Any    Can we leave a detailed message on this number? YES    Call taken on 7/19/2019 at 10:42 AM by Agustín Baldwin

## 2019-07-19 NOTE — TELEPHONE ENCOUNTER
Reason for Call:  Form, our goal is to have forms completed with 72 hours, however, some forms may require a visit or additional information.    Type of letter, form or note:  medical    Who is the form from?: Home care    Where did the form come from: form was faxed in    What clinic location was the form placed at?: [facility] - [phone]    Where the form was placed:  Box/Folder    What number is listed as a contact on the form?: [phone]       Additional comments: sign fax back    Call taken on 7/19/2019 at 8:33 AM by April Aguilera

## 2019-07-20 DIAGNOSIS — E11.9 TYPE 2 DIABETES MELLITUS WITHOUT COMPLICATION, WITHOUT LONG-TERM CURRENT USE OF INSULIN (H): ICD-10-CM

## 2019-07-23 RX ORDER — GLYBURIDE 5 MG/1
TABLET ORAL
Qty: 120 TABLET | Refills: 2 | OUTPATIENT
Start: 2019-07-23

## 2019-07-29 ENCOUNTER — TRANSFERRED RECORDS (OUTPATIENT)
Dept: HEALTH INFORMATION MANAGEMENT | Facility: CLINIC | Age: 69
End: 2019-07-29

## 2019-08-04 DIAGNOSIS — E11.9 TYPE 2 DIABETES MELLITUS WITHOUT COMPLICATION, WITHOUT LONG-TERM CURRENT USE OF INSULIN (H): ICD-10-CM

## 2019-08-06 RX ORDER — GLYBURIDE 5 MG/1
TABLET ORAL
Qty: 120 TABLET | Refills: 2 | OUTPATIENT
Start: 2019-08-06

## 2019-08-07 DIAGNOSIS — E11.8 TYPE 2 DIABETES MELLITUS WITH COMPLICATION, WITHOUT LONG-TERM CURRENT USE OF INSULIN (H): ICD-10-CM

## 2019-08-07 RX ORDER — GLIPIZIDE 10 MG/1
TABLET ORAL
Qty: 60 TABLET | Refills: 0 | Status: SHIPPED | OUTPATIENT
Start: 2019-08-07 | End: 2019-09-30

## 2019-08-07 NOTE — TELEPHONE ENCOUNTER
Prescription approved per [facility].    Hakan Carvalho, RN, BSN

## 2019-08-08 ENCOUNTER — TRANSFERRED RECORDS (OUTPATIENT)
Dept: HEALTH INFORMATION MANAGEMENT | Facility: CLINIC | Age: 69
End: 2019-08-08

## 2019-08-18 DIAGNOSIS — E11.9 TYPE 2 DIABETES MELLITUS WITHOUT COMPLICATION, WITHOUT LONG-TERM CURRENT USE OF INSULIN (H): ICD-10-CM

## 2019-08-19 NOTE — TELEPHONE ENCOUNTER
Pending Prescriptions:                       Disp   Refills    metFORMIN (GLUCOPHAGE) 500 MG tablet [Pha*120 ta*2            Sig: TAKE TWO TABLETS BY MOUTH TWICE DAILY WITH MEALS    Sent 7/22/2019 with 3 month supply. Refill not appropriate at this time.     Mandy Bui, RN, BSN

## 2019-08-26 ENCOUNTER — TRANSFERRED RECORDS (OUTPATIENT)
Dept: HEALTH INFORMATION MANAGEMENT | Facility: CLINIC | Age: 69
End: 2019-08-26

## 2019-09-02 DIAGNOSIS — E11.8 TYPE 2 DIABETES MELLITUS WITH COMPLICATION, WITHOUT LONG-TERM CURRENT USE OF INSULIN (H): ICD-10-CM

## 2019-09-03 ENCOUNTER — MYC REFILL (OUTPATIENT)
Dept: FAMILY MEDICINE | Facility: OTHER | Age: 69
End: 2019-09-03

## 2019-09-03 ENCOUNTER — MYC MEDICAL ADVICE (OUTPATIENT)
Dept: FAMILY MEDICINE | Facility: OTHER | Age: 69
End: 2019-09-03

## 2019-09-03 DIAGNOSIS — E11.9 TYPE 2 DIABETES MELLITUS WITHOUT COMPLICATION, WITHOUT LONG-TERM CURRENT USE OF INSULIN (H): ICD-10-CM

## 2019-09-03 DIAGNOSIS — E11.8 TYPE 2 DIABETES MELLITUS WITH COMPLICATION, WITHOUT LONG-TERM CURRENT USE OF INSULIN (H): ICD-10-CM

## 2019-09-03 RX ORDER — GLIPIZIDE 10 MG/1
10 TABLET ORAL
Qty: 60 TABLET | Refills: 0 | Status: CANCELLED | OUTPATIENT
Start: 2019-09-03

## 2019-09-03 RX ORDER — GLIPIZIDE 10 MG/1
TABLET ORAL
Qty: 60 TABLET | Refills: 0 | OUTPATIENT
Start: 2019-09-03

## 2019-09-03 NOTE — TELEPHONE ENCOUNTER
Meds pended and sent to refill pool.    Pasted from other encounter.  Please forget the request for Crestor.   My heart doctor is refilling it.   rony Muir MA

## 2019-09-03 NOTE — TELEPHONE ENCOUNTER
Glipizide  Routing refill request to provider for review/approval because:  Nikki given x1 and patient did not follow up  Overdue for DM follow up    Alexus Simmons, RN, BSN

## 2019-09-03 NOTE — TELEPHONE ENCOUNTER
Metformin  Sent 7/22/19 with three month supply. Will send to [facility]'s when next due. MyChart sent.     Alexus Simmons, RN, BSN
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
## 2019-09-11 ENCOUNTER — TELEPHONE (OUTPATIENT)
Dept: FAMILY MEDICINE | Facility: OTHER | Age: 69
End: 2019-09-11

## 2019-09-11 ENCOUNTER — OFFICE VISIT (OUTPATIENT)
Dept: FAMILY MEDICINE | Facility: OTHER | Age: 69
End: 2019-09-11
Payer: COMMERCIAL

## 2019-09-11 VITALS
HEART RATE: 70 BPM | SYSTOLIC BLOOD PRESSURE: 118 MMHG | BODY MASS INDEX: 30.28 KG/M2 | OXYGEN SATURATION: 100 % | WEIGHT: 228.5 LBS | TEMPERATURE: 97.6 F | HEIGHT: 73 IN | RESPIRATION RATE: 16 BRPM | DIASTOLIC BLOOD PRESSURE: 70 MMHG

## 2019-09-11 DIAGNOSIS — E11.69 TYPE 2 DIABETES MELLITUS WITH OTHER SPECIFIED COMPLICATION, WITHOUT LONG-TERM CURRENT USE OF INSULIN (H): ICD-10-CM

## 2019-09-11 DIAGNOSIS — R39.11 URINARY HESITANCY: Primary | ICD-10-CM

## 2019-09-11 DIAGNOSIS — I10 HTN, GOAL BELOW 130/80: ICD-10-CM

## 2019-09-11 DIAGNOSIS — I48.91 ATRIAL FIBRILLATION, UNSPECIFIED TYPE (H): ICD-10-CM

## 2019-09-11 DIAGNOSIS — E11.9 TYPE 2 DIABETES, HBA1C GOAL < 7% (H): ICD-10-CM

## 2019-09-11 DIAGNOSIS — E78.5 HYPERLIPIDEMIA LDL GOAL <70: ICD-10-CM

## 2019-09-11 LAB
ANION GAP SERPL CALCULATED.3IONS-SCNC: 7 MMOL/L (ref 3–14)
BUN SERPL-MCNC: 14 MG/DL (ref 7–30)
CALCIUM SERPL-MCNC: 9.3 MG/DL (ref 8.5–10.1)
CHLORIDE SERPL-SCNC: 102 MMOL/L (ref 94–109)
CO2 SERPL-SCNC: 26 MMOL/L (ref 20–32)
CREAT SERPL-MCNC: 1 MG/DL (ref 0.66–1.25)
GFR SERPL CREATININE-BSD FRML MDRD: 75 ML/MIN/{1.73_M2}
GLUCOSE SERPL-MCNC: 240 MG/DL (ref 70–99)
HBA1C MFR BLD: 7.9 % (ref 0–5.6)
POTASSIUM SERPL-SCNC: 5 MMOL/L (ref 3.4–5.3)
SODIUM SERPL-SCNC: 135 MMOL/L (ref 133–144)

## 2019-09-11 PROCEDURE — 99214 OFFICE O/P EST MOD 30 MIN: CPT | Performed by: FAMILY MEDICINE

## 2019-09-11 PROCEDURE — 36415 COLL VENOUS BLD VENIPUNCTURE: CPT | Performed by: FAMILY MEDICINE

## 2019-09-11 PROCEDURE — 83036 HEMOGLOBIN GLYCOSYLATED A1C: CPT | Performed by: FAMILY MEDICINE

## 2019-09-11 PROCEDURE — 80048 BASIC METABOLIC PNL TOTAL CA: CPT | Performed by: FAMILY MEDICINE

## 2019-09-11 RX ORDER — TAMSULOSIN HYDROCHLORIDE 0.4 MG/1
0.4 CAPSULE ORAL DAILY
Qty: 90 CAPSULE | Refills: 0 | Status: SHIPPED | OUTPATIENT
Start: 2019-09-11 | End: 2019-10-30

## 2019-09-11 ASSESSMENT — PAIN SCALES - GENERAL: PAINLEVEL: NO PAIN (0)

## 2019-09-11 ASSESSMENT — MIFFLIN-ST. JEOR: SCORE: 1837.41

## 2019-09-11 NOTE — ASSESSMENT & PLAN NOTE
-Last a1c- 7.7. Recheck A1c today-7.9  -Home fasting Blood sugars- 180's  -Last eye exam->2 yrs ago. [facility]. Pt to schedule  -Diabetic foot exam -no neuropathy  -Last urine microalbumin-elevated. Continue lisinopril.  -Last LDL- continue crestor-40mg   -Continue current medications- metformin and glipizide. Add januvia -25mg   -Refills provided  -Discussed diet , lifestyle modifications, exercise and weight loss  -RTC in 3-6 months for follow up

## 2019-09-11 NOTE — TELEPHONE ENCOUNTER
Attempted to reach the patient with the following information.  Left message for patient to return call to clinic.     Elly Moreno MA

## 2019-09-11 NOTE — ASSESSMENT & PLAN NOTE
Had paroxysmal atrial fibrillation after cardiac surgery -he seems to have reverted to sinus rhythm on the current dose of amiodarone.  Continue Coumadin and amiodarone. Followed by cardiology

## 2019-09-14 DIAGNOSIS — I10 HTN, GOAL BELOW 130/80: ICD-10-CM

## 2019-09-14 DIAGNOSIS — E11.59 TYPE 2 DIABETES MELLITUS WITH OTHER CIRCULATORY COMPLICATIONS (H): ICD-10-CM

## 2019-09-14 DIAGNOSIS — I25.10 CORONARY ARTERY DISEASE INVOLVING NATIVE HEART WITHOUT ANGINA PECTORIS, UNSPECIFIED VESSEL OR LESION TYPE: ICD-10-CM

## 2019-09-16 RX ORDER — LISINOPRIL 2.5 MG/1
TABLET ORAL
Qty: 90 TABLET | Refills: 3 | Status: SHIPPED | OUTPATIENT
Start: 2019-09-16 | End: 2020-05-07

## 2019-09-16 NOTE — TELEPHONE ENCOUNTER
Pending Prescriptions:                       Disp   Refills    lisinopril (PRINIVIL/ZESTRIL) 2.5 MG tabl*30 tab*0            Sig: TAKE 1 TABLET(2.5 MG) BY MOUTH DAILY    Prescription approved per [facility] Refill Protocol.      Monica Murray, RN, BSN

## 2019-09-26 DIAGNOSIS — E11.9 TYPE 2 DIABETES MELLITUS WITHOUT COMPLICATION, WITHOUT LONG-TERM CURRENT USE OF INSULIN (H): ICD-10-CM

## 2019-09-26 NOTE — TELEPHONE ENCOUNTER
Prescription approved per [facility] Refill Protocol.  Hakan Carvalho, RN, BSN

## 2019-09-28 ENCOUNTER — HEALTH MAINTENANCE LETTER (OUTPATIENT)
Age: 69
End: 2019-09-28

## 2019-09-30 DIAGNOSIS — E11.8 TYPE 2 DIABETES MELLITUS WITH COMPLICATION, WITHOUT LONG-TERM CURRENT USE OF INSULIN (H): ICD-10-CM

## 2019-10-01 RX ORDER — GLIPIZIDE 10 MG/1
TABLET ORAL
Qty: 180 TABLET | Refills: 1 | Status: SHIPPED | OUTPATIENT
Start: 2019-10-01 | End: 2019-10-30

## 2019-10-01 NOTE — TELEPHONE ENCOUNTER
Pending Prescriptions:                       Disp   Refills    glipiZIDE (GLUCOTROL) 10 MG tablet [Pharm*60 tab*0            Sig: TAKE 1 TABLET(10 MG) BY MOUTH TWICE DAILY BEFORE           MEALS    Prescription approved per [facility] Refill Protocol.    Mandy Bui, RN, BSN

## 2019-10-23 NOTE — PROGRESS NOTES
Subjective     Genaro Barbosa is a 69 year old male who presents to clinic today for the following health issues:      Patient declined influenza and Wellness Visit.       Diabetes:   He presents for follow up of diabetes.  He is checking home blood glucose a few times a month. He checks blood glucose before meals.  Blood glucose is sometimes over 200 and never under 70. When his blood glucose is low, the patient is asymptomatic for confusion, blurred vision, lethargy and reports not feeling dizzy, shaky, or weak.   He is not experiencing numbness or burning in feet, excessive thirst, blurry vision, weight changes or redness, sores or blisters on feet. The patient has not had a diabetic eye exam in the last 12 months.     Diabetes Management Resources    Hyperlipidemia:  He presents for follow up of hyperlipidemia.  He is not taking medication to lower cholesterol. He is not having myalgia or other side effects to statin medications.He is not reporting shortness of breath, increased sweating or nausea with activity, left-sided neck or arm pain, chest pain or pressure, or pain in calves when walking 1-2 blocks.    Hypertension: He presents for follow up of hypertension.  He does check blood pressure  regularly outside of the clinic. Outpatient blood pressures have not been over 140/90. He follows a low salt diet.      CAD  Had CABG X5, 4 months ago. Has been having lightheadedness symptoms frequently. Was recommended to stop lisinopril due to low blood pressures. Has briceno with cardiology in December. He notes that he has lost 40 lbs since the surgery and had a lack of appetite. He has started drinking diet sodas in place of alcohol at the bar.     Diabetes Mellitus Type 2   Blood glucose in the 400's while in hospital. Used to be 120-140, changed medications and now his blood glucose readings are often in the 150-160 range. Currently on anti-diabetic medications Glipizide and Januvia.     Urinary Hesitancy  Takes  "Flomax, but not all the time. When he doesn't take this, he notes that sometimes he will wake up in the middle of the night and have a hard time getting the urine started.     BP Readings from Last 3 Encounters:   10/30/19 120/80   09/11/19 118/70   07/18/19 120/78    Wt Readings from Last 3 Encounters:   10/30/19 101.2 kg (223 lb)   09/11/19 103.6 kg (228 lb 8 oz)   07/18/19 110.2 kg (243 lb)         Reviewed and updated as needed this visit by Provider  Tobacco  Allergies  Meds  Problems  Med Hx  Surg Hx  Fam Hx       Review of Systems   ROS COMP: Constitutional, HEENT, cardiovascular, pulmonary, GI, , musculoskeletal, neuro, skin, endocrine and psych systems are negative, except as otherwise noted.    This document serves as a record of the services and decisions personally performed and made by Eliezer Ovalles MD. It was created on his behalf by Vincent Tompkins, a trained medical scribe. The creation of this document is based on the provider's statements to the medical scribe.  Vincent Tompkins 10:53 AM October 30, 2019      Objective    /80 (BP Location: Right arm, Patient Position: Chair, Cuff Size: Adult Regular)   Pulse 78   Temp 96.7  F (35.9  C)   Resp 16   Ht 1.842 m (6' 0.5\")   Wt 101.2 kg (223 lb)   SpO2 99%   BMI 29.83 kg/m    Body mass index is 29.83 kg/m .  Physical Exam   GENERAL: healthy, alert and no distress  RESP: lungs clear to auscultation - no rales, rhonchi or wheezes  CV: regular rate and rhythm, normal S1 S2, no S3 or S4, no murmur, click or rub, no peripheral edema and peripheral pulses strong  SKIN: scar on chest from CABG procedure that appears well healed  PSYCH: mentation appears normal, affect normal/bright    Assessment & Plan       ICD-10-CM    1. Coronary artery disease involving native coronary artery of native heart without angina pectoris I25.10    2. Atrial fibrillation, unspecified type (H) I48.91    3. Type 2 diabetes mellitus with other circulatory " complication, without long-term current use of insulin (H) E11.59 TSH WITH FREE T4 REFLEX     Lipid panel reflex to direct LDL Fasting     Hemoglobin A1c     Hemoglobin A1c   4. Hyperlipidemia LDL goal <70 E78.5    5. Gastroesophageal reflux disease without esophagitis K21.9    6. HTN, goal below 140/90 I10 metoprolol tartrate (LOPRESSOR) 50 MG tablet   7. Urinary hesitancy R39.11 tamsulosin (FLOMAX) 0.4 MG capsule   8. Screening for colon cancer Z12.11 Fecal colorectal cancer screen (FIT)   9. Type 2 diabetes mellitus with complication, without long-term current use of insulin (H) E11.8 glipiZIDE (GLUCOTROL) 10 MG tablet   10. Type 2 diabetes mellitus without complication, without long-term current use of insulin (H) E11.9 metFORMIN (GLUCOPHAGE) 500 MG tablet   11. Type 2 diabetes mellitus with other specified complication, without long-term current use of insulin (H) E11.69 sitagliptin (JANUVIA) 50 MG tablet   12. Type 2 diabetes, HbA1c goal < 7% (H) E11.9 sitagliptin (JANUVIA) 50 MG tablet     CABG X5 about 4 months ago and is recovering, continue cardiac rehab. Still having slightly elevated blood glucose. Increase Januvia to 50 mg daily and monitor sugars. Advised to take Flomax daily and monitor blood pressure and lightheadedness symptoms. Patient declines colonoscopy, has been doing fit card testing annually.     Return in about 3 months (around 1/30/2020) for recheck A1C.    The information in this document, created by the medical scribe for me, accurately reflects the services I personally performed and the decisions made by me. I have reviewed and approved this document for accuracy prior to leaving the patient care area.  October 30, 2019 11:22 AM     Mayelin Hernandez MD, MD  [facility]

## 2019-10-30 ENCOUNTER — OFFICE VISIT (OUTPATIENT)
Dept: FAMILY MEDICINE | Facility: OTHER | Age: 69
End: 2019-10-30
Payer: COMMERCIAL

## 2019-10-30 VITALS
TEMPERATURE: 96.7 F | WEIGHT: 223 LBS | DIASTOLIC BLOOD PRESSURE: 80 MMHG | HEIGHT: 73 IN | OXYGEN SATURATION: 99 % | HEART RATE: 78 BPM | BODY MASS INDEX: 29.55 KG/M2 | SYSTOLIC BLOOD PRESSURE: 120 MMHG | RESPIRATION RATE: 16 BRPM

## 2019-10-30 DIAGNOSIS — E78.5 HYPERLIPIDEMIA LDL GOAL <70: ICD-10-CM

## 2019-10-30 DIAGNOSIS — Z12.11 SCREENING FOR COLON CANCER: ICD-10-CM

## 2019-10-30 DIAGNOSIS — K21.9 GASTROESOPHAGEAL REFLUX DISEASE WITHOUT ESOPHAGITIS: ICD-10-CM

## 2019-10-30 DIAGNOSIS — E11.69 TYPE 2 DIABETES MELLITUS WITH OTHER SPECIFIED COMPLICATION, WITHOUT LONG-TERM CURRENT USE OF INSULIN (H): ICD-10-CM

## 2019-10-30 DIAGNOSIS — E11.59 TYPE 2 DIABETES MELLITUS WITH OTHER CIRCULATORY COMPLICATION, WITHOUT LONG-TERM CURRENT USE OF INSULIN (H): ICD-10-CM

## 2019-10-30 DIAGNOSIS — I10 HTN, GOAL BELOW 140/90: ICD-10-CM

## 2019-10-30 DIAGNOSIS — E11.8 TYPE 2 DIABETES MELLITUS WITH COMPLICATION, WITHOUT LONG-TERM CURRENT USE OF INSULIN (H): ICD-10-CM

## 2019-10-30 DIAGNOSIS — E11.9 TYPE 2 DIABETES, HBA1C GOAL < 7% (H): ICD-10-CM

## 2019-10-30 DIAGNOSIS — E11.9 TYPE 2 DIABETES MELLITUS WITHOUT COMPLICATION, WITHOUT LONG-TERM CURRENT USE OF INSULIN (H): ICD-10-CM

## 2019-10-30 DIAGNOSIS — R39.11 URINARY HESITANCY: ICD-10-CM

## 2019-10-30 DIAGNOSIS — I25.10 CORONARY ARTERY DISEASE INVOLVING NATIVE CORONARY ARTERY OF NATIVE HEART WITHOUT ANGINA PECTORIS: Primary | ICD-10-CM

## 2019-10-30 DIAGNOSIS — I48.91 ATRIAL FIBRILLATION, UNSPECIFIED TYPE (H): ICD-10-CM

## 2019-10-30 LAB
CHOLEST SERPL-MCNC: 98 MG/DL
HBA1C MFR BLD: 8 % (ref 0–5.6)
HDLC SERPL-MCNC: 38 MG/DL
LDLC SERPL CALC-MCNC: 34 MG/DL
NONHDLC SERPL-MCNC: 60 MG/DL
TRIGL SERPL-MCNC: 131 MG/DL
TSH SERPL DL<=0.005 MIU/L-ACNC: 2.35 MU/L (ref 0.4–4)

## 2019-10-30 PROCEDURE — 36415 COLL VENOUS BLD VENIPUNCTURE: CPT | Performed by: FAMILY MEDICINE

## 2019-10-30 PROCEDURE — 99214 OFFICE O/P EST MOD 30 MIN: CPT | Performed by: FAMILY MEDICINE

## 2019-10-30 PROCEDURE — 84443 ASSAY THYROID STIM HORMONE: CPT | Performed by: FAMILY MEDICINE

## 2019-10-30 PROCEDURE — 83036 HEMOGLOBIN GLYCOSYLATED A1C: CPT | Performed by: FAMILY MEDICINE

## 2019-10-30 PROCEDURE — 80061 LIPID PANEL: CPT | Performed by: FAMILY MEDICINE

## 2019-10-30 RX ORDER — TAMSULOSIN HYDROCHLORIDE 0.4 MG/1
0.4 CAPSULE ORAL DAILY
Qty: 90 CAPSULE | Refills: 1 | Status: SHIPPED | OUTPATIENT
Start: 2019-10-30 | End: 2020-05-07

## 2019-10-30 RX ORDER — GLIPIZIDE 10 MG/1
10 TABLET ORAL
Qty: 180 TABLET | Refills: 1 | Status: SHIPPED | OUTPATIENT
Start: 2019-10-30 | End: 2020-09-03

## 2019-10-30 RX ORDER — METOPROLOL TARTRATE 50 MG
50 TABLET ORAL 2 TIMES DAILY
Qty: 180 TABLET | Refills: 1 | Status: SHIPPED | OUTPATIENT
Start: 2019-10-30 | End: 2020-05-03

## 2019-10-30 ASSESSMENT — MIFFLIN-ST. JEOR: SCORE: 1812.46

## 2019-10-30 NOTE — PATIENT INSTRUCTIONS
Increase Januvia to 50 mg daily. Keep an eye on the blood glucose readings as you are becoming more active with recovering from the bypass surgery.     Take Flomax regularly and keep in mind that Flomax may cause low blood pressures and therefore lightheadedness symptoms. Take your blood pressure regularly at home to keep an eye on these numbers.

## 2019-10-30 NOTE — RESULT ENCOUNTER NOTE
Genaro, your results were all normal other than the blood sugar which we addressed in clinic.  Please let me know if you have any questions.  Mayelin Hernandez MD

## 2019-11-08 DIAGNOSIS — Z12.11 SCREENING FOR COLON CANCER: ICD-10-CM

## 2019-11-08 PROCEDURE — 82274 ASSAY TEST FOR BLOOD FECAL: CPT | Performed by: FAMILY MEDICINE

## 2019-11-10 LAB — HEMOCCULT STL QL IA: NEGATIVE

## 2019-12-09 ENCOUNTER — TRANSFERRED RECORDS (OUTPATIENT)
Dept: HEALTH INFORMATION MANAGEMENT | Facility: CLINIC | Age: 69
End: 2019-12-09

## 2020-01-02 ENCOUNTER — OFFICE VISIT (OUTPATIENT)
Dept: ORTHOPEDICS | Facility: CLINIC | Age: 70
End: 2020-01-02
Payer: COMMERCIAL

## 2020-01-02 VITALS
BODY MASS INDEX: 29.03 KG/M2 | HEART RATE: 112 BPM | HEIGHT: 73 IN | RESPIRATION RATE: 16 BRPM | DIASTOLIC BLOOD PRESSURE: 79 MMHG | WEIGHT: 219 LBS | SYSTOLIC BLOOD PRESSURE: 130 MMHG

## 2020-01-02 DIAGNOSIS — M75.102 LEFT ROTATOR CUFF TEAR ARTHROPATHY: Primary | ICD-10-CM

## 2020-01-02 DIAGNOSIS — M75.102 ROTATOR CUFF TEAR ARTHROPATHY OF LEFT SHOULDER: ICD-10-CM

## 2020-01-02 DIAGNOSIS — M12.812 ROTATOR CUFF TEAR ARTHROPATHY OF LEFT SHOULDER: ICD-10-CM

## 2020-01-02 DIAGNOSIS — M12.812 LEFT ROTATOR CUFF TEAR ARTHROPATHY: Primary | ICD-10-CM

## 2020-01-02 PROCEDURE — 20610 DRAIN/INJ JOINT/BURSA W/O US: CPT | Mod: LT | Performed by: ORTHOPAEDIC SURGERY

## 2020-01-02 RX ORDER — METHYLPREDNISOLONE ACETATE 80 MG/ML
80 INJECTION, SUSPENSION INTRA-ARTICULAR; INTRALESIONAL; INTRAMUSCULAR; SOFT TISSUE
Status: DISCONTINUED | OUTPATIENT
Start: 2020-01-02 | End: 2023-08-22

## 2020-01-02 RX ORDER — LIDOCAINE HYDROCHLORIDE 10 MG/ML
5 INJECTION, SOLUTION INFILTRATION; PERINEURAL
Status: DISCONTINUED | OUTPATIENT
Start: 2020-01-02 | End: 2023-08-22

## 2020-01-02 RX ADMIN — LIDOCAINE HYDROCHLORIDE 5 ML: 10 INJECTION, SOLUTION INFILTRATION; PERINEURAL at 13:41

## 2020-01-02 RX ADMIN — METHYLPREDNISOLONE ACETATE 80 MG: 80 INJECTION, SUSPENSION INTRA-ARTICULAR; INTRALESIONAL; INTRAMUSCULAR; SOFT TISSUE at 13:41

## 2020-01-02 ASSESSMENT — MIFFLIN-ST. JEOR: SCORE: 1794.32

## 2020-01-02 NOTE — PROGRESS NOTES
Large Joint Injection/Arthocentesis: L subacromial bursa  Date/Time: 1/2/2020 1:41 PM  Performed by: Mac Smalls MD  Authorized by: Mac Smalls MD     Indications:  Pain and osteoarthritis  Needle Size:  22 G  Guidance: landmark guided    Approach:  Lateral  Location:  Shoulder      Site:  L subacromial bursa  Medications:  5 mL lidocaine 1 %; 80 mg methylPREDNISolone 80 MG/ML  Outcome:  Tolerated well, no immediate complications  Procedure discussed: discussed risks, benefits, and alternatives    Consent Given by:  Patient  Timeout: timeout called immediately prior to procedure    Prep: patient was prepped and draped in usual sterile fashion    Prep comment:  Betadine

## 2020-01-02 NOTE — LETTER
1/2/2020         RE: Genaro Barbosa  [phone]  [id number]        Dear Colleague,    Thank you for referring your patient, Genaro Barbosa, to the [facility]. Please see a copy of my visit note below.        Large Joint Injection/Arthocentesis: L subacromial bursa  Date/Time: 1/2/2020 1:41 PM  Performed by: Mac Smalls MD  Authorized by: Mac Smalls MD     Indications:  Pain and osteoarthritis  Needle Size:  22 G  Guidance: landmark guided    Approach:  Lateral  Location:  Shoulder      Site:  L subacromial bursa  Medications:  5 mL lidocaine 1 %; 80 mg methylPREDNISolone 80 MG/ML  Outcome:  Tolerated well, no immediate complications  Procedure discussed: discussed risks, benefits, and alternatives    Consent Given by:  Patient  Timeout: timeout called immediately prior to procedure    Prep: patient was prepped and draped in usual sterile fashion    Prep comment:  Betadine          Follow up left shoulder cuff tear arthropathy .  Patient desires injection today of left shoulder.  Risks, benefits, potential complications and alternatives were discussed.   With the patient's consent, sterile prep was performed of left shoulder.  Left shoulder was injected with Depomedrol 80 mg and lidocaine at shoulder subacromial space from the posterolateral approach .  Return to clinic as needed.        Again, thank you for allowing me to participate in the care of your patient.        Sincerely,        Mac Smalls MD

## 2020-01-08 ENCOUNTER — TELEPHONE (OUTPATIENT)
Dept: FAMILY MEDICINE | Facility: OTHER | Age: 70
End: 2020-01-08

## 2020-01-08 NOTE — TELEPHONE ENCOUNTER
Summary:    Patient is due/failing the following:   Diabetic follow up, Eye exam and A1C    Action needed:   Patient needs office visit for follow up.    Type of outreach:    Phone, spoke to patient.  patient will call back next month to schedule  Patient needs to wait for eye exam due to insurance coverage   Questions for provider review:    None                                                                                                                                    [facility]     Chart routed to Care Team .

## 2020-01-15 ENCOUNTER — MYC MEDICAL ADVICE (OUTPATIENT)
Dept: FAMILY MEDICINE | Facility: OTHER | Age: 70
End: 2020-01-15

## 2020-01-15 DIAGNOSIS — E11.59 TYPE 2 DIABETES MELLITUS WITH OTHER CIRCULATORY COMPLICATION, WITHOUT LONG-TERM CURRENT USE OF INSULIN (H): Primary | ICD-10-CM

## 2020-01-15 NOTE — TELEPHONE ENCOUNTER
I can offer diabetes ed to meet with him and make a plan to adjust. Order placed.  May also consider ov to discuss.  Mayelin Hernandez MD

## 2020-01-15 NOTE — TELEPHONE ENCOUNTER
"Patient's message is in response to previous [place] message sent to him: \"Dr. David Hinson reviewed this message and she said we could have you see diabetic education so you can discuss this and make a plan to adjust. She did place the order for the referral for this. A  will call you to help you set up an appointment if you'd like. Their phone number is [phone].   You could also consider an office visit with Dr. Hernandez to discuss this. If that's the route you'd like to take, you can call the clinic at [phone] to schedule.     Please let us know if you have any further questions or concerns.     Thank you,   [facility]\"    Will route as FYI.  "

## 2020-01-15 NOTE — TELEPHONE ENCOUNTER
So we could offer to schedule him if he is declining diabetic ed so we can make a plan  Mayelin Hernandez MD

## 2020-01-16 DIAGNOSIS — E11.69 TYPE 2 DIABETES MELLITUS WITH OTHER SPECIFIED COMPLICATION, WITHOUT LONG-TERM CURRENT USE OF INSULIN (H): ICD-10-CM

## 2020-01-16 DIAGNOSIS — E11.9 TYPE 2 DIABETES, HBA1C GOAL < 7% (H): ICD-10-CM

## 2020-01-17 ENCOUNTER — TELEPHONE (OUTPATIENT)
Dept: FAMILY MEDICINE | Facility: OTHER | Age: 70
End: 2020-01-17

## 2020-01-17 RX ORDER — SITAGLIPTIN 50 MG/1
TABLET, FILM COATED ORAL
Qty: 90 TABLET | Refills: 0 | Status: SHIPPED | OUTPATIENT
Start: 2020-01-17 | End: 2020-04-21

## 2020-01-17 NOTE — TELEPHONE ENCOUNTER
Diabetes Education Scheduling Outreach #1:    Call to patient to schedule. Patient declined to schedule.    Marimar Perera  [place] [facility]  Diabetes and Nutrition Scheduling

## 2020-01-17 NOTE — TELEPHONE ENCOUNTER
Prescription approved per [facility] Refill Protocol.  Hakan Carvalho, RN, BSN
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
## 2020-01-27 ENCOUNTER — OFFICE VISIT (OUTPATIENT)
Dept: FAMILY MEDICINE | Facility: OTHER | Age: 70
End: 2020-01-27
Payer: COMMERCIAL

## 2020-01-27 VITALS
HEART RATE: 72 BPM | SYSTOLIC BLOOD PRESSURE: 128 MMHG | WEIGHT: 227 LBS | OXYGEN SATURATION: 100 % | DIASTOLIC BLOOD PRESSURE: 74 MMHG | HEIGHT: 72 IN | RESPIRATION RATE: 16 BRPM | BODY MASS INDEX: 30.75 KG/M2 | TEMPERATURE: 97.4 F

## 2020-01-27 DIAGNOSIS — E11.9 TYPE 2 DIABETES, HBA1C GOAL < 7% (H): ICD-10-CM

## 2020-01-27 DIAGNOSIS — I48.91 ATRIAL FIBRILLATION, UNSPECIFIED TYPE (H): ICD-10-CM

## 2020-01-27 DIAGNOSIS — E11.59 TYPE 2 DIABETES MELLITUS WITH OTHER CIRCULATORY COMPLICATION, WITHOUT LONG-TERM CURRENT USE OF INSULIN (H): Primary | ICD-10-CM

## 2020-01-27 DIAGNOSIS — E11.69 TYPE 2 DIABETES MELLITUS WITH OTHER SPECIFIED COMPLICATION, WITHOUT LONG-TERM CURRENT USE OF INSULIN (H): ICD-10-CM

## 2020-01-27 DIAGNOSIS — E66.01 MORBID OBESITY (H): ICD-10-CM

## 2020-01-27 DIAGNOSIS — I25.118 CORONARY ARTERY DISEASE OF NATIVE ARTERY OF NATIVE HEART WITH STABLE ANGINA PECTORIS (H): ICD-10-CM

## 2020-01-27 DIAGNOSIS — I25.10 CORONARY ARTERY DISEASE INVOLVING NATIVE CORONARY ARTERY OF NATIVE HEART WITHOUT ANGINA PECTORIS: ICD-10-CM

## 2020-01-27 LAB — HBA1C MFR BLD: 9.7 % (ref 0–5.6)

## 2020-01-27 PROCEDURE — 83036 HEMOGLOBIN GLYCOSYLATED A1C: CPT | Performed by: FAMILY MEDICINE

## 2020-01-27 PROCEDURE — 99214 OFFICE O/P EST MOD 30 MIN: CPT | Performed by: FAMILY MEDICINE

## 2020-01-27 PROCEDURE — 36415 COLL VENOUS BLD VENIPUNCTURE: CPT | Performed by: FAMILY MEDICINE

## 2020-01-27 ASSESSMENT — MIFFLIN-ST. JEOR: SCORE: 1822.67
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
## 2020-02-19 ENCOUNTER — OFFICE VISIT (OUTPATIENT)
Dept: FAMILY MEDICINE | Facility: OTHER | Age: 70
End: 2020-02-19
Payer: COMMERCIAL

## 2020-02-19 VITALS
WEIGHT: 219 LBS | BODY MASS INDEX: 29.03 KG/M2 | OXYGEN SATURATION: 98 % | TEMPERATURE: 97.1 F | DIASTOLIC BLOOD PRESSURE: 68 MMHG | SYSTOLIC BLOOD PRESSURE: 134 MMHG | HEIGHT: 73 IN | HEART RATE: 64 BPM | RESPIRATION RATE: 16 BRPM

## 2020-02-19 DIAGNOSIS — I48.91 ATRIAL FIBRILLATION, UNSPECIFIED TYPE (H): ICD-10-CM

## 2020-02-19 DIAGNOSIS — E66.01 MORBID OBESITY (H): ICD-10-CM

## 2020-02-19 DIAGNOSIS — I10 HTN, GOAL BELOW 130/80: ICD-10-CM

## 2020-02-19 DIAGNOSIS — E11.9 TYPE 2 DIABETES, HBA1C GOAL < 7% (H): ICD-10-CM

## 2020-02-19 DIAGNOSIS — E11.59 TYPE 2 DIABETES MELLITUS WITH OTHER CIRCULATORY COMPLICATION, WITHOUT LONG-TERM CURRENT USE OF INSULIN (H): ICD-10-CM

## 2020-02-19 DIAGNOSIS — I25.10 CORONARY ARTERY DISEASE INVOLVING NATIVE CORONARY ARTERY OF NATIVE HEART WITHOUT ANGINA PECTORIS: Primary | ICD-10-CM

## 2020-02-19 DIAGNOSIS — E78.5 HYPERLIPIDEMIA LDL GOAL <70: ICD-10-CM

## 2020-02-19 DIAGNOSIS — I25.118 CORONARY ARTERY DISEASE OF NATIVE ARTERY OF NATIVE HEART WITH STABLE ANGINA PECTORIS (H): ICD-10-CM

## 2020-02-19 PROCEDURE — 99214 OFFICE O/P EST MOD 30 MIN: CPT | Performed by: FAMILY MEDICINE

## 2020-02-19 ASSESSMENT — MIFFLIN-ST. JEOR: SCORE: 1794.32

## 2020-02-20 ENCOUNTER — TELEPHONE (OUTPATIENT)
Dept: FAMILY MEDICINE | Facility: OTHER | Age: 70
End: 2020-02-20

## 2020-02-20 NOTE — TELEPHONE ENCOUNTER
Diabetes Education Scheduling Outreach #1:    Call to patient to schedule. Left message with phone number to call to schedule.    Plan for 2nd outreach attempt within 1 week.    Marimar Perera  [facility]  Diabetes and Nutrition Scheduling

## 2020-03-05 NOTE — TELEPHONE ENCOUNTER
Diabetes Education Scheduling Outreach #2:    Call to patient to schedule. Left message with phone number to call to schedule.    Marimar Perera  [facility]  Diabetes and Nutrition Scheduling

## 2020-03-15 ENCOUNTER — HEALTH MAINTENANCE LETTER (OUTPATIENT)
Age: 70
End: 2020-03-15

## 2020-04-13 DIAGNOSIS — E11.9 TYPE 2 DIABETES MELLITUS WITHOUT COMPLICATION, WITHOUT LONG-TERM CURRENT USE OF INSULIN (H): ICD-10-CM

## 2020-04-13 NOTE — TELEPHONE ENCOUNTER
Pending Prescriptions:                       Disp   Refills    metFORMIN (GLUCOPHAGE) 500 MG tablet [Pha*360 ta*0            Sig: TAKE 2 TABLETS BY MOUTH TWICE DAILY WITH MEALS    Prescription approved per [facility] Refill Protocol.    Mandy Bui, MSN, RN

## 2020-04-21 DIAGNOSIS — E11.9 TYPE 2 DIABETES, HBA1C GOAL < 7% (H): ICD-10-CM

## 2020-04-21 DIAGNOSIS — E11.69 TYPE 2 DIABETES MELLITUS WITH OTHER SPECIFIED COMPLICATION, WITHOUT LONG-TERM CURRENT USE OF INSULIN (H): ICD-10-CM

## 2020-04-21 RX ORDER — SITAGLIPTIN 50 MG/1
TABLET, FILM COATED ORAL
Qty: 90 TABLET | Refills: 0 | Status: SHIPPED | OUTPATIENT
Start: 2020-04-21 | End: 2020-07-24

## 2020-04-21 NOTE — TELEPHONE ENCOUNTER
Pending Prescriptions:                       Disp   Refills    JANUVIA 50 MG tablet [Pharmacy Med Name: *90 tab*0            Sig: TAKE 1 TABLET BY MOUTH EVERY DAY    Prescription approved per [facility] Refill Protocol.    Mandy Bui, MSN, RN

## 2020-05-02 DIAGNOSIS — I10 HTN, GOAL BELOW 140/90: ICD-10-CM

## 2020-05-03 RX ORDER — METOPROLOL TARTRATE 50 MG
TABLET ORAL
Qty: 60 TABLET | Refills: 0 | Status: SHIPPED | OUTPATIENT
Start: 2020-05-03 | End: 2020-05-07

## 2020-05-04 NOTE — TELEPHONE ENCOUNTER
Was supposed to have 4 week f/u from Feb. Really should check up on blood sugars since the new med. Please set up virtual visit. Nikki Hernandez MD
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
## 2020-05-07 ENCOUNTER — VIRTUAL VISIT (OUTPATIENT)
Dept: FAMILY MEDICINE | Facility: OTHER | Age: 70
End: 2020-05-07
Payer: COMMERCIAL

## 2020-05-07 DIAGNOSIS — R80.9 TYPE 2 DIABETES MELLITUS WITH MICROALBUMINURIA, WITHOUT LONG-TERM CURRENT USE OF INSULIN (H): ICD-10-CM

## 2020-05-07 DIAGNOSIS — E11.59 TYPE 2 DIABETES MELLITUS WITH OTHER CIRCULATORY COMPLICATIONS (H): ICD-10-CM

## 2020-05-07 DIAGNOSIS — E78.5 HYPERLIPIDEMIA LDL GOAL <70: ICD-10-CM

## 2020-05-07 DIAGNOSIS — E11.29 TYPE 2 DIABETES MELLITUS WITH MICROALBUMINURIA, WITHOUT LONG-TERM CURRENT USE OF INSULIN (H): ICD-10-CM

## 2020-05-07 DIAGNOSIS — I25.10 CORONARY ARTERY DISEASE INVOLVING NATIVE HEART WITHOUT ANGINA PECTORIS, UNSPECIFIED VESSEL OR LESION TYPE: ICD-10-CM

## 2020-05-07 DIAGNOSIS — E11.59 TYPE 2 DIABETES MELLITUS WITH OTHER CIRCULATORY COMPLICATION, WITHOUT LONG-TERM CURRENT USE OF INSULIN (H): Primary | ICD-10-CM

## 2020-05-07 DIAGNOSIS — I25.10 CORONARY ARTERY DISEASE INVOLVING NATIVE CORONARY ARTERY OF NATIVE HEART WITHOUT ANGINA PECTORIS: ICD-10-CM

## 2020-05-07 DIAGNOSIS — I10 HTN, GOAL BELOW 140/90: ICD-10-CM

## 2020-05-07 DIAGNOSIS — I10 HTN, GOAL BELOW 130/80: ICD-10-CM

## 2020-05-07 DIAGNOSIS — R39.11 URINARY HESITANCY: ICD-10-CM

## 2020-05-07 PROCEDURE — 99214 OFFICE O/P EST MOD 30 MIN: CPT | Mod: 95 | Performed by: FAMILY MEDICINE

## 2020-05-07 RX ORDER — ROSUVASTATIN CALCIUM 40 MG/1
40 TABLET, COATED ORAL DAILY
Qty: 90 TABLET | Refills: 0 | Status: SHIPPED | OUTPATIENT
Start: 2020-05-07 | End: 2022-02-03

## 2020-05-07 RX ORDER — METOPROLOL TARTRATE 50 MG
TABLET ORAL
Qty: 180 TABLET | Refills: 0 | Status: SHIPPED | OUTPATIENT
Start: 2020-05-07 | End: 2020-09-03

## 2020-05-07 RX ORDER — TAMSULOSIN HYDROCHLORIDE 0.4 MG/1
0.4 CAPSULE ORAL DAILY
Qty: 90 CAPSULE | Refills: 0 | Status: SHIPPED | OUTPATIENT
Start: 2020-05-07 | End: 2022-05-22

## 2020-05-07 RX ORDER — LISINOPRIL 2.5 MG/1
2.5 TABLET ORAL DAILY
Qty: 90 TABLET | Refills: 0 | Status: SHIPPED | OUTPATIENT
Start: 2020-05-07 | End: 2022-02-03

## 2020-06-09 ENCOUNTER — OFFICE VISIT (OUTPATIENT)
Dept: ORTHOPEDICS | Facility: CLINIC | Age: 70
End: 2020-06-09
Payer: COMMERCIAL

## 2020-06-09 VITALS — SYSTOLIC BLOOD PRESSURE: 142 MMHG | HEART RATE: 76 BPM | DIASTOLIC BLOOD PRESSURE: 82 MMHG | OXYGEN SATURATION: 96 %

## 2020-06-09 DIAGNOSIS — M75.101 ROTATOR CUFF TEAR ARTHROPATHY OF RIGHT SHOULDER: Primary | ICD-10-CM

## 2020-06-09 DIAGNOSIS — M12.811 ROTATOR CUFF TEAR ARTHROPATHY OF RIGHT SHOULDER: Primary | ICD-10-CM

## 2020-06-09 PROCEDURE — 20610 DRAIN/INJ JOINT/BURSA W/O US: CPT | Mod: RT | Performed by: ORTHOPAEDIC SURGERY

## 2020-06-09 RX ORDER — LIDOCAINE HYDROCHLORIDE 10 MG/ML
5 INJECTION, SOLUTION EPIDURAL; INFILTRATION; INTRACAUDAL; PERINEURAL
Status: DISCONTINUED | OUTPATIENT
Start: 2020-06-09 | End: 2023-08-22

## 2020-06-09 RX ORDER — METHYLPREDNISOLONE ACETATE 80 MG/ML
80 INJECTION, SUSPENSION INTRA-ARTICULAR; INTRALESIONAL; INTRAMUSCULAR; SOFT TISSUE
Status: DISCONTINUED | OUTPATIENT
Start: 2020-06-09 | End: 2023-08-22

## 2020-06-09 RX ADMIN — LIDOCAINE HYDROCHLORIDE 5 ML: 10 INJECTION, SOLUTION EPIDURAL; INFILTRATION; INTRACAUDAL; PERINEURAL at 11:14

## 2020-06-09 RX ADMIN — METHYLPREDNISOLONE ACETATE 80 MG: 80 INJECTION, SUSPENSION INTRA-ARTICULAR; INTRALESIONAL; INTRAMUSCULAR; SOFT TISSUE at 11:14

## 2020-06-09 ASSESSMENT — PAIN SCALES - GENERAL: PAINLEVEL: MODERATE PAIN (5)

## 2020-06-09 NOTE — NURSING NOTE
Genaro to follow up with Primary Care provider regarding elevated blood pressure.    Josiah QUINONES

## 2020-06-09 NOTE — PROGRESS NOTES
Follow up right shoulder cuff tear arthropathy.  He had large unrepaired rotator cuff tear in 2011 by MRI.  Also has left cuff tear arthropathy.  Injection did well on left in January 2020.  Patient desires injection today of right shoulder.  Risks, benefits, potential complications and alternatives were discussed.   With the patient's consent, sterile prep was performed of right shoulder.  Right shoulder was injected with Depomedrol 80 mg and lidocaine at shoulder subacromial space from the posterolateral approach .  Return to clinic as needed.

## 2020-06-09 NOTE — LETTER
6/9/2020         RE: Genaro Barbosa  [address] [facility] [address]        Dear Colleague,    Thank you for referring your patient, Genaro Barbosa, to the [facility]. Please see a copy of my visit note below.    Large Joint Injection/Arthocentesis: R subacromial bursa    Date/Time: 6/9/2020 11:14 AM  Performed by: Mac Smalls MD  Authorized by: Mac Smalls MD     Indications:  Pain and osteoarthritis  Needle Size:  22 G  Guidance: landmark guided    Approach:  Lateral  Location:  Shoulder      Site:  R subacromial bursa  Medications:  80 mg methylPREDNISolone 80 MG/ML; 5 mL lidocaine (PF) 1 %  Outcome:  Tolerated well, no immediate complications  Procedure discussed: discussed risks, benefits, and alternatives    Consent Given by:  Patient  Timeout: timeout called immediately prior to procedure    Prep: patient was prepped and draped in usual sterile fashion            Follow up right shoulder cuff tear arthropathy.  He had large unrepaired rotator cuff tear in 2011 by MRI.  Also has left cuff tear arthropathy.  Injection did well on left in January 2020.  Patient desires injection today of right shoulder.  Risks, benefits, potential complications and alternatives were discussed.   With the patient's consent, sterile prep was performed of right shoulder.  Right shoulder was injected with Depomedrol 80 mg and lidocaine at shoulder subacromial space from the posterolateral approach .  Return to clinic as needed.        Again, thank you for allowing me to participate in the care of your patient.        Sincerely,        Mac Smalls MD

## 2020-06-09 NOTE — PROGRESS NOTES
Large Joint Injection/Arthocentesis: R subacromial bursa    Date/Time: 6/9/2020 11:14 AM  Performed by: Mac Smalls MD  Authorized by: aMc Smalls MD     Indications:  Pain and osteoarthritis  Needle Size:  22 G  Guidance: landmark guided    Approach:  Lateral  Location:  Shoulder      Site:  R subacromial bursa  Medications:  80 mg methylPREDNISolone 80 MG/ML; 5 mL lidocaine (PF) 1 %  Outcome:  Tolerated well, no immediate complications  Procedure discussed: discussed risks, benefits, and alternatives    Consent Given by:  Patient  Timeout: timeout called immediately prior to procedure    Prep: patient was prepped and draped in usual sterile fashion

## 2020-07-09 DIAGNOSIS — E11.9 TYPE 2 DIABETES MELLITUS WITHOUT COMPLICATION, WITHOUT LONG-TERM CURRENT USE OF INSULIN (H): ICD-10-CM

## 2020-07-09 NOTE — TELEPHONE ENCOUNTER
Pending Prescriptions:                       Disp   Refills    metFORMIN (GLUCOPHAGE) 500 MG tablet [Phar*360 ta*0        Sig: TAKE 2 TABLET BY MOUTH TWICE DAILY WITH MEALS      Routing refill request to provider for review/approval because:  Labs not current:  A1C    Mandy Bui, MSN, RN

## 2020-07-20 DIAGNOSIS — E11.69 TYPE 2 DIABETES MELLITUS WITH OTHER SPECIFIED COMPLICATION, WITHOUT LONG-TERM CURRENT USE OF INSULIN (H): ICD-10-CM

## 2020-07-20 DIAGNOSIS — E11.9 TYPE 2 DIABETES, HBA1C GOAL < 7% (H): ICD-10-CM

## 2020-07-20 NOTE — TELEPHONE ENCOUNTER
Pending Prescriptions:                       Disp   Refills    JANUVIA 50 MG tablet [Pharmacy Med Name: *90 tab*0            Sig: TAKE 1 TABLET BY MOUTH EVERY DAY    Needs A1c    Routed to registration.  Monica Murray, BSN, RN, PHN

## 2020-07-23 NOTE — TELEPHONE ENCOUNTER
Called patient and left a message. Need to schedule a lab  Appointment for A1-C per note below. Thank you

## 2020-08-07 DIAGNOSIS — E11.59 TYPE 2 DIABETES MELLITUS WITH OTHER CIRCULATORY COMPLICATION, WITHOUT LONG-TERM CURRENT USE OF INSULIN (H): ICD-10-CM

## 2020-08-07 DIAGNOSIS — E11.59 TYPE 2 DIABETES MELLITUS WITH OTHER CIRCULATORY COMPLICATIONS (H): ICD-10-CM

## 2020-08-07 DIAGNOSIS — I10 HTN, GOAL BELOW 130/80: ICD-10-CM

## 2020-08-07 DIAGNOSIS — I25.10 CORONARY ARTERY DISEASE INVOLVING NATIVE HEART WITHOUT ANGINA PECTORIS, UNSPECIFIED VESSEL OR LESION TYPE: ICD-10-CM

## 2020-08-07 DIAGNOSIS — R39.11 URINARY HESITANCY: ICD-10-CM

## 2020-08-11 RX ORDER — LISINOPRIL 2.5 MG/1
TABLET ORAL
Qty: 90 TABLET | Refills: 0 | OUTPATIENT
Start: 2020-08-11

## 2020-08-11 RX ORDER — EMPAGLIFLOZIN 25 MG/1
TABLET, FILM COATED ORAL
Qty: 90 TABLET | Refills: 0 | OUTPATIENT
Start: 2020-08-11

## 2020-08-11 RX ORDER — TAMSULOSIN HYDROCHLORIDE 0.4 MG/1
CAPSULE ORAL
Qty: 90 CAPSULE | Refills: 0 | OUTPATIENT
Start: 2020-08-11

## 2020-08-11 NOTE — TELEPHONE ENCOUNTER
Previously given phoebe with attempt to contact to schedule curbside labs and virtual follow-up after. Phoebe previously given and were unable to contact.  Looks like he looked at last mychart, may try to contact that way and can bridge if needed until appt  Mayelin Hernandez MD

## 2020-08-11 NOTE — TELEPHONE ENCOUNTER
Pending Prescriptions:                       Disp   Refills    JARDIANCE 25 MG TABS tablet [Pharmacy Med *90 tab*0        Sig: TAKE 1 TABLET BY MOUTH EVERY DAY    lisinopril (ZESTRIL) 2.5 MG tablet [Pharma*90 tab*0        Sig: TAKE 1 TABLET BY MOUTH DAILY    tamsulosin (FLOMAX) 0.4 MG capsule [Pharma*90 cap*0        Sig: TAKE ONE CAPSULE BY MOUTH DAILY    Routing refill request to provider for review/approval because:  Labs out of range:    Lab Results   Component Value Date    A1C 9.7 01/27/2020    A1C 8.0 10/30/2019    A1C 7.9 09/11/2019    A1C 7.7 03/28/2019    A1C 7.0 11/14/2018     BP Readings from Last 3 Encounters:   06/09/20 (!) 142/82   02/19/20 134/68   01/27/20 128/74       Hakan Carvalho, GLORIAN, RN, PHN

## 2020-08-12 NOTE — TELEPHONE ENCOUNTER
Did you offer financial assistance, prescription assistance, or care coordination based on his needs so he doesn't run out of meds.   Mayelin Hernandez MD

## 2020-08-12 NOTE — TELEPHONE ENCOUNTER
Patient calling today. Due to covid 19 he lost his job and his medical insurance.  He is unable to come in for any appts and is unable to  medication at this time. He is hoping to have insurance in place Sept 1st.  He will call back to schedule at that time.  Thank you.

## 2020-08-31 DIAGNOSIS — I10 HTN, GOAL BELOW 140/90: ICD-10-CM

## 2020-08-31 DIAGNOSIS — E11.59 TYPE 2 DIABETES MELLITUS WITH OTHER CIRCULATORY COMPLICATION, WITHOUT LONG-TERM CURRENT USE OF INSULIN (H): ICD-10-CM

## 2020-08-31 DIAGNOSIS — E11.8 TYPE 2 DIABETES MELLITUS WITH COMPLICATION, WITHOUT LONG-TERM CURRENT USE OF INSULIN (H): ICD-10-CM

## 2020-09-01 NOTE — TELEPHONE ENCOUNTER
Patient called clinic again about these prescriptions. He says Dr. Hernandez doesn't understand that he lost his job and doesn't have medical insurance and he needs these medications because he is out. He is asking for a phoebe refill while he gets things figured out. [phone].

## 2020-09-02 NOTE — TELEPHONE ENCOUNTER
Pending Prescriptions:                       Disp   Refills    JARDIANCE 25 MG TABS tablet [Pharmacy Med *90 tab*0        Sig: TAKE 1 TABLET BY MOUTH ONCE DAILY    glipiZIDE (GLUCOTROL) 10 MG tablet [Pharma*180 ta*1        Sig: TAKE 1 TABLET(10 MG) BY MOUTH TWICE DAILY BEFORE           MEALS    metoprolol tartrate (LOPRESSOR) 50 MG tabl*180 ta*0        Sig: TAKE 1 TABLET BY MOUTH EVERY DAY    Routing refill request to provider for review/approval because:  Provider see patient note.  RN unable to refills due to BP and labs

## 2020-09-03 RX ORDER — METOPROLOL TARTRATE 50 MG
TABLET ORAL
Qty: 180 TABLET | Refills: 0 | Status: SHIPPED | OUTPATIENT
Start: 2020-09-03 | End: 2020-09-14

## 2020-09-03 RX ORDER — EMPAGLIFLOZIN 25 MG/1
TABLET, FILM COATED ORAL
Qty: 90 TABLET | Refills: 0 | Status: SHIPPED | OUTPATIENT
Start: 2020-09-03 | End: 2020-12-02

## 2020-09-03 RX ORDER — GLIPIZIDE 10 MG/1
TABLET ORAL
Qty: 180 TABLET | Refills: 1 | Status: SHIPPED | OUTPATIENT
Start: 2020-09-03 | End: 2021-03-11

## 2020-09-14 ENCOUNTER — TELEPHONE (OUTPATIENT)
Dept: FAMILY MEDICINE | Facility: OTHER | Age: 70
End: 2020-09-14

## 2020-09-14 DIAGNOSIS — I10 HTN, GOAL BELOW 140/90: ICD-10-CM

## 2020-09-14 RX ORDER — METOPROLOL TARTRATE 50 MG
50 TABLET ORAL 2 TIMES DAILY
Qty: 180 TABLET | Refills: 0 | Status: SHIPPED | OUTPATIENT
Start: 2020-09-14 | End: 2020-12-22

## 2020-09-14 NOTE — TELEPHONE ENCOUNTER
Message to prescriber:  metoprolol tartrate (LOPRESSOR) 50 MG tablet     Patient says taking 1 BID Please send new Rx ASAP- pt is out.

## 2020-09-14 NOTE — TELEPHONE ENCOUNTER
Summary:    Patient is due/failing the following:   BMP, Microalbumin, A1C, LDL and PHYSICAL    Action needed:   Patient needs office visit for Physical and diabetic follow up. and Patient needs fasting lab only appointment    Type of outreach:    Phone, left message for patient to call back.     Questions for provider review:    None                                                                                                                                    Judith Sheth CMA       Chart routed to Care Team .      Panel Management Review      Patient has the following on his problem list:     Diabetes    ASA: Passed    Last A1C  Lab Results   Component Value Date    A1C 9.7 01/27/2020    A1C 8.0 10/30/2019    A1C 7.9 09/11/2019    A1C 7.7 03/28/2019    A1C 7.0 11/14/2018     A1C tested: FAILED    Last LDL:    Lab Results   Component Value Date    CHOL 98 10/30/2019     Lab Results   Component Value Date    HDL 38 10/30/2019     Lab Results   Component Value Date    LDL 34 10/30/2019     Lab Results   Component Value Date    TRIG 131 10/30/2019     Lab Results   Component Value Date    CHOLHDLRATIO 3.6 04/22/2015     Lab Results   Component Value Date    NHDL 60 10/30/2019       Is the patient on a Statin? YES             Is the patient on Aspirin? YES    Medications     HMG CoA Reductase Inhibitors     rosuvastatin (CRESTOR) 40 MG tablet       Salicylates     aspirin 81 MG EC tablet             Last three blood pressure readings:  BP Readings from Last 3 Encounters:   06/09/20 (!) 142/82   02/19/20 134/68   01/27/20 128/74          Tobacco History:     History   Smoking Status     Former Smoker   Smokeless Tobacco     Never Used     Comment: quit age 26         Hypertension   Last three blood pressure readings:  BP Readings from Last 3 Encounters:   06/09/20 (!) 142/82   02/19/20 134/68   01/27/20 128/74     Blood pressure: FAILED    HTN Guidelines:  Less than 140/90      Composite cancer screening  Chart  review shows that this patient is due/due soon for the following None

## 2020-10-09 DIAGNOSIS — E11.9 TYPE 2 DIABETES MELLITUS WITHOUT COMPLICATION, WITHOUT LONG-TERM CURRENT USE OF INSULIN (H): ICD-10-CM

## 2020-10-10 NOTE — TELEPHONE ENCOUNTER
Pending Prescriptions:                       Disp   Refills    metFORMIN (GLUCOPHAGE) 500 MG tablet [Pha*360 ta*0            Sig: TAKE 2 TABLETS BY MOUTH TWICE DAILY WITH MEALS    Medication is being filled for 1 time phoebe refill only due to:  Patient is due for diabetes    Please call and help schedule.  Thank you!

## 2020-10-16 NOTE — TELEPHONE ENCOUNTER
Called patient, reviewed notes below. Patient stated that he will call next week to get labs set and a virtual visit to go over everything. Thank you

## 2020-11-24 ENCOUNTER — TELEPHONE (OUTPATIENT)
Dept: FAMILY MEDICINE | Facility: OTHER | Age: 70
End: 2020-11-24

## 2020-11-24 NOTE — TELEPHONE ENCOUNTER
Summary:    Patient is due/failing the following:   Medicare annual wellness, CMP, Microalbumin, BMP, A1C and LDL    Action needed:   Patient needs office visit for Medicare annual wellness visit. and Patient needs fasting lab only appointment    Type of outreach:    Phone, spoke to patient.  patient scheduled     Questions for provider review:    None                                                                                                                                    Judith Sheth CMA       Chart routed to Care Team .          Panel Management Review      Patient has the following on his problem list:     Diabetes    ASA: Passed    Last A1C  Lab Results   Component Value Date    A1C 9.7 01/27/2020    A1C 8.0 10/30/2019    A1C 7.9 09/11/2019    A1C 7.7 03/28/2019    A1C 7.0 11/14/2018     A1C tested: FAILED    Last LDL:    Lab Results   Component Value Date    CHOL 98 10/30/2019     Lab Results   Component Value Date    HDL 38 10/30/2019     Lab Results   Component Value Date    LDL 34 10/30/2019     Lab Results   Component Value Date    TRIG 131 10/30/2019     Lab Results   Component Value Date    CHOLHDLRATIO 3.6 04/22/2015     Lab Results   Component Value Date    NHDL 60 10/30/2019       Is the patient on a Statin? YES             Is the patient on Aspirin? YES    Medications     HMG CoA Reductase Inhibitors     rosuvastatin (CRESTOR) 40 MG tablet       Salicylates     aspirin 81 MG EC tablet             Last three blood pressure readings:  BP Readings from Last 3 Encounters:   06/09/20 (!) 142/82   02/19/20 134/68   01/27/20 128/74            Tobacco History:     History   Smoking Status     Former Smoker   Smokeless Tobacco     Never Used     Comment: quit age 26         Hypertension   Last three blood pressure readings:  BP Readings from Last 3 Encounters:   06/09/20 (!) 142/82   02/19/20 134/68   01/27/20 128/74     Blood pressure: FAILED    HTN Guidelines:  Less than 140/90      Composite  cancer screening  Chart review shows that this patient is due/due soon for the following Colonoscopy

## 2020-11-25 NOTE — PROGRESS NOTES
"SUBJECTIVE:   Genaro Barbosa is a 70 year old male who presents for Preventive Visit.      Patient has been advised of split billing requirements and indicates understanding: Yes     Are you in the first 12 months of your Medicare coverage?  No  Yes, needs vision      R- 20/32 L- 20/25 Both- 20/25    Healthy Habits:    In general, how would you rate your overall health?  Excellent    Frequency of exercise:  1 day/week    Duration of exercise:  15-30 minutes    Do you usually eat at least 4 servings of fruit and vegetables a day, include whole grains    & fiber and avoid regularly eating high fat or \"junk\" foods?  Yes    Taking medications regularly:  Yes    Barriers to taking medications:  None    Medication side effects:  None    Ability to successfully perform activities of daily living:  No assistance needed    Home Safety:  No safety concerns identified    Hearing Impairment:  No hearing concerns    In the past 6 months, have you been bothered by leaking of urine?  No    In general, how would you rate your overall mental or emotional health?  Very good      PHQ-2 Total Score:    Additional concerns today:  No    Do you feel safe in your environment? Yes    Have you ever done Advance Care Planning? (For example, a Health Directive, POLST, or a discussion with a medical provider or your loved ones about your wishes): No, advance care planning information given to patient to review.  Patient declined advance care planning discussion at this time.      Fall risk  Fallen 2 or more times in the past year?: No  Any fall with injury in the past year?: No    Cognitive Screening   1) Repeat 3 items (Leader, Season, Table)    2) Clock draw: Normal   3) 3 item recall: Recalls 2 objects   Results: NORMAL clock, 1-2 items recalled: COGNITIVE IMPAIRMENT LESS LIKELY    Mini-CogTM Copyright DIAZ Lynn. Licensed by the author for use in [facility]; reprinted with permission (jay@.[facility]). All rights reserved.      Do " you have sleep apnea, excessive snoring or daytime drowsiness?: no    Reviewed and updated as needed this visit by clinical staff  Tobacco  Allergies  Meds  Problems  Med Hx  Surg Hx  Fam Hx  Soc Hx          Reviewed and updated as needed this visit by Provider  Tobacco  Allergies  Meds  Problems  Med Hx  Surg Hx  Fam Hx         Social History     Tobacco Use     Smoking status: Former Smoker     Smokeless tobacco: Never Used     Tobacco comment: quit age 26   Substance Use Topics     Alcohol use: Yes     Comment: approximately 12 beers per week         Alcohol Use 7/11/2019   Prescreen: >3 drinks/day or >7 drinks/week? Not Applicable         Diabetes Follow-up    How often are you checking your blood sugar? One time daily  What time of day are you checking your blood sugars (select all that apply)?  Before meals  Have you had any blood sugars above 200?  Yes   Have you had any blood sugars below 70?  No    What symptoms do you notice when your blood sugar is low?  None    What concerns do you have today about your diabetes? Blood sugar is often over 200     Do you have any of these symptoms? (Select all that apply)  No numbness or tingling in feet.  No redness, sores or blisters on feet.  No complaints of excessive thirst.  No reports of blurry vision.  No significant changes to weight.    Have you had a diabetic eye exam in the last 12 months? No        BP Readings from Last 2 Encounters:   12/02/20 120/60   06/09/20 (!) 142/82     Hemoglobin A1C (%)   Date Value   11/30/2020 8.4 (H)   01/27/2020 9.7 (H)     LDL Cholesterol Calculated (mg/dL)   Date Value   11/30/2020 82   10/30/2019 34               Hyperlipidemia Follow-Up      Are you regularly taking any medication or supplement to lower your cholesterol?   Yes- crestor    Are you having muscle aches or other side effects that you think could be caused by your cholesterol lowering medication?  No    Hypertension Follow-up      Do you check your  "blood pressure regularly outside of the clinic? No     Are you following a low salt diet? Yes    Are your blood pressures ever more than 140 on the top number (systolic) OR more   than 90 on the bottom number (diastolic), for example 140/90? No          Current providers sharing in care for this patient include:     Patient Care Team:  Mayelin Hernandez MD as PCP - General (Family Practice)  Mayelin Hernandez MD as Assigned PCP  Mac Smalls MD as Assigned Musculoskeletal Provider    The following health maintenance items are reviewed in Epic and correct as of today:  Health Maintenance   Topic Date Due     ZOSTER IMMUNIZATION (1 of 2) 03/22/1998     Pneumococcal Vaccine: 65+ Years (1 of 1 - PPSV23) 03/22/2013     EYE EXAM  10/11/2013     DIABETIC FOOT EXAM  03/28/2020     CMP  07/11/2020     INFLUENZA VACCINE (1) 09/01/2020     COLORECTAL CANCER SCREENING  11/08/2020     FALL RISK ASSESSMENT  05/07/2021     A1C  05/30/2021     BMP  11/30/2021     LIPID  11/30/2021     MICROALBUMIN  11/30/2021     MEDICARE ANNUAL WELLNESS VISIT  12/02/2021     DTAP/TDAP/TD IMMUNIZATION (2 - Td) 12/16/2023     ADVANCE CARE PLANNING  12/02/2025     HEPATITIS C SCREENING  Completed     MIGRAINE ACTION PLAN  Completed     PHQ-2  Completed     AORTIC ANEURYSM SCREENING (SYSTEM ASSIGNED)  Completed     Pneumococcal Vaccine: Pediatrics (0 to 5 Years) and At-Risk Patients (6 to 64 Years)  Aged Out     IPV IMMUNIZATION  Aged Out     MENINGITIS IMMUNIZATION  Aged Out     Labs reviewed in EPIC      Review of Systems  Constitutional, HEENT, cardiovascular, pulmonary, GI, , musculoskeletal, neuro, skin, endocrine and psych systems are negative, except as otherwise noted.    OBJECTIVE:   /60   Pulse 66   Temp 97.7  F (36.5  C) (Temporal)   Resp 16   Ht 1.838 m (6' 0.36\")   Wt 97.1 kg (214 lb)   SpO2 98%   BMI 28.73 kg/m   Estimated body mass index is 28.73 kg/m  as calculated from the following:    Height as of this " "encounter: 1.838 m (6' 0.36\").    Weight as of this encounter: 97.1 kg (214 lb).  Physical Exam  GENERAL: healthy, alert and no distress  EYES: Eyes grossly normal to inspection, PERRL and conjunctivae and sclerae normal  HENT: ear canals and TM's normal, nose and mouth without ulcers or lesions  NECK: no adenopathy, no asymmetry, masses, or scars and thyroid normal to palpation  RESP: lungs clear to auscultation - no rales, rhonchi or wheezes  CV: regular rate and rhythm, normal S1 S2, no S3 or S4, no murmur, click or rub, no peripheral edema and peripheral pulses strong  ABDOMEN: soft, nontender, no hepatosplenomegaly, no masses and bowel sounds normal  MS: no gross musculoskeletal defects noted, no edema  SKIN: no suspicious lesions or rashes  NEURO: Normal strength and tone, mentation intact and speech normal  PSYCH: mentation appears normal, affect normal/bright        ASSESSMENT / PLAN:       ICD-10-CM    1. Routine general medical examination at a health care facility  Z00.00    2. Type 2 diabetes mellitus with other circulatory complication, without long-term current use of insulin (H)  E11.59 empagliflozin (JARDIANCE) 25 MG TABS tablet     OFFICE/OUTPT VISIT,EST,LEVL IV   3. Type 2 diabetes mellitus without complication, without long-term current use of insulin (H)  E11.9 metFORMIN (GLUCOPHAGE) 500 MG tablet     OFFICE/OUTPT VISIT,EST,LEVL IV   4. Encounter for Medicare annual wellness exam  Z00.00    5. Screening for hyperlipidemia  Z13.220    6. Obesity (BMI 35.0-39.9) with comorbidity (H)  E66.01    7. Hyperlipidemia LDL goal <70  E78.5    8. Coronary artery disease of native artery of native heart with stable angina pectoris (H)  I25.118        Patient is new to medicare and is doing well. Planning to set up diabetes eye exam and aware of glaucoma screeningn recommendations. Working on increasing exercise again.  Diabetes has been high since his heart surgery. Heart has done well. But finally has " "improved blood sugars with the addition of jardiance. He did not find any changes with the januvia and as it has a lot of extra expense, he would like to try to go off of this. Agreed. Also discussed the extra possible 500 mg of metformin and he will try this. Plan follow-up 3 months and call with any increasing trends.    Patient has been advised of split billing requirements and indicates understanding: Yes  COUNSELING:  Reviewed preventive health counseling, as reflected in patient instructions       Regular exercise       Healthy diet/nutrition    Estimated body mass index is 28.73 kg/m  as calculated from the following:    Height as of this encounter: 1.838 m (6' 0.36\").    Weight as of this encounter: 97.1 kg (214 lb).    Weight management plan: Discussed healthy diet and exercise guidelines    He reports that he has quit smoking. He has never used smokeless tobacco.      Appropriate preventive services were discussed with this patient, including applicable screening as appropriate for cardiovascular disease, diabetes, osteopenia/osteoporosis, and glaucoma.  As appropriate for age/gender, discussed screening for colorectal cancer, prostate cancer, breast cancer, and cervical cancer. Checklist reviewing preventive services available has been given to the patient.    Reviewed patients plan of care and provided an AVS. The Basic Care Plan (routine screening as documented in Health Maintenance) for Genaro meets the Care Plan requirement. This Care Plan has been established and reviewed with the Patient.    Counseling Resources:  ATP IV Guidelines  Pooled Cohorts Equation Calculator  Breast Cancer Risk Calculator  Breast Cancer: Medication to Reduce Risk  FRAX Risk Assessment  ICSI Preventive Guidelines  Dietary Guidelines for Americans, 2010  USDA's MyPlate  ASA Prophylaxis  Lung CA Screening    Mayelin Hernandez MD, MD  [facility]    Identified Health Risks:    He is at risk for lack of exercise " and has been provided with information to increase physical activity for the benefit of his well-being.

## 2020-11-25 NOTE — PATIENT INSTRUCTIONS
Preventive Health Recommendations:     See your health care provider every year to    Review health changes.     Discuss preventive care.      Review your medicines if your doctor has prescribed any.      Talk with your health care provider about whether you should have a test to screen for prostate cancer (PSA).    Every 3 years, have a diabetes test (fasting glucose). If you are at risk for diabetes, you should have this test more often.    Every 5 years, have a cholesterol test. Have this test more often if you are at risk for high cholesterol or heart disease.     Every 10 years, have a colonoscopy. Or, have a yearly FIT test (stool test). These exams will check for colon cancer.    Talk to with your health care provider about screening for Abdominal Aortic Aneurysm if you have a family history of AAA or have a history of smoking.    Shots:     Get a flu shot each year.     Get a tetanus shot every 10 years.     Talk to your doctor about your pneumonia vaccines. There are now two you should receive - Pneumovax (PPSV 23) and Prevnar (PCV 13).     Talk to your pharmacist about a shingles vaccine.     Talk to your doctor about the hepatitis B vaccine.  Nutrition:     Eat at least 5 servings of fruits and vegetables each day.     Eat whole-grain bread, whole-wheat pasta and brown rice instead of white grains and rice.     Get adequate Calcium and Vitamin D.   Lifestyle    Exercise for at least 150 minutes a week (30 minutes a day, 5 days a week). This will help you control your weight and prevent disease.     Limit alcohol to one drink per day.     No smoking.     Wear sunscreen to prevent skin cancer.    See your dentist every six months for an exam and cleaning.    See your eye doctor every 1 to 2 years to screen for conditions such as glaucoma, macular degeneration, cataracts, etc.    Personalized Prevention Plan  You are due for the preventive services outlined below.  Your care team is available to assist you  in scheduling these services.  If you have already completed any of these items, please share that information with your care team to update in your medical record.  Health Maintenance Due   Topic Date Due     Zoster (Shingles) Vaccine (1 of 2) 03/22/1998     Annual Wellness Visit  03/22/2013     Pneumococcal Vaccine (1 of 1 - PPSV23) 03/22/2013     Eye Exam  10/11/2013     Discuss Advance Care Planning  10/03/2016     Diabetic Foot Exam  03/28/2020     Comprehensive Metabolic Panel  07/11/2020     Kidney Microalbumin Urine Test  07/11/2020     A1C Lab  07/27/2020     Flu Vaccine (1) 09/01/2020     Basic Metabolic Panel  09/11/2020     Cholesterol Lab  10/30/2020     Colorectal Cancer Screening  11/08/2020     Patient Education   Personalized Prevention Plan  You are due for the preventive services outlined below.  Your care team is available to assist you in scheduling these services.  If you have already completed any of these items, please share that information with your care team to update in your medical record.  Health Maintenance Due   Topic Date Due     Zoster (Shingles) Vaccine (1 of 2) 03/22/1998     Pneumococcal Vaccine (1 of 1 - PPSV23) 03/22/2013     Eye Exam  10/11/2013     Diabetic Foot Exam  03/28/2020     Comprehensive Metabolic Panel  07/11/2020     Flu Vaccine (1) 09/01/2020     Colorectal Cancer Screening  11/08/2020       Exercise for a Healthier Heart     Exercise with a friend. When activity is fun, you're more likely to stick with it.   You may wonder how you can improve the health of your heart. If you re thinking about exercise, you re on the right track. You don t need to become an athlete. But you do need a certain amount of brisk exercise to help strengthen your heart. If you have been diagnosed with a heart condition, your healthcare provider may advise exercise to help stabilize your condition. To help make exercise a habit, choose safe, fun activities.   Before you start  Check with  your healthcare provider before starting an exercise program. This is especially important if you have not been active for a while. It's also important if you have a long-term (chronic) health problem such as heart disease, diabetes, or obesity. Or if you are at high risk for having these problems.   Why exercise?  Exercising regularly offers many healthy rewards. It can help you do all of the following:    Improve your blood cholesterol level to help prevent further heart trouble    Lower your blood pressure to help prevent a stroke or heart attack    Control diabetes, or reduce your risk of getting this disease    Improve your heart and lung function    Reach and stay at a healthy weight    Make your muscles stronger so you can stay active    Prevent falls and fractures by slowing the loss of bone mass (osteoporosis)    Manage stress better    Reduce your blood pressure    Improve your sense of self and your body image  Exercise tips      Ease into your routine. Set small goals. Then build on them. If you are not sure what your activity level should be, talk with your healthcare provider first before starting an exercise routine.    Exercise on most days. Aim for a total of 150 minutes (2 hours and 30 minutes) or more of moderate-intensity aerobic activity each week. Or 75 minutes (1 hour and 15 minutes) or more of vigorous-intensity aerobic activity each week. Or try for a combination of both. Moderate activity means that you breathe heavier and your heart rate increases but you can still talk. Think about doing 40 minutes of moderate exercise, 3 to 4 times a week. For best results, activity should last for about 40 minutes to lower blood pressure and cholesterol. It's OK to work up to the 40-minute period over time. Examples of moderate-intensity activity are walking 1 mile in 15 minutes. Or doing 30 to 45 minutes of yard work.    Step up your daily activity level.  Along with your exercise program, try being  more active the whole day. Walk instead of drive. Or park further away so that you take more steps each day. Do more household tasks or yard work. You may not be able to meet the advised mount of physical activity. But doing some moderate- or vigorous-intensity aerobic activity can help reduce your risk for heart disease. Your healthcare provider can help you figure out what is best for you.    Choose 1 or more activities you enjoy.  Walking is one of the easiest things you can do. You can also try swimming, riding a bike, dancing, or taking an exercise class.    When to call your healthcare provider  Call your healthcare provider if you have any of these:     Chest pain or feel dizzy or lightheaded    Burning, tightness, pressure, or heaviness in your chest, neck, shoulders, back, or arms    Abnormal shortness of breath    More joint or muscle pain    A very fast or irregular heartbeat (palpitations)  [place] last reviewed this educational content on 7/1/2019 2000-2020 [employer], [employer]. [address] [place] [address], [place] [address]. All rights reserved. This information is not intended as a substitute for professional medical care. Always follow your healthcare professional's instructions.

## 2020-11-30 DIAGNOSIS — E11.29 TYPE 2 DIABETES MELLITUS WITH MICROALBUMINURIA, WITHOUT LONG-TERM CURRENT USE OF INSULIN (H): ICD-10-CM

## 2020-11-30 DIAGNOSIS — E11.59 TYPE 2 DIABETES MELLITUS WITH OTHER CIRCULATORY COMPLICATION, WITHOUT LONG-TERM CURRENT USE OF INSULIN (H): ICD-10-CM

## 2020-11-30 DIAGNOSIS — E78.5 HYPERLIPIDEMIA LDL GOAL <70: ICD-10-CM

## 2020-11-30 DIAGNOSIS — I10 HTN, GOAL BELOW 140/90: ICD-10-CM

## 2020-11-30 DIAGNOSIS — R80.9 TYPE 2 DIABETES MELLITUS WITH MICROALBUMINURIA, WITHOUT LONG-TERM CURRENT USE OF INSULIN (H): ICD-10-CM

## 2020-11-30 LAB
ANION GAP SERPL CALCULATED.3IONS-SCNC: 5 MMOL/L (ref 3–14)
BUN SERPL-MCNC: 22 MG/DL (ref 7–30)
CALCIUM SERPL-MCNC: 10.3 MG/DL (ref 8.5–10.1)
CHLORIDE SERPL-SCNC: 107 MMOL/L (ref 94–109)
CHOLEST SERPL-MCNC: 153 MG/DL
CO2 SERPL-SCNC: 25 MMOL/L (ref 20–32)
CREAT SERPL-MCNC: 1.23 MG/DL (ref 0.66–1.25)
CREAT UR-MCNC: 43 MG/DL
GFR SERPL CREATININE-BSD FRML MDRD: 58 ML/MIN/{1.73_M2}
GLUCOSE SERPL-MCNC: 193 MG/DL (ref 70–99)
HBA1C MFR BLD: 8.4 % (ref 0–5.6)
HDLC SERPL-MCNC: 43 MG/DL
LDLC SERPL CALC-MCNC: 82 MG/DL
MICROALBUMIN UR-MCNC: 18 MG/L
MICROALBUMIN/CREAT UR: 42.06 MG/G CR (ref 0–17)
NONHDLC SERPL-MCNC: 110 MG/DL
POTASSIUM SERPL-SCNC: 5 MMOL/L (ref 3.4–5.3)
SODIUM SERPL-SCNC: 137 MMOL/L (ref 133–144)
TRIGL SERPL-MCNC: 138 MG/DL

## 2020-11-30 PROCEDURE — 83036 HEMOGLOBIN GLYCOSYLATED A1C: CPT | Performed by: FAMILY MEDICINE

## 2020-11-30 PROCEDURE — 80048 BASIC METABOLIC PNL TOTAL CA: CPT | Performed by: FAMILY MEDICINE

## 2020-11-30 PROCEDURE — 36415 COLL VENOUS BLD VENIPUNCTURE: CPT | Performed by: FAMILY MEDICINE

## 2020-11-30 PROCEDURE — 80061 LIPID PANEL: CPT | Performed by: FAMILY MEDICINE

## 2020-11-30 PROCEDURE — 82043 UR ALBUMIN QUANTITATIVE: CPT | Performed by: FAMILY MEDICINE

## 2020-11-30 NOTE — RESULT ENCOUNTER NOTE
Genaro, your results are a little better, upcoming appt to discuss further.  Please let me know if you have any questions.  Mayelin Hernandez MD

## 2020-12-02 ENCOUNTER — OFFICE VISIT (OUTPATIENT)
Dept: FAMILY MEDICINE | Facility: OTHER | Age: 70
End: 2020-12-02
Payer: COMMERCIAL

## 2020-12-02 VITALS
RESPIRATION RATE: 16 BRPM | OXYGEN SATURATION: 98 % | HEIGHT: 72 IN | WEIGHT: 214 LBS | SYSTOLIC BLOOD PRESSURE: 120 MMHG | DIASTOLIC BLOOD PRESSURE: 60 MMHG | HEART RATE: 66 BPM | TEMPERATURE: 97.7 F | BODY MASS INDEX: 28.99 KG/M2

## 2020-12-02 DIAGNOSIS — I25.118 CORONARY ARTERY DISEASE OF NATIVE ARTERY OF NATIVE HEART WITH STABLE ANGINA PECTORIS (H): ICD-10-CM

## 2020-12-02 DIAGNOSIS — Z00.00 ENCOUNTER FOR MEDICARE ANNUAL WELLNESS EXAM: ICD-10-CM

## 2020-12-02 DIAGNOSIS — Z13.220 SCREENING FOR HYPERLIPIDEMIA: ICD-10-CM

## 2020-12-02 DIAGNOSIS — Z00.00 ROUTINE GENERAL MEDICAL EXAMINATION AT A HEALTH CARE FACILITY: Primary | ICD-10-CM

## 2020-12-02 DIAGNOSIS — E11.59 TYPE 2 DIABETES MELLITUS WITH OTHER CIRCULATORY COMPLICATION, WITHOUT LONG-TERM CURRENT USE OF INSULIN (H): ICD-10-CM

## 2020-12-02 DIAGNOSIS — E66.01 MORBID OBESITY (H): ICD-10-CM

## 2020-12-02 DIAGNOSIS — E78.5 HYPERLIPIDEMIA LDL GOAL <70: ICD-10-CM

## 2020-12-02 DIAGNOSIS — E11.9 TYPE 2 DIABETES MELLITUS WITHOUT COMPLICATION, WITHOUT LONG-TERM CURRENT USE OF INSULIN (H): ICD-10-CM

## 2020-12-02 PROCEDURE — G0402 INITIAL PREVENTIVE EXAM: HCPCS | Performed by: FAMILY MEDICINE

## 2020-12-02 PROCEDURE — 99214 OFFICE O/P EST MOD 30 MIN: CPT | Mod: 25 | Performed by: FAMILY MEDICINE

## 2020-12-02 ASSESSMENT — MIFFLIN-ST. JEOR: SCORE: 1764.45

## 2020-12-02 ASSESSMENT — ACTIVITIES OF DAILY LIVING (ADL): CURRENT_FUNCTION: NO ASSISTANCE NEEDED

## 2020-12-03 ENCOUNTER — TELEPHONE (OUTPATIENT)
Dept: FAMILY MEDICINE | Facility: OTHER | Age: 70
End: 2020-12-03

## 2020-12-03 NOTE — TELEPHONE ENCOUNTER
Prior Authorization Retail Medication Request    Medication/Dose: empagliflozin (JARDIANCE) 25 MG TABS tablet  ICD code (if different than what is on RX):    Previously Tried and Failed:    Rationale:      Insurance Name:   Insurance ID:        Pharmacy Information (if different than what is on RX)  Name:    Phone:

## 2020-12-03 NOTE — TELEPHONE ENCOUNTER
Reason for Call:  Medication or medication refill:    Do you use a [facility]?  Name of the pharmacy and phone number for the current request:  [facility]    Name of the medication requested: jardiance    Other request: yared said they will be faxing you a form to fill out for jardiance as it is 380 dollars. Per patient.    Can we leave a detailed message on this number? YES    Phone number patient can be reached at: Home number on file [phone] (home)    Best Time: any    Call taken on 12/3/2020 at 9:51 AM by April Draper

## 2020-12-03 NOTE — TELEPHONE ENCOUNTER
Central Prior Authorization Team   Phone: [phone]      PA NOT NEEDED    Medication: empagliflozin (JARDIANCE) 25 MG TABS tablet  Insurance Company: [employer] - Phone [phone] Fax [phone]  Pharmacy Filling the Rx: [id number] [place], [place] - [address]  Filling Pharmacy Phone: [phone]  Filling Pharmacy Fax:    Start Date: 12/3/2020    Started PA on CMM and a response of Available without authorization.  Called pharmacy to see what rejection they are getting.  Per RPH at the pharmacy the medication was able to be processed through insurance but it still has a higher copay.  Without insurance it's over $1800 with insurance still running over $300.

## 2020-12-04 ENCOUNTER — MYC MEDICAL ADVICE (OUTPATIENT)
Dept: FAMILY MEDICINE | Facility: OTHER | Age: 70
End: 2020-12-04

## 2020-12-05 ENCOUNTER — MYC MEDICAL ADVICE (OUTPATIENT)
Dept: FAMILY MEDICINE | Facility: OTHER | Age: 70
End: 2020-12-05

## 2020-12-07 NOTE — TELEPHONE ENCOUNTER
Reviewed form, it states Prior Authorization on the top but the bottom part is for being eligible for a tier exception/lower cost and wants providers signature.  The PA team already attempted a PA.  Completed what I could on form and gave to Dr Hernandez

## 2020-12-07 NOTE — TELEPHONE ENCOUNTER
Aniya reply sent, nothing received from [employer] as of yet.     Called and spoke to [employer] to inquire about this form, they stated that the medication would cost $141 - they wanted to verify fax number so gave them the pod fax instead of clinic fax.    They stated they will be faxing us this, reference number is [id number]    Abena Muir MA

## 2020-12-21 DIAGNOSIS — I10 HTN, GOAL BELOW 140/90: ICD-10-CM

## 2020-12-22 RX ORDER — METOPROLOL TARTRATE 50 MG
50 TABLET ORAL 2 TIMES DAILY
Qty: 180 TABLET | Refills: 0 | Status: SHIPPED | OUTPATIENT
Start: 2020-12-22 | End: 2022-02-03

## 2020-12-23 NOTE — TELEPHONE ENCOUNTER
Prescription approved per [facility] Refill Protocol.  Maria Teresa Avalos RN

## 2021-01-10 ENCOUNTER — HEALTH MAINTENANCE LETTER (OUTPATIENT)
Age: 71
End: 2021-01-10

## 2021-03-11 DIAGNOSIS — E11.8 TYPE 2 DIABETES MELLITUS WITH COMPLICATION, WITHOUT LONG-TERM CURRENT USE OF INSULIN (H): ICD-10-CM

## 2021-03-11 NOTE — TELEPHONE ENCOUNTER
Pending Prescriptions:                       Disp   Refills    glipiZIDE (GLUCOTROL) 10 MG tablet         180 ta*1        Sig: Take 1 tablet (10 mg) by mouth 2 times daily (before           meals)    Routing refill request to provider for review/approval because:  Labs out of range:    Lab Results   Component Value Date    A1C 8.4 11/30/2020    A1C 9.7 01/27/2020    A1C 8.0 10/30/2019    A1C 7.9 09/11/2019    A1C 7.7 03/28/2019

## 2021-03-12 RX ORDER — GLIPIZIDE 10 MG/1
10 TABLET ORAL
Qty: 180 TABLET | Refills: 0 | Status: SHIPPED | OUTPATIENT
Start: 2021-03-12 | End: 2021-06-09

## 2021-03-12 NOTE — TELEPHONE ENCOUNTER
AE patient. Shouldn't have been routed to me. Never saw him.    AE is out. She last saw him 12/2/20, told to recheck in 3 months. Will give refill. Please assist in scheduling diabetes recheck with SHAISTA.    Chad Clifford PA-C  [facility]

## 2021-05-11 ENCOUNTER — TELEPHONE (OUTPATIENT)
Dept: FAMILY MEDICINE | Facility: OTHER | Age: 71
End: 2021-05-11

## 2021-05-11 NOTE — TELEPHONE ENCOUNTER
Patient Quality Outreach Summary      Summary:    Patient is due/failing the following:   Diabetic Follow-Up Visit    Type of outreach:    Phone, left message for patient/parent to call back.    Questions for provider review:    None                                                                                                                    Judith Sheth CMA       Chart routed to Care Team.

## 2021-06-08 DIAGNOSIS — E11.59 TYPE 2 DIABETES MELLITUS WITH OTHER CIRCULATORY COMPLICATION, WITHOUT LONG-TERM CURRENT USE OF INSULIN (H): ICD-10-CM

## 2021-06-08 DIAGNOSIS — E11.8 TYPE 2 DIABETES MELLITUS WITH COMPLICATION, WITHOUT LONG-TERM CURRENT USE OF INSULIN (H): ICD-10-CM

## 2021-06-08 DIAGNOSIS — E11.9 TYPE 2 DIABETES MELLITUS WITHOUT COMPLICATION, WITHOUT LONG-TERM CURRENT USE OF INSULIN (H): ICD-10-CM

## 2021-06-09 RX ORDER — GLIPIZIDE 10 MG/1
10 TABLET ORAL
Qty: 180 TABLET | Refills: 0 | Status: SHIPPED | OUTPATIENT
Start: 2021-06-09 | End: 2022-02-03

## 2021-06-09 NOTE — TELEPHONE ENCOUNTER
Pending Prescriptions:                       Disp   Refills    empagliflozin (JARDIANCE) 25 MG TABS tabl*90 tab*0            Sig: Take 1 tablet (25 mg) by mouth daily    glipiZIDE (GLUCOTROL) 10 MG tablet        180 ta*0            Sig: Take 1 tablet (10 mg) by mouth 2 times daily           (before meals)  Refused Prescriptions:                       Disp   Refills    metFORMIN (GLUCOPHAGE) 500 MG tablet       450 ta*1        Sig: TAKE 2 TABLETS BY MOUTH TWICE DAILY WITH MEALS AND 1           tab at noon    Medication is being filled for 1 time phoebe refill only due to:  Patient is due for DM check.     Please call and help schedule.  Thank you!    Alpesh Palumbo RN, BSN  [facility]  June 9, 2021

## 2021-07-03 ENCOUNTER — HEALTH MAINTENANCE LETTER (OUTPATIENT)
Age: 71
End: 2021-07-03

## 2021-07-14 ENCOUNTER — TELEPHONE (OUTPATIENT)
Dept: FAMILY MEDICINE | Facility: OTHER | Age: 71
End: 2021-07-14

## 2021-07-14 NOTE — TELEPHONE ENCOUNTER
Chata called and wanted you to know that Genaro came in today saying he needed another refill and brought up that his pills looked different.  The pharmacist looked at them and he was given the correct bottle but in side was 5 mg tables NOT 10 mg tablets.  His numbers have been running 190 instead of the usual 160.  He is doing fine but the pharmacy wanted you to know they corrected this and that got the next month with 10 mg tablets for free.

## 2021-09-01 ENCOUNTER — TRANSFERRED RECORDS (OUTPATIENT)
Dept: MULTI SPECIALTY CLINIC | Facility: CLINIC | Age: 71
End: 2021-09-01
Payer: COMMERCIAL

## 2021-09-01 LAB — RETINOPATHY: NORMAL

## 2021-09-05 DIAGNOSIS — Z13.220 SCREENING FOR HYPERLIPIDEMIA: Primary | ICD-10-CM

## 2021-09-05 DIAGNOSIS — E11.59 TYPE 2 DIABETES MELLITUS WITH OTHER CIRCULATORY COMPLICATION, WITHOUT LONG-TERM CURRENT USE OF INSULIN (H): ICD-10-CM

## 2021-09-07 RX ORDER — EMPAGLIFLOZIN 25 MG/1
TABLET, FILM COATED ORAL
Qty: 90 TABLET | Refills: 0 | Status: SHIPPED | OUTPATIENT
Start: 2021-09-07 | End: 2022-02-03

## 2021-09-07 NOTE — TELEPHONE ENCOUNTER
"Requested Prescriptions   Pending Prescriptions Disp Refills     JARDIANCE 25 MG TABS tablet [Pharmacy Med Name: JARDIANCE 25MG TABLETS] 90 tablet 0     Sig: TAKE 1 TABLET(25 MG) BY MOUTH DAILY       Sodium Glucose Co-Transport Inhibitor Agents Failed - 9/5/2021  3:39 AM        Failed - Patient has documented A1c within the specified period of time.     If HgbA1C is 8 or greater, it needs to be on file within the past 3 months.  If less than 8, must be on file within the past 6 months.     Recent Labs   Lab Test 11/30/20  1026   A1C 8.4*             Failed - Recent (6 mo) or future (30 days) visit within the authorizing provider's specialty     Patient had office visit in the last 6 months or has a visit in the next 30 days with authorizing provider or within the authorizing provider's specialty.  See \"Patient Info\" tab in inbasket, or \"Choose Columns\" in Meds & Orders section of the refill encounter.            Passed - No creatinine >1.4 or GFR <45 within the past 12 mos     Recent Labs   Lab Test 11/30/20  1026   GFRESTIMATED 58*   GFRESTBLACK 67       Recent Labs   Lab Test 11/30/20  1026   CR 1.23             Passed - Medication is active on med list        Passed - Patient is age 18 or older        Passed - Patient has documented normal Potassium within the last 12 mos.     Recent Labs   Lab Test 11/30/20  1026   POTASSIUM 5.0                  Routing refill request to provider for review/approval because:  Labs not current:  A1C  Patient needs to be seen because:  Not seen past 9 months    Andrew Bryant, RN, BSN        "

## 2021-09-07 NOTE — TELEPHONE ENCOUNTER
Due for appt. Nikki given, please schedule. (Phone, ov, or evisit as appropriate).  Mayelin Hernandez MD

## 2021-10-23 ENCOUNTER — HEALTH MAINTENANCE LETTER (OUTPATIENT)
Age: 71
End: 2021-10-23

## 2022-01-01 ENCOUNTER — TRANSFERRED RECORDS (OUTPATIENT)
Dept: MULTI SPECIALTY CLINIC | Facility: CLINIC | Age: 72
End: 2022-01-01

## 2022-01-01 LAB — RETINOPATHY: NEGATIVE

## 2022-02-03 ENCOUNTER — OFFICE VISIT (OUTPATIENT)
Dept: FAMILY MEDICINE | Facility: OTHER | Age: 72
End: 2022-02-03
Payer: COMMERCIAL

## 2022-02-03 VITALS
RESPIRATION RATE: 16 BRPM | WEIGHT: 203 LBS | SYSTOLIC BLOOD PRESSURE: 134 MMHG | BODY MASS INDEX: 27.26 KG/M2 | OXYGEN SATURATION: 97 % | TEMPERATURE: 97.6 F | DIASTOLIC BLOOD PRESSURE: 62 MMHG | HEART RATE: 83 BPM

## 2022-02-03 DIAGNOSIS — Z13.220 SCREENING FOR HYPERLIPIDEMIA: ICD-10-CM

## 2022-02-03 DIAGNOSIS — K76.0 FATTY LIVER: ICD-10-CM

## 2022-02-03 DIAGNOSIS — E66.01 MORBID OBESITY (H): ICD-10-CM

## 2022-02-03 DIAGNOSIS — E11.59 TYPE 2 DIABETES MELLITUS WITH OTHER CIRCULATORY COMPLICATIONS (H): ICD-10-CM

## 2022-02-03 DIAGNOSIS — E11.8 TYPE 2 DIABETES MELLITUS WITH COMPLICATION, WITHOUT LONG-TERM CURRENT USE OF INSULIN (H): ICD-10-CM

## 2022-02-03 DIAGNOSIS — Z87.891 PERSONAL HISTORY OF TOBACCO USE: ICD-10-CM

## 2022-02-03 DIAGNOSIS — N18.31 CHRONIC KIDNEY DISEASE, STAGE 3A (H): ICD-10-CM

## 2022-02-03 DIAGNOSIS — I25.10 CORONARY ARTERY DISEASE INVOLVING NATIVE CORONARY ARTERY OF NATIVE HEART WITHOUT ANGINA PECTORIS: Primary | ICD-10-CM

## 2022-02-03 DIAGNOSIS — E11.59 TYPE 2 DIABETES MELLITUS WITH OTHER CIRCULATORY COMPLICATION, WITHOUT LONG-TERM CURRENT USE OF INSULIN (H): ICD-10-CM

## 2022-02-03 DIAGNOSIS — R39.11 URINARY HESITANCY: ICD-10-CM

## 2022-02-03 DIAGNOSIS — I10 HTN, GOAL BELOW 130/80: ICD-10-CM

## 2022-02-03 DIAGNOSIS — I48.91 ATRIAL FIBRILLATION, UNSPECIFIED TYPE (H): ICD-10-CM

## 2022-02-03 DIAGNOSIS — I25.10 CORONARY ARTERY DISEASE INVOLVING NATIVE HEART WITHOUT ANGINA PECTORIS, UNSPECIFIED VESSEL OR LESION TYPE: ICD-10-CM

## 2022-02-03 DIAGNOSIS — E11.9 TYPE 2 DIABETES MELLITUS WITHOUT COMPLICATION, WITHOUT LONG-TERM CURRENT USE OF INSULIN (H): ICD-10-CM

## 2022-02-03 DIAGNOSIS — I10 HTN, GOAL BELOW 140/90: ICD-10-CM

## 2022-02-03 DIAGNOSIS — E78.5 HYPERLIPIDEMIA LDL GOAL <70: ICD-10-CM

## 2022-02-03 LAB
ALBUMIN SERPL-MCNC: 3.8 G/DL (ref 3.4–5)
ALP SERPL-CCNC: 74 U/L (ref 40–150)
ALT SERPL W P-5'-P-CCNC: 31 U/L (ref 0–70)
ANION GAP SERPL CALCULATED.3IONS-SCNC: 7 MMOL/L (ref 3–14)
AST SERPL W P-5'-P-CCNC: 21 U/L (ref 0–45)
BILIRUB SERPL-MCNC: 1.5 MG/DL (ref 0.2–1.3)
BUN SERPL-MCNC: 23 MG/DL (ref 7–30)
CALCIUM SERPL-MCNC: 9.6 MG/DL (ref 8.5–10.1)
CHLORIDE BLD-SCNC: 102 MMOL/L (ref 94–109)
CHOLEST SERPL-MCNC: 156 MG/DL
CO2 SERPL-SCNC: 24 MMOL/L (ref 20–32)
CREAT SERPL-MCNC: 0.99 MG/DL (ref 0.66–1.25)
CREAT UR-MCNC: 80 MG/DL
FASTING STATUS PATIENT QL REPORTED: NO
GFR SERPL CREATININE-BSD FRML MDRD: 80 ML/MIN/1.73M2
GLUCOSE BLD-MCNC: 387 MG/DL (ref 70–99)
HBA1C MFR BLD: 10.5 % (ref 0–5.6)
HDLC SERPL-MCNC: 42 MG/DL
LDLC SERPL CALC-MCNC: 76 MG/DL
MICROALBUMIN UR-MCNC: 97 MG/L
MICROALBUMIN/CREAT UR: 121.25 MG/G CR (ref 0–17)
NONHDLC SERPL-MCNC: 114 MG/DL
POTASSIUM BLD-SCNC: 4.7 MMOL/L (ref 3.4–5.3)
PROT SERPL-MCNC: 7.7 G/DL (ref 6.8–8.8)
SODIUM SERPL-SCNC: 133 MMOL/L (ref 133–144)
TRIGL SERPL-MCNC: 188 MG/DL

## 2022-02-03 PROCEDURE — 99214 OFFICE O/P EST MOD 30 MIN: CPT | Performed by: FAMILY MEDICINE

## 2022-02-03 PROCEDURE — 83036 HEMOGLOBIN GLYCOSYLATED A1C: CPT | Performed by: FAMILY MEDICINE

## 2022-02-03 PROCEDURE — 82043 UR ALBUMIN QUANTITATIVE: CPT | Performed by: FAMILY MEDICINE

## 2022-02-03 PROCEDURE — 80053 COMPREHEN METABOLIC PANEL: CPT | Performed by: FAMILY MEDICINE

## 2022-02-03 PROCEDURE — 36415 COLL VENOUS BLD VENIPUNCTURE: CPT | Performed by: FAMILY MEDICINE

## 2022-02-03 PROCEDURE — G0296 VISIT TO DETERM LDCT ELIG: HCPCS | Performed by: FAMILY MEDICINE

## 2022-02-03 PROCEDURE — 80061 LIPID PANEL: CPT | Performed by: FAMILY MEDICINE

## 2022-02-03 RX ORDER — GLIPIZIDE 10 MG/1
10 TABLET ORAL
Qty: 180 TABLET | Refills: 1 | Status: SHIPPED | OUTPATIENT
Start: 2022-02-03 | End: 2022-07-07

## 2022-02-03 RX ORDER — METOPROLOL TARTRATE 50 MG
50 TABLET ORAL 2 TIMES DAILY
Qty: 180 TABLET | Refills: 0 | Status: SHIPPED | OUTPATIENT
Start: 2022-02-03 | End: 2022-07-11

## 2022-02-03 RX ORDER — LISINOPRIL 2.5 MG/1
2.5 TABLET ORAL DAILY
Qty: 90 TABLET | Refills: 0 | Status: ON HOLD | OUTPATIENT
Start: 2022-02-03 | End: 2023-11-06

## 2022-02-03 RX ORDER — ROSUVASTATIN CALCIUM 40 MG/1
40 TABLET, COATED ORAL DAILY
Qty: 90 TABLET | Refills: 0 | Status: SHIPPED | OUTPATIENT
Start: 2022-02-03 | End: 2022-08-03

## 2022-02-03 ASSESSMENT — PAIN SCALES - GENERAL: PAINLEVEL: NO PAIN (0)

## 2022-02-03 NOTE — PROGRESS NOTES
Assessment & Plan       ICD-10-CM    1. Routine general medical examination at a health care facility  Z00.00    2. Coronary artery disease involving native coronary artery of native heart without angina pectoris  I25.10 rosuvastatin (CRESTOR) 40 MG tablet   3. Chronic kidney disease, stage 3a (H)  N18.31    4. Type 2 diabetes mellitus with other circulatory complication, without long-term current use of insulin (H)  E11.59 COMPREHENSIVE METABOLIC PANEL     HEMOGLOBIN A1C     Lipid panel reflex to direct LDL Fasting     Albumin Random Urine Quantitative with Creat Ratio     empagliflozin (JARDIANCE) 25 MG TABS tablet   5. HTN, goal below 130/80  I10    6. Hyperlipidemia LDL goal <70  E78.5 rosuvastatin (CRESTOR) 40 MG tablet   7. Coronary artery disease involving native heart without angina pectoris, unspecified vessel or lesion type  I25.10    8. Obesity (BMI 35.0-39.9) with comorbidity (H)  E66.01    9. Atrial fibrillation, unspecified type (H)  I48.91    10. Fatty liver  K76.0    11. Screening for hyperlipidemia  Z13.220 Lipid panel reflex to direct LDL Fasting   12. Type 2 diabetes mellitus without complication, without long-term current use of insulin (H)  E11.9 metFORMIN (GLUCOPHAGE) 500 MG tablet   13. Type 2 diabetes mellitus with complication, without long-term current use of insulin (H)  E11.8 glipiZIDE (GLUCOTROL) 10 MG tablet   14. Personal history of tobacco use  Z87.891 Prof Fee: Shared Decision Making Visit for Lung Cancer Screening   15. HTN, goal below 140/90  I10 metoprolol tartrate (LOPRESSOR) 50 MG tablet   16. Urinary hesitancy  R39.11      Patient had to change insurance to be able to see us.  His previous insurance also was denying his Jardiance use.  He was able to cover Ozempic which helped with weight loss though had some significant nausea and vomiting.  So he had stopped.  He has been without medications other than his Metformin and glipizide the last few months and his A1c is elevated  because of this.  He states he has been dealing with significant urgency in urination which is likely due to these high blood sugars.  Though he has had no troubles with guarding or maintaining flow which she had previously had and use Flomax for.  So we did discontinue the Flomax at this time.  Due to patient's cardiovascular disease we did talk to him about the emphasis of Jardiance is the best medication for his diabetes control and so we will resend this and see if we can get coverage.  If he cannot insulin is the next plan and this would require the diabetes educators to assist.  He is aware of this plan and will contact us if he cannot get the Jardiance though given his coronary artery disease this seems likely he should be able to get if appealed    Review of the result(s) of each unique test - a1c. microalb, lipid, bmp  32 minutes spent on the date of the encounter doing chart review, history and exam, documentation and further activities per the note        Return in about 3 months (around 5/3/2022) for diabetes.    Mayelin Hernandez MD, MD  [facility]    Jerome Lam is a 71 year old who presents for the following health issues     History of Present Illness       Diabetes:   He presents for follow up of diabetes.  He is checking home blood glucose one time daily. He checks blood glucose before meals.  Blood glucose is sometimes over 200 and never under 70. He is aware of hypoglycemia symptoms including shakiness. He is concerned about blood sugar frequently over 200.  He is having blurry vision. The patient has had a diabetic eye exam in the last 12 months. Eye exam performed on 09/2021. Location of last eye exam [facility].        He eats 0-1 servings of fruits and vegetables daily.He consumes 0 sweetened beverage(s) daily.He exercises with enough effort to increase his heart rate 9 or less minutes per day.  He exercises with enough effort to increase his heart rate 3 or  less days per week.   He is taking medications regularly.       Diabetes Follow-up    How often are you checking your blood sugar? One time daily  What time of day are you checking your blood sugars (select all that apply)?  morning  Have you had any blood sugars above 200?  Yes everyday  Have you had any blood sugars below 70?  no    What symptoms do you notice when your blood sugar is low?  Shaky    What concerns do you have today about your diabetes? None and Other: cant keep under control     Do you have any of these symptoms? (Select all that apply)  No numbness or tingling in feet.  No redness, sores or blisters on feet.  No complaints of excessive thirst.  No reports of blurry vision.  No significant changes to weight.    Have you had a diabetic eye exam in the last 12 months? Yes- Date of last eye exam: November,  Location: [facility]        BP Readings from Last 2 Encounters:   02/03/22 134/62   12/02/20 120/60     Hemoglobin A1C POCT (%)   Date Value   11/30/2020 8.4 (H)   01/27/2020 9.7 (H)     Hemoglobin A1C (%)   Date Value   02/03/2022 10.5 (H)     LDL Cholesterol Calculated (mg/dL)   Date Value   11/30/2020 82   10/30/2019 34                   Review of Systems   Constitutional, HEENT, cardiovascular, pulmonary, GI, , musculoskeletal, neuro, skin, endocrine and psych systems are negative, except as otherwise noted.      Objective    /62   Pulse 83   Temp 97.6  F (36.4  C) (Temporal)   Resp 16   Wt 92.1 kg (203 lb)   SpO2 97%   BMI 27.26 kg/m    Body mass index is 27.26 kg/m .  Physical Exam   GENERAL: healthy, alert and no distress  RESP: lungs clear to auscultation - no rales, rhonchi or wheezes  CV: regular rate and rhythm, normal S1 S2, no S3 or S4, no murmur, click or rub, no peripheral edema and peripheral pulses strong  ABDOMEN: soft, nontender, no hepatosplenomegaly, no masses and bowel sounds normal  MS: no gross musculoskeletal defects noted, no edema  SKIN: no  suspicious lesions or rashes  NEURO: Normal strength and tone, mentation intact and speech normal  PSYCH: mentation appears normal, affect normal/bright

## 2022-02-03 NOTE — PROGRESS NOTES
Lung Cancer Screening Shared Decision Making Visit     Genaro Barbosa is not eligible for lung cancer screening on the basis of the information provided in my signed lung cancer screening order. Genaro's smoking history is below the threshold and so it is not recommended.    Patient is not currently a smoker and so we did not discuss that the only way to prevent lung cancer is to not smoke. Smoking cessation counseling was not given.    I did not offer risk estimation using a calculator such as this one:    ShouldIScreen

## 2022-02-03 NOTE — PROGRESS NOTES
SUBJECTIVE:   CC: Genaro Barbosa is an 71 year old woman who presents for preventive health visit.     {Split Bill scripting  The purpose of this visit is to discuss your medical history and prevent health problems before you are sick. You may be responsible for a co-pay, coinsurance, or deductible if your visit today includes services such as checking on a sore throat, having an x-ray or lab test, or treating and evaluating a new or existing condition :226224}  {Patient advised of split billing (Optional):112115}  HPI  {Add if <65 person on Medicare  - Required Questions (Optional):328305}  {Outside tests to abstract? :933475}    {additional problems to add (Optional):450283}    Today's PHQ-2 Score:   PHQ-2 ( 1999 [employer]) 5/7/2020   Q1: Little interest or pleasure in doing things 1   Q2: Feeling down, depressed or hopeless 0   PHQ-2 Score 1   PHQ-2 Total Score (12-17 Years)- Positive if 3 or more points; Administer PHQ-A if positive 1   Q1: Little interest or pleasure in doing things -   Q2: Feeling down, depressed or hopeless -   PHQ-2 Score -       Abuse: Current or Past (Physical, Sexual or Emotional) - { :262762}  Do you feel safe in your environment? { :042392}        Social History     Tobacco Use     Smoking status: Former Smoker     Smokeless tobacco: Never Used     Tobacco comment: quit age 26   Substance Use Topics     Alcohol use: Yes     Comment: approximately 12 beers per week     {Rooming Staff- Complete this question if Prescreen response is not shown below for today's visit. If you drink alcohol do you typically have >3 drinks per day or >7 drinks per week? (Optional):804878}    Alcohol Use 7/11/2019   Prescreen: >3 drinks/day or >7 drinks/week? Not Applicable   {add AUDIT responses (Optional) (A score of 7 for adult men is an indication of hazardous drinking; a score of 8 or more is an indication of an alcohol use disorder.  A score of 7 or more for adult women is an indication of hazardous  "drinking or an alchohol use disorder):640609}    Reviewed orders with patient.  Reviewed health maintenance and updated orders accordingly - { :777574::\"Yes\"}  {Chronicprobdata (optional):241108}    Breast Cancer Screening:        History of abnormal Pap smear: { :131725}     Reviewed and updated as needed this visit by clinical staff                Reviewed and updated as needed this visit by Provider               {HISTORY OPTIONS (Optional):088843}    Review of Systems  {FEMALE ROS (Optional):572166}     OBJECTIVE:   There were no vitals taken for this visit.  Physical Exam  {Exam Choices (Optional):206274}    {Diagnostic Test Results (Optional):703627::\"Diagnostic Test Results:\",\"Labs reviewed in Epic\"}    ASSESSMENT/PLAN:   {Diag Picklist:313151}    {Patient advised of split billing (Optional):854650}    COUNSELING:  {FEMALE COUNSELING MESSAGES:208954::\"Reviewed preventive health counseling, as reflected in patient instructions\"}    Estimated body mass index is 28.73 kg/m  as calculated from the following:    Height as of 12/2/20: 1.838 m (6' 0.36\").    Weight as of 12/2/20: 97.1 kg (214 lb).    {Weight Management Plan (ACO) Complete if BMI is abnormal-  Ages 18-64  BMI >24.9.  Age 65+ with BMI <23 or >30 (Optional):546976}    He reports that he has quit smoking. He has never used smokeless tobacco.      Counseling Resources:  ATP IV Guidelines  Pooled Cohorts Equation Calculator  Breast Cancer Risk Calculator  BRCA-Related Cancer Risk Assessment: FHS-7 Tool  FRAX Risk Assessment  ICSI Preventive Guidelines  Dietary Guidelines for Americans, 2010  [email or web address]'s MyPlate  ASA Prophylaxis  Lung CA Screening    Mayelin Hernandez MD, MD  [facility]"

## 2022-02-12 ENCOUNTER — HEALTH MAINTENANCE LETTER (OUTPATIENT)
Age: 72
End: 2022-02-12

## 2022-02-24 ENCOUNTER — OFFICE VISIT (OUTPATIENT)
Dept: FAMILY MEDICINE | Facility: OTHER | Age: 72
End: 2022-02-24
Payer: COMMERCIAL

## 2022-02-24 VITALS
TEMPERATURE: 96.8 F | WEIGHT: 197 LBS | HEART RATE: 80 BPM | DIASTOLIC BLOOD PRESSURE: 70 MMHG | RESPIRATION RATE: 16 BRPM | BODY MASS INDEX: 26.45 KG/M2 | OXYGEN SATURATION: 98 % | SYSTOLIC BLOOD PRESSURE: 130 MMHG

## 2022-02-24 DIAGNOSIS — R73.09 ELEVATED GLUCOSE: ICD-10-CM

## 2022-02-24 DIAGNOSIS — E11.29 MICROALBUMINURIA DUE TO TYPE 2 DIABETES MELLITUS (H): ICD-10-CM

## 2022-02-24 DIAGNOSIS — R30.0 DYSURIA: Primary | ICD-10-CM

## 2022-02-24 DIAGNOSIS — E11.9 TYPE 2 DIABETES, HBA1C GOAL < 7% (H): ICD-10-CM

## 2022-02-24 DIAGNOSIS — R80.9 MICROALBUMINURIA DUE TO TYPE 2 DIABETES MELLITUS (H): ICD-10-CM

## 2022-02-24 LAB
ALBUMIN UR-MCNC: ABNORMAL MG/DL
APPEARANCE UR: CLEAR
BILIRUB UR QL STRIP: NEGATIVE
COLOR UR AUTO: YELLOW
GLUCOSE UR STRIP-MCNC: >=1000 MG/DL
HGB UR QL STRIP: ABNORMAL
KETONES UR STRIP-MCNC: ABNORMAL MG/DL
LEUKOCYTE ESTERASE UR QL STRIP: NEGATIVE
NITRATE UR QL: NEGATIVE
PH UR STRIP: 5 [PH] (ref 5–7)
RBC #/AREA URNS AUTO: ABNORMAL /HPF
SP GR UR STRIP: 1.01 (ref 1–1.03)
SQUAMOUS #/AREA URNS AUTO: ABNORMAL /LPF
UROBILINOGEN UR STRIP-ACNC: 0.2 E.U./DL
WBC #/AREA URNS AUTO: ABNORMAL /HPF

## 2022-02-24 PROCEDURE — 81001 URINALYSIS AUTO W/SCOPE: CPT | Performed by: PHYSICIAN ASSISTANT

## 2022-02-24 PROCEDURE — 99214 OFFICE O/P EST MOD 30 MIN: CPT | Mod: GW | Performed by: PHYSICIAN ASSISTANT

## 2022-02-24 ASSESSMENT — PAIN SCALES - GENERAL: PAINLEVEL: NO PAIN (0)

## 2022-02-24 NOTE — PROGRESS NOTES
Assessment & Plan     ICD-10-CM    1. Dysuria  R30.0 UA Macro with Reflex to Micro and Culture - lab collect     UA Macro with Reflex to Micro and Culture - lab collect     Urine Microscopic   2. Microalbuminuria due to type 2 diabetes mellitus (H)  E11.29     R80.9    3. Elevated glucose  R73.09    4. Type 2 diabetes, HbA1c goal < 7% (H)  E11.9      - Patient here because of increased urination   - Discussed this with PCP on 2/3/22 where was found to have A1C of 10.5      In this visit was on Metformin and Glipizide, but had insurance issues with Ozempic and Jardiance, Jardiance was refilled      Patient reports was taking these and BG's 140-150s but then this urine seemed to get worse so stopped it and his Flomax   - Discussed with patient that this urine did not show infection but did show >1000 glucose and ketones   - Discussed this is symptom of uncontrolled diabetes   - Recommend re-start Jardiance, continue to hold Flomax      Discussed could try different medication as little known side effect (<3% of people per UTD report increased urination with Jardiance)      Patient states will just re-start Jardiance   - At this time, I do not see any other option for him but blood sugar control   - If persists and BG's come down, recommend checking prostate and getting PSA as I do not see a PSA since 2016     Review of the result(s) of each unique test - See list       Today - UA        2/3/22 - A1C, microalbumin, lipid, cmp        12/12/16 - PSA   Diagnosis or treatment significantly limited by social determinants of health - None     20 minutes spent on the date of the encounter doing chart review, history and exam, documentation and further activities as noted above    The patient indicates understanding of these issues and agrees with the plan.    Follow up: 2 weeks if not improving     DEISY Selby [facility]    Jerome Lam is a 71 year old who presents for  the following health issues     UTI      Genitourinary - Male  Onset/Duration: 3 weeks  Description:   Dysuria (painful urination): no}  Hematuria (blood in urine): no  Frequency: YES- every 20 minute  Waking at night to urinate: YES  Hesitancy (delay in urine): YES  Retention (unable to empty): no  Decrease in urinary flow: no  Incontinence: YES  Progression of Symptoms:  same  Accompanying Signs & Symptoms:  Fever: no  Back/Flank pain: no  Urethral discharge: no  Testicle lumps/masses/pain: no  Nausea and/or vomiting: no  Abdominal pain: no  History:   History of frequent UTI s: no  History of kidney stones: no  History of hernias: no  Personal or Family history of Prostate problems: no  Sexually active: no  Precipitating or alleviating factors: None  Therapies tried and outcome: none      -  Was taking jardiance      Sugars were 140-150      So stopped the Jardiance - now 220-230   - stopped flomax, now waits to urinate           Review of Systems   Constitutional, HEENT, cardiovascular, pulmonary, gi and gu systems are negative, except as otherwise noted.      Objective    /70   Pulse 80   Temp 96.8  F (36  C) (Temporal)   Resp 16   Wt 89.4 kg (197 lb)   SpO2 98%   BMI 26.45 kg/m    Body mass index is 26.45 kg/m .  Physical Exam    GENERAL APPEARANCE: healthy, alert and no distress  EYES: Eyes grossly normal to inspection, PERRLA, conjunctivae and sclerae without injection or discharge, EOM intact   RESP: Lungs clear to auscultation - no rales, rhonchi or wheezes    CV: Regular rates and rhythm, normal S1 S2, no S3 or S4, no murmur, click or rub, no peripheral edema and peripheral pulses strong and symmetric bilaterally   MS: No musculoskeletal defects are noted and gait is age appropriate without ataxia   SKIN: No suspicious lesions or rashes, hydration status appears adeuqate with normal skin turgor   PSYCH: Alert and oriented x3; speech- coherent , normal rate and volume; able to articulate  logical thoughts, able to abstract reason, no tangential thoughts, no hallucinations or delusions, mentation appears normal, Mood is euthymic. Affect is appropriate for this mood state and bright. Thought content is free of suicidal ideation, hallucinations, and delusions. Dress is adequate and upkept. Eye contact is good during conversation.       Diagnostics;   Results for orders placed or performed in visit on 02/24/22 (from the past 24 hour(s))   UA Macro with Reflex to Micro and Culture - lab collect    Specimen: Urine, Clean Catch   Result Value Ref Range    Color Urine Yellow Colorless, Straw, Light Yellow, Yellow    Appearance Urine Clear Clear    Glucose Urine >=1000 (A) Negative mg/dL    Bilirubin Urine Negative Negative    Ketones Urine Trace (A) Negative mg/dL    Specific Gravity Urine 1.015 1.003 - 1.035    Blood Urine Trace (A) Negative    pH Urine 5.0 5.0 - 7.0    Protein Albumin Urine Trace (A) Negative mg/dL    Urobilinogen Urine 0.2 0.2, 1.0 E.U./dL    Nitrite Urine Negative Negative    Leukocyte Esterase Urine Negative Negative   Urine Microscopic   Result Value Ref Range    RBC Urine 0-2 0-2 /HPF /HPF    WBC Urine 0-5 0-5 /HPF /HPF    Squamous Epithelials Urine Few (A) None Seen /LPF    Narrative    Urine Culture not indicated

## 2022-03-08 ENCOUNTER — TELEPHONE (OUTPATIENT)
Dept: FAMILY MEDICINE | Facility: OTHER | Age: 72
End: 2022-03-08
Payer: COMMERCIAL

## 2022-03-08 DIAGNOSIS — E11.9 TYPE 2 DIABETES, HBA1C GOAL < 7% (H): Primary | ICD-10-CM

## 2022-03-08 NOTE — TELEPHONE ENCOUNTER
"Patient calling in regards to glucose monitor.    States his monitor is all over the place and goes from 100's to 200's to 300's all within minutes. States \"this thing is all messed up.\"    Requesting a new monitor be sent to pharmacy.    Has the [facility]    Pharmacy: [facility]  March 8, 2022    "

## 2022-03-21 ENCOUNTER — TELEPHONE (OUTPATIENT)
Dept: FAMILY MEDICINE | Facility: OTHER | Age: 72
End: 2022-03-21
Payer: COMMERCIAL

## 2022-03-21 DIAGNOSIS — E11.59 TYPE 2 DIABETES MELLITUS WITH OTHER CIRCULATORY COMPLICATION, WITHOUT LONG-TERM CURRENT USE OF INSULIN (H): Primary | ICD-10-CM

## 2022-03-21 NOTE — TELEPHONE ENCOUNTER
Patient needs better control, can clarlify meds and make sure heis taking the jardiance now first.  But will also refer to diabetes ed to see if they can help adjust meds more acutely given his blood sugar numbers.  Mayelin Hernandez MD

## 2022-03-21 NOTE — TELEPHONE ENCOUNTER
"Pt states that his diabetes is \"nuts\". He states that he needs help with this. Wants to know what he should do?    Pt has been getting readings that are 280, 290, 300. Pt states that he has been checking his glucose more frequently recently due to the numbers he is getting. For example, pt states that all he had this morning was coffee. 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 Pt has been consistently taking his oral diabetes medications. No changes to diet. Pt has been exercising daily for 10 min on a treadmill. He avoids carbs. He drinks a couple bottles of water per day. No changes to stress level. Pt states that when his glucose is higher, his only other symptom has been increased/excessive urination. Denies dizziness, lightheadedness, shakiness, etc.     Please call pt back at the number listed. Per pt, ok to leave a detailed message on that number.     GLORIA BarraganN, RN, PHN  Registered Nurse-Clinic Triage  [facility]/[place]  3/21/2022 at 10:35 AM    "

## 2022-03-22 ENCOUNTER — TELEPHONE (OUTPATIENT)
Dept: FAMILY MEDICINE | Facility: OTHER | Age: 72
End: 2022-03-22
Payer: COMMERCIAL

## 2022-03-22 NOTE — TELEPHONE ENCOUNTER
Called and left detailed message for pt, as he requested, with information from provider. Gave number to call and schedule with diabetic education.     Asked if pt is taking jardiance as directed. Although, note that he informed RN yesterday that he is taking all of his diabetic medications as directed. Pt to call back with response and/or questions.    GLORIA BarraganN, RN, PHN  Registered Nurse-Clinic Triage  [facility]/[place]  3/22/2022 at 10:47 AM

## 2022-03-22 NOTE — TELEPHONE ENCOUNTER
Diabetes Education Scheduling Outreach #1:    Call to patient to schedule. Left message with phone number to call to schedule.    Plan for 2nd outreach attempt within 1 week.    Marimar Perera  [facility]  Diabetes and Nutrition Scheduling

## 2022-03-24 NOTE — TELEPHONE ENCOUNTER
Left message on answering machine for patient to call back and ask to speak with clinic RN at [phone].  Kat Kimbrough, RN

## 2022-03-25 ENCOUNTER — MYC MEDICAL ADVICE (OUTPATIENT)
Dept: FAMILY MEDICINE | Facility: OTHER | Age: 72
End: 2022-03-25
Payer: COMMERCIAL

## 2022-03-25 NOTE — TELEPHONE ENCOUNTER
Patient has not returned call to triage nurse. No appointment scheduled at this time with diabetic ed.     Sent my chart message on 3/25/22.    Indy CASTRON, RN    No

## 2022-03-25 NOTE — TELEPHONE ENCOUNTER
Diabetes Education Scheduling Outreach #2:    [employer] message sent to patient requesting to call to schedule.    [facility]  Diabetes and Nutrition Scheduling

## 2022-03-28 NOTE — TELEPHONE ENCOUNTER
Patient says he can be seen around 10:30. Routing to pool to assist in scheduling    Indy CASTRON, RN

## 2022-03-28 NOTE — TELEPHONE ENCOUNTER
Natalie/Marimar,    Can you please reach out to patient again to schedule an appt. Looks like [place] is closest location and I do have a hold on a 4:15pm spot there with Jo-Ann tomorrow.    Thanks!    Oneyda Degroot RN, [facility]

## 2022-03-29 NOTE — TELEPHONE ENCOUNTER
Voicemail left yesterday for pt to call to schedule appt. We are usually scheduling out several weeks depending on location and visit type. Will await return call.    Oneyda Degroot RN, [facility]

## 2022-03-30 NOTE — TELEPHONE ENCOUNTER
Sent pt a [employer] message about how to schedule an appt and again left the appt line phone number.    Oneyda Degroot RN, [facility]

## 2022-07-18 ENCOUNTER — TELEPHONE (OUTPATIENT)
Dept: FAMILY MEDICINE | Facility: OTHER | Age: 72
End: 2022-07-18

## 2022-07-18 ENCOUNTER — OFFICE VISIT (OUTPATIENT)
Dept: FAMILY MEDICINE | Facility: OTHER | Age: 72
End: 2022-07-18
Payer: COMMERCIAL

## 2022-07-18 ENCOUNTER — PATIENT OUTREACH (OUTPATIENT)
Dept: CARE COORDINATION | Facility: CLINIC | Age: 72
End: 2022-07-18

## 2022-07-18 VITALS
HEIGHT: 72 IN | SYSTOLIC BLOOD PRESSURE: 130 MMHG | DIASTOLIC BLOOD PRESSURE: 74 MMHG | OXYGEN SATURATION: 98 % | RESPIRATION RATE: 20 BRPM | TEMPERATURE: 98.6 F | HEART RATE: 74 BPM | WEIGHT: 199 LBS | BODY MASS INDEX: 26.95 KG/M2

## 2022-07-18 DIAGNOSIS — Z12.11 SCREEN FOR COLON CANCER: ICD-10-CM

## 2022-07-18 DIAGNOSIS — E11.59 TYPE 2 DIABETES MELLITUS WITH OTHER CIRCULATORY COMPLICATION, WITHOUT LONG-TERM CURRENT USE OF INSULIN (H): ICD-10-CM

## 2022-07-18 DIAGNOSIS — Z00.00 ENCOUNTER FOR MEDICARE ANNUAL WELLNESS EXAM: Primary | ICD-10-CM

## 2022-07-18 DIAGNOSIS — K21.9 GASTROESOPHAGEAL REFLUX DISEASE WITHOUT ESOPHAGITIS: ICD-10-CM

## 2022-07-18 DIAGNOSIS — I25.118 CORONARY ARTERY DISEASE OF NATIVE ARTERY OF NATIVE HEART WITH STABLE ANGINA PECTORIS (H): ICD-10-CM

## 2022-07-18 DIAGNOSIS — F43.9 STRESS: ICD-10-CM

## 2022-07-18 DIAGNOSIS — Z59.71 INSURANCE COVERAGE PROBLEMS: ICD-10-CM

## 2022-07-18 DIAGNOSIS — E11.59 TYPE 2 DIABETES MELLITUS WITH OTHER CIRCULATORY COMPLICATION, WITHOUT LONG-TERM CURRENT USE OF INSULIN (H): Primary | ICD-10-CM

## 2022-07-18 DIAGNOSIS — I73.9 PERIPHERAL VASCULAR DISEASE, UNSPECIFIED (H): ICD-10-CM

## 2022-07-18 LAB
BASOPHILS # BLD AUTO: 0 10E3/UL (ref 0–0.2)
BASOPHILS NFR BLD AUTO: 1 %
EOSINOPHIL # BLD AUTO: 0.5 10E3/UL (ref 0–0.7)
EOSINOPHIL NFR BLD AUTO: 6 %
ERYTHROCYTE [DISTWIDTH] IN BLOOD BY AUTOMATED COUNT: 13.2 % (ref 10–15)
HBA1C MFR BLD: 11.7 % (ref 0–5.6)
HCT VFR BLD AUTO: 41.2 % (ref 40–53)
HGB BLD-MCNC: 14 G/DL (ref 13.3–17.7)
LYMPHOCYTES # BLD AUTO: 1.4 10E3/UL (ref 0.8–5.3)
LYMPHOCYTES NFR BLD AUTO: 17 %
MCH RBC QN AUTO: 30.5 PG (ref 26.5–33)
MCHC RBC AUTO-ENTMCNC: 34 G/DL (ref 31.5–36.5)
MCV RBC AUTO: 90 FL (ref 78–100)
MONOCYTES # BLD AUTO: 0.9 10E3/UL (ref 0–1.3)
MONOCYTES NFR BLD AUTO: 11 %
NEUTROPHILS # BLD AUTO: 5.6 10E3/UL (ref 1.6–8.3)
NEUTROPHILS NFR BLD AUTO: 66 %
PLATELET # BLD AUTO: 226 10E3/UL (ref 150–450)
RBC # BLD AUTO: 4.59 10E6/UL (ref 4.4–5.9)
WBC # BLD AUTO: 8.5 10E3/UL (ref 4–11)

## 2022-07-18 PROCEDURE — 83036 HEMOGLOBIN GLYCOSYLATED A1C: CPT | Performed by: PHYSICIAN ASSISTANT

## 2022-07-18 PROCEDURE — 85025 COMPLETE CBC W/AUTO DIFF WBC: CPT | Performed by: PHYSICIAN ASSISTANT

## 2022-07-18 PROCEDURE — 99207 PR FOOT EXAM NO CHARGE: CPT | Performed by: PHYSICIAN ASSISTANT

## 2022-07-18 PROCEDURE — G0439 PPPS, SUBSEQ VISIT: HCPCS | Performed by: PHYSICIAN ASSISTANT

## 2022-07-18 PROCEDURE — 99214 OFFICE O/P EST MOD 30 MIN: CPT | Mod: 25 | Performed by: PHYSICIAN ASSISTANT

## 2022-07-18 PROCEDURE — 36415 COLL VENOUS BLD VENIPUNCTURE: CPT | Performed by: PHYSICIAN ASSISTANT

## 2022-07-18 RX ORDER — INSULIN GLARGINE-YFGN 100 [IU]/ML
10 INJECTION, SOLUTION SUBCUTANEOUS DAILY
Qty: 3 ML | Refills: 0 | Status: SHIPPED | OUTPATIENT
Start: 2022-07-18 | End: 2022-07-19

## 2022-07-18 ASSESSMENT — PAIN SCALES - GENERAL: PAINLEVEL: NO PAIN (0)

## 2022-07-18 NOTE — LETTER
[facility] CARE COORDINATION  [address] [facility] [address]    July 18, 2022    Genaro Barbosa  [address] [address] [place] [id number]      Dear Genaro,        I am a clinic community health worker who works with Mayelin Hernandez MD, MD with the [facility]. I wanted to thank you for spending the time to talk with me.  Below is a description of clinic care coordination and how I can further assist you.       The clinic care coordination team is made up of a registered nurse, , financial resource worker and community health worker who understand the health care system. The goal of clinic care coordination is to help you manage your health and improve access to the health care system. Our team works alongside your provider to assist you in determining your health and social needs. We can help you obtain health care and community resources, providing you with necessary information and education. We can work with you through any barriers and develop a care plan that helps coordinate and strengthen the communication between you and your care team.    Please feel free to contact [facility] at [phone] with any questions or concerns regarding care coordination and what we can offer.      We are focused on providing you with the highest-quality healthcare experience possible.    Sincerely,     MEG Valverde  Connected Care Resource Center  [facility]

## 2022-07-18 NOTE — TELEPHONE ENCOUNTER
Insulin Glargine-yfgn (SEMGLEE, YFARTUR,) 100 UNIT/ML SOPN    Pharmacy message: Insurance prefers lantus, can we switch? Thanks    [facility]

## 2022-07-18 NOTE — PROGRESS NOTES
Assessment & Plan   Encounter for medicare annual wellness exam  Patient was given education on the benefits of exercise, healthy diet, regular sleep habits.     Type 2 diabetes mellitus with other circulatory complication, without long-term current use of insulin (H)  Patient was found to have A1c at 10.5% this winter 2022. He was been on various medications which have been discontinued due to intolerance or cost. Currently taking glipizide and metformin. Unfortunately, the A1c has continued to increase. The patient was open to starting insulin today. I will send this out to the pharmacy for him to begin. Follow up in 3 months to recheck the sugars.   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 His spouse had a CVA in May 2022 and is currently participating in rehab through [facility] in [place]. He says that the cost for this is $400/day. He has not looked into whether there are any supplement programs which he or his spouse may qualify for. He will connect with care coordination to discuss resources.   - Primary Care - Care Coordination Referral; Future    Peripheral vascular disease, unspecified (H)  No new concerns. Patient is trying to remain as active as able. He will continue aspirin without change.     Coronary artery disease of native artery of native heart with stable angina pectoris (H)  Patient follows with cardiologist through the [facility] [place] with [facility].     Gastroesophageal reflux disease without esophagitis  He will continue the PPI without change. Advised that he continue to monitor for trigger foods/drinks.   - omeprazole (PRILOSEC) 20 MG  capsule;  "Take 1 capsule (20 mg) by mouth daily (take 30 minutes prior to a meal)    Screen for colon cancer  Placed referral, patient will schedule at his convenience.   - Fecal colorectal cancer screen FIT - Future (S+30); Future  - Fecal colorectal cancer screen FIT - Future (S+30)     BMI:   Estimated body mass index is 26.72 kg/m  as calculated from the following:    Height as of this encounter: 1.838 m (6' 0.36\").    Weight as of this encounter: 90.3 kg (199 lb).   Weight management plan: Discussed healthy diet and exercise guidelines      Return in about 3 months (around 10/18/2022) for Diabetes Recheck.    Rigo Rivas PA-C  [facility]    Jerome Lam is a 72 year old, presenting for the following health issues:  Diabetes      History of Present Illness       Diabetes:   He presents for follow up of diabetes.  He is not checking blood glucose. He is concerned about blood sugar frequently over 200.  He is having blurry vision and weight loss.           Patient is a 72 year old male who presents today for follow up on diabetes. He informs me that he has been under increased stress over the past few months. He informs me that he moved from a home in [place] to an apartment in [place]. The new apartment is on the 3rd floor, but he does not have to take the steps as there is an elevator available. He informs me that his spouse was diagnosed with a stroke in May 2022 and has been receiving rehabilitation services at [facility] in [place]. The patient is quite worried about her. He says that the cost of the [place] services is $400/day. This is largely due to changes in their insurance coverage. 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 He says that he does not have much for savings due to spending quite a bit to pursue an automobile racing career. 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     He says that he taking glipizide and metformin, did not tolerate the jardiance due to increased need to urinate and cost. He has been checking his sugars which he reports have been above 200 pretty consistently. The patient says that he has been skipping meals during the day, often only eating in the evening. This was discouraged as skipping earlier in the day will make it harder to control later on. We discussed, instead, healthy food options for light snacks throughout the day.      Annual Wellness Visit    Patient has been advised of split billing requirements and indicates understanding: Yes     Are you in the first 12 months of your Medicare Part B coverage?  No    Physical Health:    In general, how would you rate your overall physical health? good    Outside of work, how many days during the week do you exercise?none    Outside of work, approximately how many minutes a day do you exercise?not applicable  If you drink alcohol do you typically have >3 drinks per day or >7 drinks per week? Yes - AUDIT SCORE:       No flowsheet data found.    Do you usually eat at least 4 servings of fruit and vegetables a day, include whole grains & fiber and avoid regularly eating high fat or \"junk\" foods? NO    Do you have any problems taking medications regularly? No    Do you have any side effects from medications? none    Needs assistance for the following daily activities: no assistance needed    Which of the following safety concerns are present in your home?  Installing grab bars     Hearing impairment: No    In the past 6 months, have you been bothered by leaking of urine? no    Mental Health:    In general, how would you rate your overall mental or emotional health? excellent  PHQ-2 Score: 0    Do you feel safe in your environment? Yes    Have you ever done Advance Care Planning? (For example, a Health Directive, POLST, or a discussion with a medical provider or your loved ones " "about your wishes)? No, advance care planning information given to patient to review.  Advanced care planning was discussed at today's visit.    Fall risk:  Fallen 2 or more times in the past year?: No  Any fall with injury in the past year?: No    Cognitive Screenin) Repeat 3 items (Leader, Season, Table)    2) Clock draw: NORMAL  3) 3 item recall: Recalls 2 objects   Results: NORMAL clock, 1-2 items recalled: COGNITIVE IMPAIRMENT LESS LIKELY    Mini-CogTM Copyright S Shane. Licensed by the author for use in [facility] [employer]; reprinted with permission (jay@[facility]). All rights reserved.      Do you have sleep apnea, excessive snoring or daytime drowsiness?: no    Current providers sharing in care for this patient include:   Patient Care Team:  Mayelin Hernandez MD as PCP - General (Family Medicine)  Mayelin Hernandez MD as Assigned PCP    Patient has been advised of split billing requirements and indicates understanding: Yes      Review of Systems   Constitutional, HEENT, cardiovascular, pulmonary, gi and gu systems are negative, except as otherwise noted.      Objective    /74 (BP Location: Right arm, Patient Position: Sitting, Cuff Size: Adult Regular)   Pulse 74   Temp 98.6  F (37  C) (Temporal)   Resp 20   Ht 1.838 m (6' 0.36\")   Wt 90.3 kg (199 lb)   SpO2 98%   BMI 26.72 kg/m    Body mass index is 26.72 kg/m .  Physical Exam   GENERAL: healthy, alert and no distress  EYES: Eyes grossly normal to inspection, PERRL and conjunctivae and sclerae normal  HENT: ear canals and TM's normal, nose and mouth without ulcers or lesions  NECK: no adenopathy, no asymmetry, masses, or scars and thyroid normal to palpation  RESP: lungs clear to auscultation - no rales, rhonchi or wheezes  CV: regular rate and rhythm, normal S1 S2, no S3 or S4, no murmur, click or rub, no peripheral edema and peripheral pulses strong  ABDOMEN: soft, nontender, no hepatosplenomegaly, no masses and bowel sounds " normal  MS: no gross musculoskeletal defects noted, no edema  NEURO: Normal strength and tone, mentation intact and speech normal  PSYCH: mentation appears normal, affect normal/bright  Diabetic foot exam: normal DP and PT pulses, no trophic changes or ulcerative lesions and normal sensory exam    Results for orders placed or performed in visit on 07/18/22 (from the past 24 hour(s))   Hemoglobin A1c   Result Value Ref Range    Hemoglobin A1C 11.7 (H) 0.0 - 5.6 %   CBC with platelets and differential    Narrative    The following orders were created for panel order CBC with platelets and differential.  Procedure                               Abnormality         Status                     ---------                               -----------         ------                     CBC with platelets and d...[[id number]]                      Final result                 Please view results for these tests on the individual orders.   CBC with platelets and differential   Result Value Ref Range    WBC Count 8.5 4.0 - 11.0 10e3/uL    RBC Count 4.59 4.40 - 5.90 10e6/uL    Hemoglobin 14.0 13.3 - 17.7 g/dL    Hematocrit 41.2 40.0 - 53.0 %    MCV 90 78 - 100 fL    MCH 30.5 26.5 - 33.0 pg    MCHC 34.0 31.5 - 36.5 g/dL    RDW 13.2 10.0 - 15.0 %    Platelet Count 226 150 - 450 10e3/uL    % Neutrophils 66 %    % Lymphocytes 17 %    % Monocytes 11 %    % Eosinophils 6 %    % Basophils 1 %    Absolute Neutrophils 5.6 1.6 - 8.3 10e3/uL    Absolute Lymphocytes 1.4 0.8 - 5.3 10e3/uL    Absolute Monocytes 0.9 0.0 - 1.3 10e3/uL    Absolute Eosinophils 0.5 0.0 - 0.7 10e3/uL    Absolute Basophils 0.0 0.0 - 0.2 10e3/uL

## 2022-07-18 NOTE — PROGRESS NOTES
Clinic Care Coordination Contact  Community Health Worker Initial Outreach            Patient accepts CC: No, due to the patient stating that he was not needing assistance at this time due to the fact that he talked to the rehab facility and they are able to help set up home care. The patient stated that he would reach back out in the future if needs arise. Patient will be sent Care Coordination introduction letter for future reference.     MEG Valverde  [phone]  [facility]

## 2022-07-20 PROCEDURE — 82274 ASSAY TEST FOR BLOOD FECAL: CPT | Performed by: PHYSICIAN ASSISTANT

## 2022-07-22 LAB — HEMOCCULT STL QL IA: NEGATIVE

## 2022-07-22 NOTE — RESULT ENCOUNTER NOTE
Mr. Barbosa    Your recent test results are attached. Stool test negative for blood . Needs recheck in 1 yr for follow up    If you have any questions or concerns please contact me via My Chart or call the clinic at [phone]     Thank You  Rosalinda Whitney MD.

## 2022-08-01 DIAGNOSIS — E78.5 HYPERLIPIDEMIA LDL GOAL <70: ICD-10-CM

## 2022-08-01 DIAGNOSIS — I25.10 CORONARY ARTERY DISEASE INVOLVING NATIVE CORONARY ARTERY OF NATIVE HEART WITHOUT ANGINA PECTORIS: ICD-10-CM

## 2022-08-03 RX ORDER — ROSUVASTATIN CALCIUM 40 MG/1
40 TABLET, COATED ORAL DAILY
Qty: 90 TABLET | Refills: 0 | Status: SHIPPED | OUTPATIENT
Start: 2022-08-03 | End: 2022-12-23

## 2022-08-03 NOTE — TELEPHONE ENCOUNTER
Pending Prescriptions:                       Disp   Refills    rosuvastatin (CRESTOR) 40 MG tablet       90 tab*0            Sig: Take 1 tablet (40 mg) by mouth daily    Medication is being filled for 1 time phoebe refill only due to:  Patient is due for diabetic check    Please call and help schedule.  Thank you!

## 2022-08-23 ENCOUNTER — OFFICE VISIT (OUTPATIENT)
Dept: FAMILY MEDICINE | Facility: OTHER | Age: 72
End: 2022-08-23
Payer: COMMERCIAL

## 2022-08-23 VITALS
TEMPERATURE: 96.6 F | OXYGEN SATURATION: 99 % | WEIGHT: 199 LBS | SYSTOLIC BLOOD PRESSURE: 136 MMHG | BODY MASS INDEX: 26.72 KG/M2 | HEART RATE: 68 BPM | RESPIRATION RATE: 14 BRPM | DIASTOLIC BLOOD PRESSURE: 70 MMHG

## 2022-08-23 DIAGNOSIS — E11.59 TYPE 2 DIABETES MELLITUS WITH OTHER CIRCULATORY COMPLICATION, WITHOUT LONG-TERM CURRENT USE OF INSULIN (H): ICD-10-CM

## 2022-08-23 PROCEDURE — 99213 OFFICE O/P EST LOW 20 MIN: CPT | Performed by: PHYSICIAN ASSISTANT

## 2022-08-23 RX ORDER — PEN NEEDLE, DIABETIC 32GX 5/32"
NEEDLE, DISPOSABLE MISCELLANEOUS
Status: ON HOLD | COMMUNITY
Start: 2022-07-18 | End: 2023-11-06

## 2022-08-23 ASSESSMENT — PAIN SCALES - GENERAL: PAINLEVEL: NO PAIN (0)

## 2022-08-23 NOTE — PROGRESS NOTES
Assessment & Plan     Type 2 diabetes mellitus with other circulatory complication, without long-term current use of insulin (H)  Patient has been using lantus as directed. He reports morning glucose around 210. He forgot to take it one night and the morning glucose was 260. He has otherwise been tolerating it without issue. Will have him increase dose to 20units at bedtime. Continue to monitor sugars and follow up around Nov 2022.   - insulin glargine (LANTUS PEN) 100 UNIT/ML pen; Inject 20 Units Subcutaneous At Bedtime    Return in about 3 months (around 11/23/2022) for Diabetes Recheck.    DEISY Leal [facility] BRIT Lam is a 72 year old, presenting for the following health issues:    Diabetes      HPI     Diabetes Follow-up    How often are you checking your blood sugar? One time daily  What time of day are you checking your blood sugars (select all that apply)?  Before meals  Have you had any blood sugars above 200?  Yes - 210-225 mostly  Have you had any blood sugars below 70?  No    What symptoms do you notice when your blood sugar is low?  None    What concerns do you have today about your diabetes? None and Blood sugar is often over 200     Do you have any of these symptoms? (Select all that apply)  No numbness or tingling in feet.  No redness, sores or blisters on feet.  No complaints of excessive thirst.  No reports of blurry vision.  No significant changes to weight.    Have you had a diabetic eye exam in the last 12 months? Yes- Date of last eye exam: [facility] - Back in Jan 2022 ,  Location: [facility]    Patient is feeling overwhelmed with caring for wife following her stroke. She has been struggling with extremity weakness, aphasia, urinary and fecal incontinence. Discussed care coordination. He has services in place with [facility]. He will consider and reach out if he feels that this is needed.     BP Readings from Last 2 Encounters:   08/23/22 136/70    07/18/22 130/74     Hemoglobin A1C (%)   Date Value   07/18/2022 11.7 (H)   02/03/2022 10.5 (H)   11/30/2020 8.4 (H)   01/27/2020 9.7 (H)     LDL Cholesterol Calculated (mg/dL)   Date Value   02/03/2022 76   11/30/2020 82   10/30/2019 34     Review of Systems   Constitutional, HEENT, cardiovascular, pulmonary, gi and gu systems are negative, except as otherwise noted.      Objective    /70 (Cuff Size: Adult Regular)   Pulse 68   Temp (!) 96.6  F (35.9  C) (Temporal)   Resp 14   Wt 90.3 kg (199 lb)   SpO2 99%   BMI 26.72 kg/m    Body mass index is 26.72 kg/m .  Physical Exam   GENERAL: healthy, alert and no distress  NECK: no adenopathy, no asymmetry, masses, or scars and thyroid normal to palpation  RESP: lungs clear to auscultation - no rales, rhonchi or wheezes  CV: regular rate and rhythm, normal S1 S2, no S3 or S4, no murmur, click or rub, no peripheral edema and peripheral pulses strong  MS: no gross musculoskeletal defects noted, no edema  PSYCH: mentation appears normal, affect normal/bright                .  ..

## 2022-10-09 ENCOUNTER — HEALTH MAINTENANCE LETTER (OUTPATIENT)
Age: 72
End: 2022-10-09

## 2022-10-12 DIAGNOSIS — E11.8 TYPE 2 DIABETES MELLITUS WITH COMPLICATION, WITHOUT LONG-TERM CURRENT USE OF INSULIN (H): ICD-10-CM

## 2022-10-12 DIAGNOSIS — E11.59 TYPE 2 DIABETES MELLITUS WITH OTHER CIRCULATORY COMPLICATION, WITHOUT LONG-TERM CURRENT USE OF INSULIN (H): ICD-10-CM

## 2022-10-12 DIAGNOSIS — I25.10 CORONARY ARTERY DISEASE INVOLVING NATIVE CORONARY ARTERY OF NATIVE HEART WITHOUT ANGINA PECTORIS: ICD-10-CM

## 2022-10-12 DIAGNOSIS — I25.10 CORONARY ARTERY DISEASE INVOLVING NATIVE HEART WITHOUT ANGINA PECTORIS, UNSPECIFIED VESSEL OR LESION TYPE: ICD-10-CM

## 2022-10-24 DIAGNOSIS — E11.8 TYPE 2 DIABETES MELLITUS WITH COMPLICATION, WITHOUT LONG-TERM CURRENT USE OF INSULIN (H): ICD-10-CM

## 2022-10-25 DIAGNOSIS — E11.59 TYPE 2 DIABETES MELLITUS WITH OTHER CIRCULATORY COMPLICATION, WITHOUT LONG-TERM CURRENT USE OF INSULIN (H): ICD-10-CM

## 2022-10-25 DIAGNOSIS — E11.8 TYPE 2 DIABETES MELLITUS WITH COMPLICATION, WITHOUT LONG-TERM CURRENT USE OF INSULIN (H): ICD-10-CM

## 2022-10-25 DIAGNOSIS — I25.10 CORONARY ARTERY DISEASE INVOLVING NATIVE HEART WITHOUT ANGINA PECTORIS, UNSPECIFIED VESSEL OR LESION TYPE: ICD-10-CM

## 2022-10-25 DIAGNOSIS — I25.10 CORONARY ARTERY DISEASE INVOLVING NATIVE CORONARY ARTERY OF NATIVE HEART WITHOUT ANGINA PECTORIS: ICD-10-CM

## 2022-10-25 RX ORDER — GLIPIZIDE 10 MG/1
10 TABLET ORAL
Qty: 60 TABLET | Refills: 0 | Status: SHIPPED | OUTPATIENT
Start: 2022-10-25 | End: 2022-11-23

## 2022-10-25 NOTE — TELEPHONE ENCOUNTER
Out of glipizide and needs to be back on his Jardiance.  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    Last OV 08/23/2022    Lab Results   Component Value Date    A1C 11.7 07/18/2022    A1C 10.5 02/03/2022    A1C 8.4 11/30/2020    A1C 9.7 01/27/2020    A1C 8.0 10/30/2019    A1C 7.9 09/11/2019    A1C 7.7 03/28/2019         Would like to know either way if these are filled.    rx pending.    Hakan Carvalho, GLORIAN, RN, PHN  [facility] ~ Registered Nurse  Clinic Triage ~ [facility]  October 25, 2022

## 2022-10-27 RX ORDER — GLIPIZIDE 10 MG/1
TABLET ORAL
Qty: 180 TABLET | Refills: 0 | OUTPATIENT
Start: 2022-10-27

## 2022-11-23 ENCOUNTER — TELEPHONE (OUTPATIENT)
Dept: FAMILY MEDICINE | Facility: OTHER | Age: 72
End: 2022-11-23

## 2022-11-23 DIAGNOSIS — I10 HTN, GOAL BELOW 140/90: ICD-10-CM

## 2022-11-23 DIAGNOSIS — E11.8 TYPE 2 DIABETES MELLITUS WITH COMPLICATION, WITHOUT LONG-TERM CURRENT USE OF INSULIN (H): ICD-10-CM

## 2022-11-23 RX ORDER — METOPROLOL TARTRATE 50 MG
TABLET ORAL
Qty: 180 TABLET | Refills: 2 | Status: SHIPPED | OUTPATIENT
Start: 2022-11-23 | End: 2022-12-23

## 2022-11-23 RX ORDER — GLIPIZIDE 10 MG/1
TABLET ORAL
Qty: 60 TABLET | Refills: 0 | OUTPATIENT
Start: 2022-11-23

## 2022-11-23 RX ORDER — GLIPIZIDE 10 MG/1
10 TABLET ORAL
Qty: 60 TABLET | Refills: 0 | Status: SHIPPED | OUTPATIENT
Start: 2022-11-23 | End: 2022-12-23

## 2022-11-23 NOTE — TELEPHONE ENCOUNTER
Patient is overdue for office visit. Nikki period has been provided and patient did not schedule. Please help patient schedule appointment and I can fill medication to get them to that date.     Rigo Rivas PA-C on 11/23/2022 at 2:37 PM       [FreeTextEntry1] : Persistent headaches/migraine: suspect migraine given photophobia, no aura, will trial eletriptan prn, pt reports poor sleep, discussed proper sleep hygiene, recommend avoid triggers including stress, not eating, odors, smoke, sleeping late. Recommend relaxation training, recommend improved sleep hygiene, eat healthy meals around the same time daily, regular exercise\par Fatigue: recommend exercise, improved sleep hygiene, increased hydration, f/u labwork\par RTC 2 wks

## 2022-11-23 NOTE — TELEPHONE ENCOUNTER
Pt is requesting a refill on his glipizide.  Pt knows that he needs to have a repeat A1C done.  He is wondering if he needs to see  or if he can just get orders for the lab?    Would like to get those appointments set up .

## 2022-11-23 NOTE — TELEPHONE ENCOUNTER
Signed Prescriptions:                        Disp   Refills    glipiZIDE (GLUCOTROL) 10 MG tablet         60 tab*0        Sig: Take 1 tablet (10 mg) by mouth 2 times daily (before           meals) - DUE FOR DIABETIC CHECKUP  Authorizing Provider: KATHLEEN SOUSA  Ordering User: LEOPOLDO GILL    Refused Prescriptions:                       Disp   Refills    glipiZIDE (GLUCOTROL) 10 MG tablet [Pharma*60 tab*0        Sig: TAKE 1 TABLET BY MOUTH TWICE DAILY BEFORE  MEALS  DUE           FOR  DIABETIC  CHECK  UP  Refused By: KATHLEEN SOUSA  Reason for Refusal: Patient needs appointment    Medication is being filled for 1 time phoebe refill only due to:  Patient is due for appointment.     Next 5 appointments (look out 90 days)    Dec 23, 2022  3:40 PM  (Arrive by 3:20 PM)  Provider Visit with Kathleen Sousa PA-C  [facility] ([facility] ) [address] [place] [id number] [phone]            Please call and help schedule.  Thank you!

## 2022-11-23 NOTE — TELEPHONE ENCOUNTER
Has been seeing , technically up to him. Though 10/12 encounter states to schedule follow-up. Please just schedule him  Mayelin Hernandez MD

## 2022-11-23 NOTE — TELEPHONE ENCOUNTER
Pending Prescriptions:                       Disp   Refills    glipiZIDE (GLUCOTROL) 10 MG tablet [Pharma*60 tab*0        Sig: TAKE 1 TABLET BY MOUTH TWICE DAILY BEFORE  MEALS  DUE           FOR  DIABETIC  CHECK  UP    Routing refill request to provider for review/approval because:  Labs out of range:    Lab Results   Component Value Date    A1C 11.7 07/18/2022    A1C 10.5 02/03/2022    A1C 8.4 11/30/2020    A1C 9.7 01/27/2020    A1C 8.0 10/30/2019    A1C 7.9 09/11/2019    A1C 7.7 03/28/2019

## 2022-12-01 DIAGNOSIS — E11.59 TYPE 2 DIABETES MELLITUS WITH OTHER CIRCULATORY COMPLICATION, WITHOUT LONG-TERM CURRENT USE OF INSULIN (H): ICD-10-CM

## 2022-12-01 DIAGNOSIS — E11.8 TYPE 2 DIABETES MELLITUS WITH COMPLICATION, WITHOUT LONG-TERM CURRENT USE OF INSULIN (H): ICD-10-CM

## 2022-12-01 DIAGNOSIS — I25.10 CORONARY ARTERY DISEASE INVOLVING NATIVE HEART WITHOUT ANGINA PECTORIS, UNSPECIFIED VESSEL OR LESION TYPE: ICD-10-CM

## 2022-12-01 DIAGNOSIS — I25.10 CORONARY ARTERY DISEASE INVOLVING NATIVE CORONARY ARTERY OF NATIVE HEART WITHOUT ANGINA PECTORIS: ICD-10-CM

## 2022-12-04 NOTE — TELEPHONE ENCOUNTER
Routing refill request to provider for review/approval because:  Labs out of range:  A1c     Ash Mtz RN

## 2022-12-05 RX ORDER — EMPAGLIFLOZIN 25 MG/1
TABLET, FILM COATED ORAL
Qty: 30 TABLET | Refills: 0 | Status: SHIPPED | OUTPATIENT
Start: 2022-12-05 | End: 2022-12-23

## 2022-12-23 ENCOUNTER — OFFICE VISIT (OUTPATIENT)
Dept: FAMILY MEDICINE | Facility: OTHER | Age: 72
End: 2022-12-23
Payer: COMMERCIAL

## 2022-12-23 VITALS
DIASTOLIC BLOOD PRESSURE: 70 MMHG | WEIGHT: 195 LBS | OXYGEN SATURATION: 97 % | SYSTOLIC BLOOD PRESSURE: 134 MMHG | HEART RATE: 77 BPM | BODY MASS INDEX: 26.18 KG/M2 | TEMPERATURE: 97.5 F

## 2022-12-23 DIAGNOSIS — E11.29 MICROALBUMINURIA DUE TO TYPE 2 DIABETES MELLITUS (H): ICD-10-CM

## 2022-12-23 DIAGNOSIS — I10 HTN, GOAL BELOW 140/90: ICD-10-CM

## 2022-12-23 DIAGNOSIS — R80.9 MICROALBUMINURIA DUE TO TYPE 2 DIABETES MELLITUS (H): ICD-10-CM

## 2022-12-23 DIAGNOSIS — E11.59 TYPE 2 DIABETES MELLITUS WITH OTHER CIRCULATORY COMPLICATION, WITHOUT LONG-TERM CURRENT USE OF INSULIN (H): Primary | ICD-10-CM

## 2022-12-23 DIAGNOSIS — E78.5 HYPERLIPIDEMIA LDL GOAL <70: ICD-10-CM

## 2022-12-23 DIAGNOSIS — I25.10 CORONARY ARTERY DISEASE INVOLVING NATIVE HEART WITHOUT ANGINA PECTORIS, UNSPECIFIED VESSEL OR LESION TYPE: ICD-10-CM

## 2022-12-23 DIAGNOSIS — I25.10 CORONARY ARTERY DISEASE INVOLVING NATIVE CORONARY ARTERY OF NATIVE HEART WITHOUT ANGINA PECTORIS: ICD-10-CM

## 2022-12-23 LAB
ALBUMIN SERPL-MCNC: 3.9 G/DL (ref 3.4–5)
ALP SERPL-CCNC: 70 U/L (ref 40–150)
ALT SERPL W P-5'-P-CCNC: 30 U/L (ref 0–70)
ANION GAP SERPL CALCULATED.3IONS-SCNC: 8 MMOL/L (ref 3–14)
AST SERPL W P-5'-P-CCNC: 13 U/L (ref 0–45)
BILIRUB SERPL-MCNC: 1.2 MG/DL (ref 0.2–1.3)
BUN SERPL-MCNC: 20 MG/DL (ref 7–30)
CALCIUM SERPL-MCNC: 9 MG/DL (ref 8.5–10.1)
CHLORIDE BLD-SCNC: 108 MMOL/L (ref 94–109)
CO2 SERPL-SCNC: 26 MMOL/L (ref 20–32)
CREAT SERPL-MCNC: 0.95 MG/DL (ref 0.66–1.25)
GFR SERPL CREATININE-BSD FRML MDRD: 84 ML/MIN/1.73M2
GLUCOSE BLD-MCNC: 199 MG/DL (ref 70–99)
HBA1C MFR BLD: 9.3 % (ref 0–5.6)
POTASSIUM BLD-SCNC: 4.2 MMOL/L (ref 3.4–5.3)
PROT SERPL-MCNC: 7.5 G/DL (ref 6.8–8.8)
SODIUM SERPL-SCNC: 142 MMOL/L (ref 133–144)

## 2022-12-23 PROCEDURE — 36415 COLL VENOUS BLD VENIPUNCTURE: CPT | Performed by: PHYSICIAN ASSISTANT

## 2022-12-23 PROCEDURE — 80053 COMPREHEN METABOLIC PANEL: CPT | Performed by: PHYSICIAN ASSISTANT

## 2022-12-23 PROCEDURE — 99214 OFFICE O/P EST MOD 30 MIN: CPT | Performed by: PHYSICIAN ASSISTANT

## 2022-12-23 PROCEDURE — 83036 HEMOGLOBIN GLYCOSYLATED A1C: CPT | Performed by: PHYSICIAN ASSISTANT

## 2022-12-23 RX ORDER — ROSUVASTATIN CALCIUM 40 MG/1
40 TABLET, COATED ORAL DAILY
Qty: 90 TABLET | Refills: 3 | Status: SHIPPED | OUTPATIENT
Start: 2022-12-23 | End: 2024-05-14

## 2022-12-23 RX ORDER — GLIPIZIDE 10 MG/1
10 TABLET ORAL
Qty: 180 TABLET | Refills: 1 | Status: SHIPPED | OUTPATIENT
Start: 2022-12-23 | End: 2023-06-28

## 2022-12-23 RX ORDER — METOPROLOL TARTRATE 50 MG
50 TABLET ORAL 2 TIMES DAILY
Qty: 180 TABLET | Refills: 3 | Status: SHIPPED | OUTPATIENT
Start: 2022-12-23 | End: 2023-08-22

## 2022-12-23 NOTE — PROGRESS NOTES
Assessment & Plan     Type 2 diabetes mellitus with other circulatory complication, without long-term current use of insulin (H)  Patient had been prescribed insulin at time of previous visit 3 months ago. 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 He was sent out jardiance by PCP and has been tolerating this without issue. A1c has returned improved at 9.3%. While this is still above target range is is down from 11.7%. I will have him return in 3 months for next checkup. Continue medications without change.   - Comprehensive metabolic panel (BMP + Alb, Alk Phos, ALT, AST, Total. Bili, TP); Future  - Hemoglobin A1c; Future  - empagliflozin (JARDIANCE) 25 MG TABS tablet; Take 1 tablet (25 mg) by mouth daily  - Comprehensive metabolic panel (BMP + Alb, Alk Phos, ALT, AST, Total. Bili, TP)  - Hemoglobin A1c    Microalbuminuria due to type 2 diabetes mellitus (H)  See above.     Coronary artery disease involving native coronary artery of native heart without angina pectoris  Hyperlipidemia LDL goal <70  The 10-year ASCVD risk score (Gena FELIX, et al., 2019) is: 46.9%    Values used to calculate the score:      Age: 72 years      Sex: Male      Is Non- : No      Diabetic: Yes      Tobacco smoker: No      Systolic Blood Pressure: 134 mmHg      Is BP treated: Yes      HDL Cholesterol: 42 mg/dL      Total Cholesterol: 156 mg/dL  Patient will continue statin medication without change.  - empagliflozin (JARDIANCE) 25 MG TABS tablet; Take 1 tablet (25 mg) by mouth daily  - rosuvastatin (CRESTOR) 40 MG tablet; Take 1 tablet (40 mg) by mouth daily    Coronary artery disease involving native heart without angina pectoris, unspecified vessel or lesion type  See above.   - empagliflozin (JARDIANCE) 25 MG TABS tablet; Take 1 tablet (25 mg) by mouth daily    HTN, goal below 140/90  BP is 134/70, this is acceptable. He will continue antihypertensive medication  without change.   - metoprolol tartrate (LOPRESSOR) 50 MG tablet; Take 1 tablet (50 mg) by mouth 2 times daily        Return in about 3 months (around 3/23/2023) for Diabetes Recheck.    DEISY Leal [facility] BRIT Lam is a 72 year old, presenting for the following health issues:  Diabetes      HPI     Diabetes Follow-up    How often are you checking your blood sugar? A few times a week  What time of day are you checking your blood sugars (select all that apply)?  Before meals  Have you had any blood sugars above 200?  Yes BEFORE JAURDIANCE  Have you had any blood sugars below 70?  No    What symptoms do you notice when your blood sugar is low?  None    What concerns do you have today about your diabetes? None and Other: A1C     Do you have any of these symptoms? (Select all that apply)  No numbness or tingling in feet.  No redness, sores or blisters on feet.  No complaints of excessive thirst.  No reports of blurry vision.  No significant changes to weight.    Have you had a diabetic eye exam in the last 12 months? Yes- Date of last eye exam: [facility]        BP Readings from Last 2 Encounters:   12/23/22 134/70   08/23/22 136/70     Hemoglobin A1C (%)   Date Value   07/18/2022 11.7 (H)   02/03/2022 10.5 (H)   11/30/2020 8.4 (H)   01/27/2020 9.7 (H)     LDL Cholesterol Calculated (mg/dL)   Date Value   02/03/2022 76   11/30/2020 82   10/30/2019 34       How many servings of fruits and vegetables do you eat daily?  0-1    On average, how many sweetened beverages do you drink each day (Examples: soda, juice, sweet tea, etc.  Do NOT count diet or artificially sweetened beverages)?   0    How many days per week do you exercise enough to make your heart beat faster? 3 or less    How many minutes a day do you exercise enough to make your heart beat faster? 10 - 19    How many days per week do you miss taking your medication? 0    Review of Systems   Constitutional,  HEENT, cardiovascular, pulmonary, gi and gu systems are negative, except as otherwise noted.      Objective    /70 (BP Location: Right arm, Patient Position: Sitting, Cuff Size: Adult Regular)   Pulse 77   Temp 97.5  F (36.4  C) (Temporal)   Wt 88.5 kg (195 lb)   SpO2 97%   BMI 26.18 kg/m    Body mass index is 26.18 kg/m .  Physical Exam   GENERAL: healthy, alert and no distress  EYES: Eyes grossly normal to inspection, PERRL and conjunctivae and sclerae normal  NECK: no adenopathy, no asymmetry, masses, or scars and thyroid normal to palpation  RESP: lungs clear to auscultation - no rales, rhonchi or wheezes  CV: regular rate and rhythm, normal S1 S2, no S3 or S4, no murmur, click or rub, no peripheral edema and peripheral pulses strong  MS: no gross musculoskeletal defects noted, no edema  PSYCH: mentation appears normal, affect normal/bright    Results for orders placed or performed in visit on 12/23/22   Comprehensive metabolic panel (BMP + Alb, Alk Phos, ALT, AST, Total. Bili, TP)     Status: Abnormal   Result Value Ref Range    Sodium 142 133 - 144 mmol/L    Potassium 4.2 3.4 - 5.3 mmol/L    Chloride 108 94 - 109 mmol/L    Carbon Dioxide (CO2) 26 20 - 32 mmol/L    Anion Gap 8 3 - 14 mmol/L    Urea Nitrogen 20 7 - 30 mg/dL    Creatinine 0.95 0.66 - 1.25 mg/dL    Calcium 9.0 8.5 - 10.1 mg/dL    Glucose 199 (H) 70 - 99 mg/dL    Alkaline Phosphatase 70 40 - 150 U/L    AST 13 0 - 45 U/L    ALT 30 0 - 70 U/L    Protein Total 7.5 6.8 - 8.8 g/dL    Albumin 3.9 3.4 - 5.0 g/dL    Bilirubin Total 1.2 0.2 - 1.3 mg/dL    GFR Estimate 84 >60 mL/min/1.73m2   Hemoglobin A1c     Status: Abnormal   Result Value Ref Range    Hemoglobin A1C 9.3 (H) 0.0 - 5.6 %

## 2023-05-10 DIAGNOSIS — E11.59 TYPE 2 DIABETES MELLITUS WITH OTHER CIRCULATORY COMPLICATION, WITHOUT LONG-TERM CURRENT USE OF INSULIN (H): ICD-10-CM

## 2023-05-10 NOTE — TELEPHONE ENCOUNTER
"Pending Prescriptions:                       Disp   Refills    metFORMIN (GLUCOPHAGE) 500 MG tablet [Phar*360 ta*0        Sig: TAKE 2 TABLETS BY MOUTH TWICE DAILY WITH MEALS    Routing refill request to provider for review/approval because:  Requested Prescriptions   Pending Prescriptions Disp Refills    metFORMIN (GLUCOPHAGE) 500 MG tablet [Pharmacy Med Name: metFORMIN HCl 500 MG Oral Tablet] 360 tablet 0     Sig: TAKE 2 TABLETS BY MOUTH TWICE DAILY WITH MEALS       Biguanide Agents Failed - 5/10/2023 10:16 AM        Failed - Patient has documented A1c within the specified period of time.     If HgbA1C is 8 or greater, it needs to be on file within the past 3 months.  If less than 8, must be on file within the past 6 months.     Recent Labs   Lab Test 12/23/22  1607   A1C 9.3*             Passed - Patient is age 10 or older        Passed - Patient's CR is NOT>1.4 OR Patient's EGFR is NOT<45 within past 12 mos.     Recent Labs   Lab Test 12/23/22  1607 02/03/22  1104 11/30/20  1026   GFRESTIMATED 84   < > 58*   GFRESTBLACK  --   --  67    < > = values in this interval not displayed.       Recent Labs   Lab Test 12/23/22  1607   CR 0.95             Passed - Patient does NOT have a diagnosis of CHF.        Passed - Medication is active on med list        Passed - Recent (6 mo) or future (30 days) visit within the authorizing provider's specialty     Patient had office visit in the last 6 months or has a visit in the next 30 days with authorizing provider or within the authorizing provider's specialty.  See \"Patient Info\" tab in inbasket, or \"Choose Columns\" in Meds & Orders section of the refill encounter.               metFORMIN (GLUCOPHAGE) 500 MG tablet 360 tablet 1 12/23/2022  No   Sig - Route: Take 2 tablets (1,000 mg) by mouth 2 times daily (with meals) - Oral   Sent to pharmacy as: metFORMIN HCl 500 MG Oral Tablet (GLUCOPHAGE)   Class: E-Prescribe   Order: [id number]   E-Prescribing Status: Receipt confirmed by " pharmacy (12/23/2022  3:53 PM CST)     Simona Bill RN on 5/10/2023 at 1:15 PM

## 2023-06-19 ENCOUNTER — PATIENT OUTREACH (OUTPATIENT)
Dept: CARE COORDINATION | Facility: CLINIC | Age: 73
End: 2023-06-19
Payer: COMMERCIAL

## 2023-06-28 DIAGNOSIS — I25.10 CORONARY ARTERY DISEASE INVOLVING NATIVE CORONARY ARTERY OF NATIVE HEART WITHOUT ANGINA PECTORIS: ICD-10-CM

## 2023-06-28 DIAGNOSIS — I25.10 CORONARY ARTERY DISEASE INVOLVING NATIVE HEART WITHOUT ANGINA PECTORIS, UNSPECIFIED VESSEL OR LESION TYPE: ICD-10-CM

## 2023-06-28 DIAGNOSIS — E11.59 TYPE 2 DIABETES MELLITUS WITH OTHER CIRCULATORY COMPLICATION, WITHOUT LONG-TERM CURRENT USE OF INSULIN (H): ICD-10-CM

## 2023-06-28 NOTE — TELEPHONE ENCOUNTER
Pending Prescriptions:                       Disp   Refills    JARDIANCE 25 MG TABS tablet [Pharmacy Med *30 tab*0        Sig: Take 1 tablet by mouth once daily    Routing refill request to provider for review/approval because:  Labs out of range:    Hemoglobin A1C   Date Value Ref Range Status   12/23/2022 9.3 (H) 0.0 - 5.6 % Final     Comment:     Normal <5.7%   Prediabetes 5.7-6.4%    Diabetes 6.5% or higher     Note: Adopted from ADA consensus guidelines.   11/30/2020 8.4 (H) 0 - 5.6 % Final     Comment:     Normal <5.7% Prediabetes 5.7-6.4%  Diabetes 6.5% or higher - adopted from ADA   consensus guidelines.       Appointments in Next Year      Jul 25, 2023 11:40 AM  (Arrive by 11:20 AM)  Provider Visit with Rigo Rivas PA-C  [facility] ([facility] ) [phone]

## 2023-06-29 RX ORDER — EMPAGLIFLOZIN 25 MG/1
TABLET, FILM COATED ORAL
Qty: 30 TABLET | Refills: 0 | Status: SHIPPED | OUTPATIENT
Start: 2023-06-29 | End: 2023-08-22

## 2023-07-03 ENCOUNTER — PATIENT OUTREACH (OUTPATIENT)
Dept: CARE COORDINATION | Facility: CLINIC | Age: 73
End: 2023-07-03
Payer: COMMERCIAL

## 2023-08-01 DIAGNOSIS — E11.59 TYPE 2 DIABETES MELLITUS WITH OTHER CIRCULATORY COMPLICATION, WITHOUT LONG-TERM CURRENT USE OF INSULIN (H): ICD-10-CM

## 2023-08-02 RX ORDER — GLIPIZIDE 10 MG/1
TABLET ORAL
Qty: 60 TABLET | Refills: 0 | Status: SHIPPED | OUTPATIENT
Start: 2023-08-02 | End: 2023-08-22

## 2023-08-19 ENCOUNTER — HEALTH MAINTENANCE LETTER (OUTPATIENT)
Age: 73
End: 2023-08-19

## 2023-08-22 ENCOUNTER — MYC MEDICAL ADVICE (OUTPATIENT)
Dept: FAMILY MEDICINE | Facility: OTHER | Age: 73
End: 2023-08-22

## 2023-08-22 ENCOUNTER — OFFICE VISIT (OUTPATIENT)
Dept: FAMILY MEDICINE | Facility: OTHER | Age: 73
End: 2023-08-22
Payer: COMMERCIAL

## 2023-08-22 VITALS
TEMPERATURE: 97.6 F | RESPIRATION RATE: 18 BRPM | WEIGHT: 197 LBS | SYSTOLIC BLOOD PRESSURE: 126 MMHG | HEIGHT: 72 IN | OXYGEN SATURATION: 98 % | HEART RATE: 60 BPM | DIASTOLIC BLOOD PRESSURE: 64 MMHG | BODY MASS INDEX: 26.68 KG/M2

## 2023-08-22 DIAGNOSIS — I10 HTN, GOAL BELOW 140/90: ICD-10-CM

## 2023-08-22 DIAGNOSIS — I25.10 CORONARY ARTERY DISEASE INVOLVING NATIVE CORONARY ARTERY OF NATIVE HEART WITHOUT ANGINA PECTORIS: ICD-10-CM

## 2023-08-22 DIAGNOSIS — I73.9 PERIPHERAL VASCULAR DISEASE, UNSPECIFIED (H): ICD-10-CM

## 2023-08-22 DIAGNOSIS — R80.9 MICROALBUMINURIA DUE TO TYPE 2 DIABETES MELLITUS (H): ICD-10-CM

## 2023-08-22 DIAGNOSIS — E11.29 MICROALBUMINURIA DUE TO TYPE 2 DIABETES MELLITUS (H): ICD-10-CM

## 2023-08-22 DIAGNOSIS — I48.91 ATRIAL FIBRILLATION, UNSPECIFIED TYPE (H): ICD-10-CM

## 2023-08-22 DIAGNOSIS — L02.11 ABSCESS OF NECK: ICD-10-CM

## 2023-08-22 DIAGNOSIS — E11.59 TYPE 2 DIABETES MELLITUS WITH OTHER CIRCULATORY COMPLICATION, WITHOUT LONG-TERM CURRENT USE OF INSULIN (H): Primary | ICD-10-CM

## 2023-08-22 DIAGNOSIS — Z12.11 SPECIAL SCREENING FOR MALIGNANT NEOPLASMS, COLON: ICD-10-CM

## 2023-08-22 DIAGNOSIS — N18.31 CHRONIC KIDNEY DISEASE, STAGE 3A (H): ICD-10-CM

## 2023-08-22 PROBLEM — E66.01 MORBID OBESITY (H): Status: RESOLVED | Noted: 2018-11-14 | Resolved: 2023-08-22

## 2023-08-22 PROBLEM — I25.118 CORONARY ARTERY DISEASE OF NATIVE ARTERY OF NATIVE HEART WITH STABLE ANGINA PECTORIS (H): Status: RESOLVED | Noted: 2020-01-27 | Resolved: 2023-08-22

## 2023-08-22 LAB
CHOLEST SERPL-MCNC: 173 MG/DL
CREAT UR-MCNC: 42.5 MG/DL
HBA1C MFR BLD: 9.4 % (ref 0–5.6)
HDLC SERPL-MCNC: 57 MG/DL
LDLC SERPL CALC-MCNC: 84 MG/DL
MICROALBUMIN UR-MCNC: 15.2 MG/L
MICROALBUMIN/CREAT UR: 35.76 MG/G CR (ref 0–17)
NONHDLC SERPL-MCNC: 116 MG/DL
TRIGL SERPL-MCNC: 161 MG/DL

## 2023-08-22 PROCEDURE — 99207 PR FOOT EXAM NO CHARGE: CPT | Performed by: PHYSICIAN ASSISTANT

## 2023-08-22 PROCEDURE — 83036 HEMOGLOBIN GLYCOSYLATED A1C: CPT | Performed by: PHYSICIAN ASSISTANT

## 2023-08-22 PROCEDURE — 99214 OFFICE O/P EST MOD 30 MIN: CPT | Performed by: PHYSICIAN ASSISTANT

## 2023-08-22 PROCEDURE — 82043 UR ALBUMIN QUANTITATIVE: CPT | Performed by: PHYSICIAN ASSISTANT

## 2023-08-22 PROCEDURE — 82570 ASSAY OF URINE CREATININE: CPT | Performed by: PHYSICIAN ASSISTANT

## 2023-08-22 PROCEDURE — 80061 LIPID PANEL: CPT | Performed by: PHYSICIAN ASSISTANT

## 2023-08-22 PROCEDURE — 36415 COLL VENOUS BLD VENIPUNCTURE: CPT | Performed by: PHYSICIAN ASSISTANT

## 2023-08-22 RX ORDER — CEPHALEXIN 500 MG/1
500 CAPSULE ORAL 2 TIMES DAILY
Qty: 14 CAPSULE | Refills: 0 | Status: ON HOLD | OUTPATIENT
Start: 2023-08-22 | End: 2023-11-06

## 2023-08-22 RX ORDER — PEN NEEDLE, DIABETIC 32GX 5/32"
NEEDLE, DISPOSABLE MISCELLANEOUS
Qty: 100 EACH | Refills: 3 | Status: ON HOLD | OUTPATIENT
Start: 2023-08-22 | End: 2023-11-06

## 2023-08-22 RX ORDER — GLUCOSAMINE HCL/CHONDROITIN SU 500-400 MG
CAPSULE ORAL
Qty: 100 EACH | Refills: 3 | Status: ON HOLD | OUTPATIENT
Start: 2023-08-22 | End: 2023-11-06

## 2023-08-22 RX ORDER — LIRAGLUTIDE 6 MG/ML
0.6 INJECTION SUBCUTANEOUS DAILY
Qty: 3 ML | Refills: 2 | Status: ON HOLD | OUTPATIENT
Start: 2023-08-22 | End: 2023-11-06

## 2023-08-22 RX ORDER — METOPROLOL TARTRATE 50 MG
50 TABLET ORAL 2 TIMES DAILY
Qty: 180 TABLET | Refills: 3 | Status: SHIPPED | OUTPATIENT
Start: 2023-08-22 | End: 2024-08-26

## 2023-08-22 RX ORDER — GLIPIZIDE 10 MG/1
10 TABLET ORAL
Qty: 180 TABLET | Refills: 0 | Status: SHIPPED | OUTPATIENT
Start: 2023-08-22 | End: 2023-12-26

## 2023-08-22 NOTE — PROGRESS NOTES
Assessment & Plan     Type 2 diabetes mellitus with other circulatory complication, without long-term current use of insulin (H)  Patient's last A1c was above target range at 9.3%. Repeat check today shows that the level is largely unchanged. As the patient is already on 3 oral medications, I have recommended that he begin long acting insulin. I will also put in a referral for MTM to reach out to the patient to help provide additional instruction and supervision. Follow up in 3 months.   - Lipid panel reflex to direct LDL Non-fasting; Future  - Albumin Random Urine Quantitative with Creat Ratio; Future  - HEMOGLOBIN A1C; Future  - metFORMIN (GLUCOPHAGE) 500 MG tablet; Take 2 tablets (1,000 mg) by mouth 2 times daily (with meals)  - glipiZIDE (GLUCOTROL) 10 MG tablet; Take 1 tablet (10 mg) by mouth 2 times daily (before meals)  - FOOT EXAM  - empagliflozin (JARDIANCE) 25 MG TABS tablet; Take 1 tablet (25 mg) by mouth daily  - Lipid panel reflex to direct LDL Non-fasting  - Albumin Random Urine Quantitative with Creat Ratio  - HEMOGLOBIN A1C    Microalbuminuria due to type 2 diabetes mellitus (H)  Unable to provide urine sample today. He will return with this at a later date.   - HEMOGLOBIN A1C; Future  - HEMOGLOBIN A1C    Chronic kidney disease, stage 3a (H)  Metabolic panel from 12/2022 showed that the renal function was improved compared to previous checks 1 year ago. Will continue to monitor this at future checkups.   - Albumin Random Urine Quantitative with Creat Ratio; Future  - Albumin Random Urine Quantitative with Creat Ratio    HTN, goal below 140/90  BP is 126/64. Patient stopped his lisinopril. He has, however, continued the metoprolol. I am in support of this. Pending microalbumin can consider restarting the ACE inhibitor.   - Lipid panel reflex to direct LDL Non-fasting; Future  - metoprolol tartrate (LOPRESSOR) 50 MG tablet; Take 1 tablet (50 mg) by mouth 2 times daily  - Lipid panel reflex to direct  LDL Non-fasting    Coronary artery disease involving native coronary artery of native heart without angina pectoris  The 10-year ASCVD risk score (Gena FELIX, et al., 2019) is: 45.7%    Values used to calculate the score:      Age: 73 years      Sex: Male      Is Non- : No      Diabetic: Yes      Tobacco smoker: No      Systolic Blood Pressure: 126 mmHg      Is BP treated: Yes      HDL Cholesterol: 42 mg/dL      Total Cholesterol: 156 mg/dL  Patient was advised to continue statin and jardiance. He is due for follow up with cardiologist this Fall 2023. No new symptoms at this time. He is under increased stress caring for spouse who has persistent complications from past stroke.   - Lipid panel reflex to direct LDL Non-fasting; Future  - empagliflozin (JARDIANCE) 25 MG TABS tablet; Take 1 tablet (25 mg) by mouth daily  - Lipid panel reflex to direct LDL Non-fasting    Atrial fibrillation, unspecified type (H)  Patient stopped his daily aspirin. We discussed stroke risk involved with this. He has upcoming visit with cardiologist. Discussion on resuming this or anticoagulation can be deferred to this provider.     Peripheral vascular disease, unspecified (H)  Patient would benefit from resumption of aspiring. He was encouraged to remain as active as he is able. The responsibilities he has for caring for his spouse limits the amount of free time he has available for activities such as this.     Abscess of neck  States that spouse has been able to express pus/sebum with pressure. I do not see a central opening or head. Minimal fluctuance at this time. I will have patient begin course of oral antibiotic and apply warm compress to the area. If not improving as expected may return for incision and drainage. Reference material attached to the AVS.   - cephALEXin (KEFLEX) 500 MG capsule; Take 1 capsule (500 mg) by mouth 2 times daily    Special screening for malignant neoplasms, colon  Complete at his  leisure.   - Fecal colorectal cancer screen FIT - Future (S+30); Future  - Fecal colorectal cancer screen FIT - Future (S+30)       BMI:   Estimated body mass index is 26.72 kg/m  as calculated from the following:    Height as of this encounter: 1.829 m (6').    Weight as of this encounter: 89.4 kg (197 lb).   Weight management plan: Discussed healthy diet and exercise guidelines        DEISY Leal [facility] SELENE Lam is a 73 year old, presenting for the following health issues:  No chief complaint on file.      8/22/2023    10:26 AM   Additional Questions   Roomed by Angela SHEARER   Accompanied by self       History of Present Illness       Diabetes:   He presents for follow up of diabetes.  He is checking home blood glucose a few times a month.   He checks blood glucose before meals.  Blood glucose is never over 200 and never under 70. He is aware of hypoglycemia symptoms including none.    He has no concerns regarding his diabetes at this time.  He is having blurry vision.  The patient has not had a diabetic eye exam in the last 12 months.          He eats 0-1 servings of fruits and vegetables daily.He consumes 0 sweetened beverage(s) daily.He exercises with enough effort to increase his heart rate 9 or less minutes per day.  He exercises with enough effort to increase his heart rate 3 or less days per week.   He is taking medications regularly.     Been a little over a year per patient for last eye exam, he is aware he is due for that and he will schedule one soon and make sure we get a copy.      Concern - Cyst or pimple on back of neck  Onset: 9 months or more ago  Description: gets big and wife tried to pop it like a pimple and did get some stuff out of it but it just keeps getting bigger and oozing into his shirt  Intensity: mild , as of now it was hurting for a while when he would lay down in bed but no pain now  Progression of Symptoms:  worsening and constant (has  "been getting smaller when squeezing \"junk\" out of it)  Accompanying Signs & Symptoms: just gets big like a pimple needs to be popped but no other symptoms   Previous history of similar problem: no  Precipitating factors:        Worsened by: N/A  Alleviating factors:        Improved by: squeezing the \"junk/white stuff\" out (wife keeps saying \"I can't get the root out\")  Therapies tried and outcome: None  '    Review of Systems   Constitutional, HEENT, cardiovascular, pulmonary, gi and gu systems are negative, except as otherwise noted.      Objective    /64   Pulse 60   Temp 97.6  F (36.4  C) (Temporal)   Resp 18   Ht 1.829 m (6')   Wt 89.4 kg (197 lb)   SpO2 98%   BMI 26.72 kg/m    Body mass index is 26.72 kg/m .  Physical Exam   GENERAL: healthy, alert and no distress  EYES: Eyes grossly normal to inspection, PERRL and conjunctivae and sclerae normal  HENT: ear canals and TM's normal, nose and mouth without ulcers or lesions  NECK: no adenopathy, no asymmetry, masses, or scars and thyroid normal to palpation  RESP: lungs clear to auscultation - no rales, rhonchi or wheezes  CV: regular rate and rhythm, normal S1 S2, no S3 or S4, no murmur, click or rub, no peripheral edema and peripheral pulses strong  MS: no gross musculoskeletal defects noted, no edema  SKIN: small abscess along the left posterior neck, minimally fluctuant or tender, warm to touch. Unable to express any discharge  PSYCH: mentation appears normal, affect normal/bright  Diabetic foot exam: normal DP and PT pulses, no trophic changes or ulcerative lesions, normal sensory exam, and nail exam onychomycosis of the toenails    Results for orders placed or performed in visit on 08/22/23   HEMOGLOBIN A1C     Status: Abnormal   Result Value Ref Range    Hemoglobin A1C 9.4 (H) 0.0 - 5.6 %                     "

## 2023-08-22 NOTE — TELEPHONE ENCOUNTER
The patient was seen today by  regarding T2DM.   Recommend to start taking insulin.     Patient responded that he is unable to take insulin due to being a .     Alpesh Espinosa, MSN, RN, PHN  [facility]  August 22, 2023

## 2023-08-22 NOTE — RESULT ENCOUNTER NOTE
Please fax lab results to   [facility]   Attn. Dr. Carlos  [id number] [place], [place] [id number] [phone]          Thanks,  Rigo Rivas PA-C on 8/22/2023 at 1:51 PM

## 2023-08-23 NOTE — TELEPHONE ENCOUNTER
Left message for Genaro to call back to relay message below from Rigo Rivas.      Please call patient to inform him that I will cancel the insulin and will start him on victoza. It is also a once daily injectable. Lets plan for follow up in 3 months. He can continue to work with the MTM team during this time.     Rigo Rivas PA-C on 8/22/2023 at 7:16 PM       Vijaya Mckeon RN  [facility] ~ Registered Nurse  Clinic Triage ~ [facility]  August 23, 2023

## 2023-08-23 NOTE — TELEPHONE ENCOUNTER
Please call patient to inform him that I will cancel the insulin and will start him on victoza. It is also a once daily injectable. Lets plan for follow up in 3 months. He can continue to work with the MTM team during this time.    Rigo Rivas PA-C on 8/22/2023 at 7:16 PM

## 2023-08-24 ENCOUNTER — TELEPHONE (OUTPATIENT)
Dept: FAMILY MEDICINE | Facility: OTHER | Age: 73
End: 2023-08-24
Payer: COMMERCIAL

## 2023-08-24 NOTE — TELEPHONE ENCOUNTER
MTM referral from: [facility] visit (referral by provider)    MTM referral outreach attempt #2 on August 24, 2023 at 9:30 AM      Outcome: Patient is not interested at this time, will route to MTM Pharmacist/Provider as an FYI. Thank you for the referral.     Use outcomes for the carrier/Plan on the flowsheet    Tereso Amezcau [facility]

## 2023-10-27 ENCOUNTER — PATIENT OUTREACH (OUTPATIENT)
Dept: CARE COORDINATION | Facility: CLINIC | Age: 73
End: 2023-10-27
Payer: COMMERCIAL

## 2023-10-27 NOTE — PROGRESS NOTES
"[facility] Services  ACO Population - Proactive Outreach  Unable to Reach    Background: Patient outreach conducted proactively to support health maintenance initiatives as part of [facility] ACO Population. RN Care Coordinator making proactive outreach to support patient in scheduling annual wellness exam and offer additional education in identified, overdue preventative health maintenance.    Outreach not attempted as Communication Information note states \"Genaro has switched to [facility] and wants no more outreach calls\".     Plan:  Care Coordinator will do no further outreaches at this time.    Shelby Glass RN  [facility]  "

## 2023-11-05 ENCOUNTER — APPOINTMENT (OUTPATIENT)
Dept: GENERAL RADIOLOGY | Facility: CLINIC | Age: 73
DRG: 871 | End: 2023-11-05
Attending: FAMILY MEDICINE
Payer: COMMERCIAL

## 2023-11-05 ENCOUNTER — HOSPITAL ENCOUNTER (INPATIENT)
Facility: CLINIC | Age: 73
LOS: 3 days | Discharge: HOME | DRG: 871 | End: 2023-11-08
Attending: FAMILY MEDICINE | Admitting: INTERNAL MEDICINE
Payer: COMMERCIAL

## 2023-11-05 DIAGNOSIS — I73.9 PERIPHERAL VASCULAR DISEASE, UNSPECIFIED (H): ICD-10-CM

## 2023-11-05 DIAGNOSIS — I25.10 CORONARY ARTERY DISEASE INVOLVING NATIVE CORONARY ARTERY OF NATIVE HEART WITHOUT ANGINA PECTORIS: ICD-10-CM

## 2023-11-05 DIAGNOSIS — K21.9 GASTROESOPHAGEAL REFLUX DISEASE WITHOUT ESOPHAGITIS: ICD-10-CM

## 2023-11-05 DIAGNOSIS — Z87.891 PERSONAL HISTORY OF TOBACCO USE, PRESENTING HAZARDS TO HEALTH: Primary | ICD-10-CM

## 2023-11-05 DIAGNOSIS — J15.9 COMMUNITY ACQUIRED BACTERIAL PNEUMONIA: ICD-10-CM

## 2023-11-05 DIAGNOSIS — E11.59 TYPE 2 DIABETES MELLITUS WITH OTHER CIRCULATORY COMPLICATION, WITHOUT LONG-TERM CURRENT USE OF INSULIN (H): ICD-10-CM

## 2023-11-05 DIAGNOSIS — A41.9 SEPSIS WITHOUT ACUTE ORGAN DYSFUNCTION, DUE TO UNSPECIFIED ORGANISM (H): ICD-10-CM

## 2023-11-05 LAB
ALBUMIN SERPL BCG-MCNC: 3.9 G/DL (ref 3.5–5.2)
ALBUMIN UR-MCNC: 30 MG/DL
ALP SERPL-CCNC: 73 U/L (ref 40–129)
ALT SERPL W P-5'-P-CCNC: 19 U/L (ref 0–70)
ANION GAP SERPL CALCULATED.3IONS-SCNC: 20 MMOL/L (ref 7–15)
APPEARANCE UR: CLEAR
AST SERPL W P-5'-P-CCNC: 21 U/L (ref 0–45)
BASOPHILS # BLD AUTO: 0.1 10E3/UL (ref 0–0.2)
BASOPHILS NFR BLD AUTO: 0 %
BILIRUB SERPL-MCNC: 1.6 MG/DL
BILIRUB UR QL STRIP: NEGATIVE
BUN SERPL-MCNC: 22.5 MG/DL (ref 8–23)
CALCIUM SERPL-MCNC: 9.1 MG/DL (ref 8.8–10.2)
CHLORIDE SERPL-SCNC: 97 MMOL/L (ref 98–107)
COLOR UR AUTO: YELLOW
CREAT SERPL-MCNC: 1.18 MG/DL (ref 0.67–1.17)
DEPRECATED HCO3 PLAS-SCNC: 17 MMOL/L (ref 22–29)
EGFRCR SERPLBLD CKD-EPI 2021: 64 ML/MIN/1.73M2
EOSINOPHIL # BLD AUTO: 0.1 10E3/UL (ref 0–0.7)
EOSINOPHIL NFR BLD AUTO: 0 %
ERYTHROCYTE [DISTWIDTH] IN BLOOD BY AUTOMATED COUNT: 12.4 % (ref 10–15)
FLUAV RNA SPEC QL NAA+PROBE: NEGATIVE
FLUBV RNA RESP QL NAA+PROBE: NEGATIVE
GLUCOSE SERPL-MCNC: 198 MG/DL (ref 70–99)
GLUCOSE UR STRIP-MCNC: >499 MG/DL
HCT VFR BLD AUTO: 38 % (ref 40–53)
HGB BLD-MCNC: 13.1 G/DL (ref 13.3–17.7)
HGB UR QL STRIP: ABNORMAL
IMM GRANULOCYTES # BLD: 0.1 10E3/UL
IMM GRANULOCYTES NFR BLD: 1 %
KETONES UR STRIP-MCNC: 20 MG/DL
LACTATE SERPL-SCNC: 2.4 MMOL/L (ref 0.7–2)
LEUKOCYTE ESTERASE UR QL STRIP: NEGATIVE
LYMPHOCYTES # BLD AUTO: 0.9 10E3/UL (ref 0.8–5.3)
LYMPHOCYTES NFR BLD AUTO: 5 %
MCH RBC QN AUTO: 31.2 PG (ref 26.5–33)
MCHC RBC AUTO-ENTMCNC: 34.5 G/DL (ref 31.5–36.5)
MCV RBC AUTO: 91 FL (ref 78–100)
MONOCYTES # BLD AUTO: 1.7 10E3/UL (ref 0–1.3)
MONOCYTES NFR BLD AUTO: 9 %
MUCOUS THREADS #/AREA URNS LPF: PRESENT /LPF
NEUTROPHILS # BLD AUTO: 16.9 10E3/UL (ref 1.6–8.3)
NEUTROPHILS NFR BLD AUTO: 85 %
NITRATE UR QL: NEGATIVE
NRBC # BLD AUTO: 0 10E3/UL
NRBC BLD AUTO-RTO: 0 /100
NT-PROBNP SERPL-MCNC: 843 PG/ML (ref 0–900)
PH UR STRIP: 5 [PH] (ref 5–7)
PLATELET # BLD AUTO: 207 10E3/UL (ref 150–450)
POTASSIUM SERPL-SCNC: 4.3 MMOL/L (ref 3.4–5.3)
PROT SERPL-MCNC: 7.2 G/DL (ref 6.4–8.3)
RBC # BLD AUTO: 4.2 10E6/UL (ref 4.4–5.9)
RBC URINE: <1 /HPF
RSV RNA SPEC NAA+PROBE: NEGATIVE
SARS-COV-2 RNA RESP QL NAA+PROBE: NEGATIVE
SODIUM SERPL-SCNC: 134 MMOL/L (ref 135–145)
SP GR UR STRIP: 1.02 (ref 1–1.03)
TROPONIN T SERPL HS-MCNC: 18 NG/L
UROBILINOGEN UR STRIP-MCNC: 2 MG/DL
WBC # BLD AUTO: 19.8 10E3/UL (ref 4–11)
WBC URINE: 0 /HPF

## 2023-11-05 PROCEDURE — 36415 COLL VENOUS BLD VENIPUNCTURE: CPT | Performed by: FAMILY MEDICINE

## 2023-11-05 PROCEDURE — 84484 ASSAY OF TROPONIN QUANT: CPT | Performed by: FAMILY MEDICINE

## 2023-11-05 PROCEDURE — 250N000011 HC RX IP 250 OP 636: Mod: JZ | Performed by: FAMILY MEDICINE

## 2023-11-05 PROCEDURE — 258N000003 HC RX IP 258 OP 636: Performed by: FAMILY MEDICINE

## 2023-11-05 PROCEDURE — 87040 BLOOD CULTURE FOR BACTERIA: CPT | Performed by: FAMILY MEDICINE

## 2023-11-05 PROCEDURE — 96360 HYDRATION IV INFUSION INIT: CPT | Performed by: FAMILY MEDICINE

## 2023-11-05 PROCEDURE — 93005 ELECTROCARDIOGRAM TRACING: CPT | Performed by: FAMILY MEDICINE

## 2023-11-05 PROCEDURE — 84145 PROCALCITONIN (PCT): CPT | Performed by: FAMILY MEDICINE

## 2023-11-05 PROCEDURE — 99291 CRITICAL CARE FIRST HOUR: CPT | Performed by: FAMILY MEDICINE

## 2023-11-05 PROCEDURE — 120N000001 HC R&B MED SURG/OB

## 2023-11-05 PROCEDURE — 99291 CRITICAL CARE FIRST HOUR: CPT | Mod: 25 | Performed by: FAMILY MEDICINE

## 2023-11-05 PROCEDURE — 83605 ASSAY OF LACTIC ACID: CPT | Performed by: FAMILY MEDICINE

## 2023-11-05 PROCEDURE — 83880 ASSAY OF NATRIURETIC PEPTIDE: CPT | Performed by: FAMILY MEDICINE

## 2023-11-05 PROCEDURE — 87637 SARSCOV2&INF A&B&RSV AMP PRB: CPT | Performed by: FAMILY MEDICINE

## 2023-11-05 PROCEDURE — 96361 HYDRATE IV INFUSION ADD-ON: CPT | Performed by: FAMILY MEDICINE

## 2023-11-05 PROCEDURE — 81001 URINALYSIS AUTO W/SCOPE: CPT | Performed by: FAMILY MEDICINE

## 2023-11-05 PROCEDURE — 85018 HEMOGLOBIN: CPT | Performed by: FAMILY MEDICINE

## 2023-11-05 PROCEDURE — 71045 X-RAY EXAM CHEST 1 VIEW: CPT

## 2023-11-05 PROCEDURE — 80053 COMPREHEN METABOLIC PANEL: CPT | Performed by: FAMILY MEDICINE

## 2023-11-05 PROCEDURE — 250N000013 HC RX MED GY IP 250 OP 250 PS 637: Performed by: FAMILY MEDICINE

## 2023-11-05 RX ORDER — CEFTRIAXONE 2 G/1
2 INJECTION, POWDER, FOR SOLUTION INTRAMUSCULAR; INTRAVENOUS ONCE
Status: COMPLETED | OUTPATIENT
Start: 2023-11-05 | End: 2023-11-06

## 2023-11-05 RX ORDER — ACETAMINOPHEN 325 MG/1
650 TABLET ORAL ONCE
Status: COMPLETED | OUTPATIENT
Start: 2023-11-05 | End: 2023-11-05

## 2023-11-05 RX ORDER — AZITHROMYCIN 500 MG/1
500 INJECTION, POWDER, LYOPHILIZED, FOR SOLUTION INTRAVENOUS EVERY 24 HOURS
Status: DISCONTINUED | OUTPATIENT
Start: 2023-11-05 | End: 2023-11-06

## 2023-11-05 RX ADMIN — SODIUM CHLORIDE 1000 ML: 9 INJECTION, SOLUTION INTRAVENOUS at 22:50

## 2023-11-05 RX ADMIN — CEFTRIAXONE SODIUM 2 G: 2 INJECTION, POWDER, FOR SOLUTION INTRAMUSCULAR; INTRAVENOUS at 23:45

## 2023-11-05 RX ADMIN — ACETAMINOPHEN 650 MG: 325 TABLET, FILM COATED ORAL at 22:53

## 2023-11-05 ASSESSMENT — ACTIVITIES OF DAILY LIVING (ADL): ADLS_ACUITY_SCORE: 35

## 2023-11-06 ENCOUNTER — APPOINTMENT (OUTPATIENT)
Dept: PHYSICAL THERAPY | Facility: CLINIC | Age: 73
DRG: 871 | End: 2023-11-06
Attending: INTERNAL MEDICINE
Payer: COMMERCIAL

## 2023-11-06 LAB
ANION GAP SERPL CALCULATED.3IONS-SCNC: 13 MMOL/L (ref 7–15)
ANION GAP SERPL CALCULATED.3IONS-SCNC: 16 MMOL/L (ref 7–15)
B-OH-BUTYR SERPL-SCNC: 0.6 MMOL/L
B-OH-BUTYR SERPL-SCNC: 1.8 MMOL/L
BASE EXCESS BLDV CALC-SCNC: -0.9 MMOL/L (ref -7.7–1.9)
BASOPHILS # BLD AUTO: 0.1 10E3/UL (ref 0–0.2)
BASOPHILS NFR BLD AUTO: 0 %
BUN SERPL-MCNC: 20 MG/DL (ref 8–23)
BUN SERPL-MCNC: 20.9 MG/DL (ref 8–23)
CALCIUM SERPL-MCNC: 8.4 MG/DL (ref 8.8–10.2)
CALCIUM SERPL-MCNC: 8.9 MG/DL (ref 8.8–10.2)
CHLORIDE SERPL-SCNC: 103 MMOL/L (ref 98–107)
CHLORIDE SERPL-SCNC: 104 MMOL/L (ref 98–107)
CREAT SERPL-MCNC: 0.98 MG/DL (ref 0.67–1.17)
CREAT SERPL-MCNC: 1.01 MG/DL (ref 0.67–1.17)
DEPRECATED HCO3 PLAS-SCNC: 19 MMOL/L (ref 22–29)
DEPRECATED HCO3 PLAS-SCNC: 21 MMOL/L (ref 22–29)
EGFRCR SERPLBLD CKD-EPI 2021: 78 ML/MIN/1.73M2
EGFRCR SERPLBLD CKD-EPI 2021: 80 ML/MIN/1.73M2
EOSINOPHIL # BLD AUTO: 0 10E3/UL (ref 0–0.7)
EOSINOPHIL NFR BLD AUTO: 0 %
ERYTHROCYTE [DISTWIDTH] IN BLOOD BY AUTOMATED COUNT: 12.6 % (ref 10–15)
GLUCOSE BLDC GLUCOMTR-MCNC: 140 MG/DL (ref 70–99)
GLUCOSE BLDC GLUCOMTR-MCNC: 171 MG/DL (ref 70–99)
GLUCOSE BLDC GLUCOMTR-MCNC: 177 MG/DL (ref 70–99)
GLUCOSE BLDC GLUCOMTR-MCNC: 196 MG/DL (ref 70–99)
GLUCOSE BLDC GLUCOMTR-MCNC: 86 MG/DL (ref 70–99)
GLUCOSE SERPL-MCNC: 128 MG/DL (ref 70–99)
GLUCOSE SERPL-MCNC: 184 MG/DL (ref 70–99)
HCO3 BLDV-SCNC: 25 MMOL/L (ref 21–28)
HCT VFR BLD AUTO: 35.8 % (ref 40–53)
HGB BLD-MCNC: 12 G/DL (ref 13.3–17.7)
IMM GRANULOCYTES # BLD: 0.1 10E3/UL
IMM GRANULOCYTES NFR BLD: 1 %
LACTATE SERPL-SCNC: 1 MMOL/L (ref 0.7–2)
LACTATE SERPL-SCNC: 1.2 MMOL/L (ref 0.7–2)
LYMPHOCYTES # BLD AUTO: 1.6 10E3/UL (ref 0.8–5.3)
LYMPHOCYTES NFR BLD AUTO: 10 %
MCH RBC QN AUTO: 31 PG (ref 26.5–33)
MCHC RBC AUTO-ENTMCNC: 33.5 G/DL (ref 31.5–36.5)
MCV RBC AUTO: 93 FL (ref 78–100)
MONOCYTES # BLD AUTO: 1.5 10E3/UL (ref 0–1.3)
MONOCYTES NFR BLD AUTO: 9 %
NEUTROPHILS # BLD AUTO: 13.6 10E3/UL (ref 1.6–8.3)
NEUTROPHILS NFR BLD AUTO: 80 %
NRBC # BLD AUTO: 0 10E3/UL
NRBC BLD AUTO-RTO: 0 /100
O2/TOTAL GAS SETTING VFR VENT: 21 %
PCO2 BLDV: 43 MM HG (ref 40–50)
PH BLDV: 7.37 [PH] (ref 7.32–7.43)
PLATELET # BLD AUTO: 194 10E3/UL (ref 150–450)
PO2 BLDV: 24 MM HG (ref 25–47)
POTASSIUM SERPL-SCNC: 4 MMOL/L (ref 3.4–5.3)
POTASSIUM SERPL-SCNC: 4.1 MMOL/L (ref 3.4–5.3)
PROCALCITONIN SERPL IA-MCNC: 0.26 NG/ML
RBC # BLD AUTO: 3.87 10E6/UL (ref 4.4–5.9)
SODIUM SERPL-SCNC: 137 MMOL/L (ref 135–145)
SODIUM SERPL-SCNC: 139 MMOL/L (ref 135–145)
WBC # BLD AUTO: 16.9 10E3/UL (ref 4–11)

## 2023-11-06 PROCEDURE — 82010 KETONE BODYS QUAN: CPT | Performed by: FAMILY MEDICINE

## 2023-11-06 PROCEDURE — 80048 BASIC METABOLIC PNL TOTAL CA: CPT | Performed by: FAMILY MEDICINE

## 2023-11-06 PROCEDURE — 120N000001 HC R&B MED SURG/OB

## 2023-11-06 PROCEDURE — 83605 ASSAY OF LACTIC ACID: CPT | Performed by: FAMILY MEDICINE

## 2023-11-06 PROCEDURE — 250N000011 HC RX IP 250 OP 636: Performed by: FAMILY MEDICINE

## 2023-11-06 PROCEDURE — 250N000011 HC RX IP 250 OP 636: Mod: JZ | Performed by: INTERNAL MEDICINE

## 2023-11-06 PROCEDURE — 250N000012 HC RX MED GY IP 250 OP 636 PS 637: Performed by: INTERNAL MEDICINE

## 2023-11-06 PROCEDURE — 82803 BLOOD GASES ANY COMBINATION: CPT | Performed by: FAMILY MEDICINE

## 2023-11-06 PROCEDURE — 83605 ASSAY OF LACTIC ACID: CPT | Performed by: INTERNAL MEDICINE

## 2023-11-06 PROCEDURE — 97530 THERAPEUTIC ACTIVITIES: CPT | Mod: GP | Performed by: PHYSICAL THERAPIST

## 2023-11-06 PROCEDURE — 99222 1ST HOSP IP/OBS MODERATE 55: CPT | Mod: 95 | Performed by: INTERNAL MEDICINE

## 2023-11-06 PROCEDURE — 85025 COMPLETE CBC W/AUTO DIFF WBC: CPT | Performed by: INTERNAL MEDICINE

## 2023-11-06 PROCEDURE — 82310 ASSAY OF CALCIUM: CPT | Performed by: INTERNAL MEDICINE

## 2023-11-06 PROCEDURE — 36415 COLL VENOUS BLD VENIPUNCTURE: CPT | Performed by: FAMILY MEDICINE

## 2023-11-06 PROCEDURE — 87205 SMEAR GRAM STAIN: CPT | Performed by: INTERNAL MEDICINE

## 2023-11-06 PROCEDURE — 250N000013 HC RX MED GY IP 250 OP 250 PS 637: Performed by: INTERNAL MEDICINE

## 2023-11-06 PROCEDURE — 36415 COLL VENOUS BLD VENIPUNCTURE: CPT | Performed by: INTERNAL MEDICINE

## 2023-11-06 PROCEDURE — 97161 PT EVAL LOW COMPLEX 20 MIN: CPT | Mod: GP | Performed by: PHYSICAL THERAPIST

## 2023-11-06 PROCEDURE — 258N000003 HC RX IP 258 OP 636: Performed by: INTERNAL MEDICINE

## 2023-11-06 RX ORDER — PANTOPRAZOLE SODIUM 40 MG/1
40 TABLET, DELAYED RELEASE ORAL DAILY
Status: DISCONTINUED | OUTPATIENT
Start: 2023-11-06 | End: 2023-11-08 | Stop reason: HOSPADM

## 2023-11-06 RX ORDER — ONDANSETRON 2 MG/ML
4 INJECTION INTRAMUSCULAR; INTRAVENOUS EVERY 6 HOURS PRN
Status: DISCONTINUED | OUTPATIENT
Start: 2023-11-06 | End: 2023-11-08 | Stop reason: HOSPADM

## 2023-11-06 RX ORDER — ACETAMINOPHEN 650 MG/1
650 SUPPOSITORY RECTAL EVERY 6 HOURS PRN
Status: DISCONTINUED | OUTPATIENT
Start: 2023-11-06 | End: 2023-11-08 | Stop reason: HOSPADM

## 2023-11-06 RX ORDER — AZITHROMYCIN 500 MG/1
500 INJECTION, POWDER, LYOPHILIZED, FOR SOLUTION INTRAVENOUS EVERY 24 HOURS
Status: COMPLETED | OUTPATIENT
Start: 2023-11-06 | End: 2023-11-07

## 2023-11-06 RX ORDER — ACETAMINOPHEN 325 MG/1
650 TABLET ORAL EVERY 6 HOURS PRN
Status: DISCONTINUED | OUTPATIENT
Start: 2023-11-06 | End: 2023-11-08 | Stop reason: HOSPADM

## 2023-11-06 RX ORDER — CEFTRIAXONE 2 G/1
2 INJECTION, POWDER, FOR SOLUTION INTRAMUSCULAR; INTRAVENOUS EVERY 24 HOURS
Status: DISCONTINUED | OUTPATIENT
Start: 2023-11-06 | End: 2023-11-08

## 2023-11-06 RX ORDER — ROSUVASTATIN CALCIUM 20 MG/1
40 TABLET, COATED ORAL DAILY
Status: DISCONTINUED | OUTPATIENT
Start: 2023-11-06 | End: 2023-11-08 | Stop reason: HOSPADM

## 2023-11-06 RX ORDER — ENOXAPARIN SODIUM 100 MG/ML
40 INJECTION SUBCUTANEOUS EVERY 24 HOURS
Status: DISCONTINUED | OUTPATIENT
Start: 2023-11-06 | End: 2023-11-08 | Stop reason: HOSPADM

## 2023-11-06 RX ORDER — ONDANSETRON 4 MG/1
4 TABLET, ORALLY DISINTEGRATING ORAL EVERY 6 HOURS PRN
Status: DISCONTINUED | OUTPATIENT
Start: 2023-11-06 | End: 2023-11-08 | Stop reason: HOSPADM

## 2023-11-06 RX ORDER — GUAIFENESIN/DEXTROMETHORPHAN 100-10MG/5
10 SYRUP ORAL EVERY 4 HOURS PRN
Status: DISCONTINUED | OUTPATIENT
Start: 2023-11-06 | End: 2023-11-08 | Stop reason: HOSPADM

## 2023-11-06 RX ORDER — METOPROLOL TARTRATE 50 MG
50 TABLET ORAL 2 TIMES DAILY
Status: DISCONTINUED | OUTPATIENT
Start: 2023-11-06 | End: 2023-11-08 | Stop reason: HOSPADM

## 2023-11-06 RX ORDER — CEFTRIAXONE 2 G/1
2 INJECTION, POWDER, FOR SOLUTION INTRAMUSCULAR; INTRAVENOUS ONCE
Status: DISCONTINUED | OUTPATIENT
Start: 2023-11-06 | End: 2023-11-06

## 2023-11-06 RX ORDER — DEXTROSE MONOHYDRATE 25 G/50ML
25-50 INJECTION, SOLUTION INTRAVENOUS
Status: DISCONTINUED | OUTPATIENT
Start: 2023-11-06 | End: 2023-11-08 | Stop reason: HOSPADM

## 2023-11-06 RX ORDER — ASPIRIN 81 MG/1
81 TABLET ORAL DAILY
Status: DISCONTINUED | OUTPATIENT
Start: 2023-11-06 | End: 2023-11-08 | Stop reason: HOSPADM

## 2023-11-06 RX ORDER — NICOTINE POLACRILEX 4 MG
15-30 LOZENGE BUCCAL
Status: DISCONTINUED | OUTPATIENT
Start: 2023-11-06 | End: 2023-11-08 | Stop reason: HOSPADM

## 2023-11-06 RX ADMIN — METOPROLOL TARTRATE 50 MG: 50 TABLET, FILM COATED ORAL at 20:11

## 2023-11-06 RX ADMIN — CEFTRIAXONE SODIUM 2 G: 2 INJECTION, POWDER, FOR SOLUTION INTRAMUSCULAR; INTRAVENOUS at 20:11

## 2023-11-06 RX ADMIN — ROSUVASTATIN CALCIUM 40 MG: 20 TABLET, FILM COATED ORAL at 08:18

## 2023-11-06 RX ADMIN — METOPROLOL TARTRATE 50 MG: 50 TABLET, FILM COATED ORAL at 08:19

## 2023-11-06 RX ADMIN — AZITHROMYCIN MONOHYDRATE 500 MG: 500 INJECTION, POWDER, LYOPHILIZED, FOR SOLUTION INTRAVENOUS at 21:02

## 2023-11-06 RX ADMIN — INSULIN ASPART 1 UNITS: 100 INJECTION, SOLUTION INTRAVENOUS; SUBCUTANEOUS at 18:15

## 2023-11-06 RX ADMIN — ENOXAPARIN SODIUM 40 MG: 40 INJECTION SUBCUTANEOUS at 08:21

## 2023-11-06 RX ADMIN — ASPIRIN 81 MG: 81 TABLET ORAL at 08:19

## 2023-11-06 RX ADMIN — PANTOPRAZOLE SODIUM 40 MG: 40 TABLET, DELAYED RELEASE ORAL at 08:20

## 2023-11-06 RX ADMIN — ACETAMINOPHEN 650 MG: 325 TABLET, FILM COATED ORAL at 20:10

## 2023-11-06 RX ADMIN — AZITHROMYCIN MONOHYDRATE 500 MG: 500 INJECTION, POWDER, LYOPHILIZED, FOR SOLUTION INTRAVENOUS at 00:20

## 2023-11-06 RX ADMIN — INSULIN ASPART 2 UNITS: 100 INJECTION, SOLUTION INTRAVENOUS; SUBCUTANEOUS at 12:56

## 2023-11-06 RX ADMIN — SODIUM CHLORIDE 1000 ML: 9 INJECTION, SOLUTION INTRAVENOUS at 02:34

## 2023-11-06 RX ADMIN — ONDANSETRON 4 MG: 4 TABLET, ORALLY DISINTEGRATING ORAL at 22:24

## 2023-11-06 ASSESSMENT — ACTIVITIES OF DAILY LIVING (ADL)
DOING_ERRANDS_INDEPENDENTLY_DIFFICULTY: NO
TOILETING_ISSUES: NO
WEAR_GLASSES_OR_BLIND: NO
NUMBER_OF_TIMES_PATIENT_HAS_FALLEN_WITHIN_LAST_SIX_MONTHS: 1
ADLS_ACUITY_SCORE: 20
DRESSING/BATHING_DIFFICULTY: NO
ADLS_ACUITY_SCORE: 21
ADLS_ACUITY_SCORE: 18
ADLS_ACUITY_SCORE: 21
ADLS_ACUITY_SCORE: 20
ADLS_ACUITY_SCORE: 20
CHANGE_IN_FUNCTIONAL_STATUS_SINCE_ONSET_OF_CURRENT_ILLNESS/INJURY: NO
ADLS_ACUITY_SCORE: 20
ADLS_ACUITY_SCORE: 20
ADLS_ACUITY_SCORE: 18
FALL_HISTORY_WITHIN_LAST_SIX_MONTHS: YES
DIFFICULTY_COMMUNICATING: NO
WALKING_OR_CLIMBING_STAIRS_DIFFICULTY: NO
HEARING_DIFFICULTY_OR_DEAF: NO
CONCENTRATING,_REMEMBERING_OR_MAKING_DECISIONS_DIFFICULTY: NO
ADLS_ACUITY_SCORE: 18
ADLS_ACUITY_SCORE: 20
ADLS_ACUITY_SCORE: 21
DIFFICULTY_EATING/SWALLOWING: NO

## 2023-11-06 NOTE — PROGRESS NOTES
Patient is a 73-year-old gentleman who was admitted earlier today with community-acquired pneumonia and severe weakness with inability to care for himself.  He has been started on Rocephin and azithromycin and has felt slight improvement since visitation to the emergency department and denies any new symptoms this evening.    H&P was reviewed as well as all labs and imaging.  In addition to the known community-acquired pneumonia it is found that patient had an elevated anion gap at time of admission - 20 with bicarb of 17.  No VBG to confirm metabolic acidosis.  Lactic acid was elevated at 2.4 so suspected lactic acidosis etiology however anion gap elevation continued at 16 this morning with bicarb of 19 on BMP despite lactic acid normalizing.  Ketones added on to blood work performed this morning have returned elevated at 1.80.  Blood sugars stable and overall at goal without signs of DKA.  Patient enforces not eating or drinking much in recent days secondary to feeling so poorly and the degree of weakness he was having.  Suspect starvation ketoacidosis was initially present as well as known lactic acidosis.      Plan:  -Continue IV Rocephin and azithromycin for treatment of community-acquired pneumonia  -Recheck lactic acid and perform VBG now however anticipate both will be normal at this time  -Recheck BMP and ketones this evening at 1800 with further monitoring as appropriate thereafter  -Continue IV fluids and encourage increased p.o. intake  -Appreciate physical therapy's recommendations    Electronically Signed:  Katie Gabriel MD

## 2023-11-06 NOTE — ED NOTES
Bed: ED01  Expected date:   Expected time:   Means of arrival:   Comments:  [facility]  74 y/o M  Weakness

## 2023-11-06 NOTE — PLAN OF CARE
Goal Outcome Evaluation:      Plan of Care Reviewed With: patient    Overall Patient Progress: improving    Outcome Evaluation: A & O x4, able to make needs known.  Takes pills whole.  VSS.  On RA w SpO2 > 90%, lungs coarse with inspiratory and expiratory wheezes.  Tele on, heart in NS.  RAC frankie IV patent, pt received dose of IV Azythromycin and 1L NS bolus.  Cont. BB, +1 assist w gait belt to chair, uses urinal at bedside.  No c/o pain throughout shift.  Abraisions to L shin and L elbow, cleansed and foam dressings c/d/i.  Has old sternal scar.    /65 (BP Location: Left arm, Patient Position: Semi-Wallace's)   Pulse 80   Temp 98.4  F (36.9  C) (Oral)   Resp 18   Ht 1.829 m (6')   Wt 86.5 kg (190 lb 12.8 oz)   SpO2 92%   BMI 25.88 kg/m        Oneyda Simmons RN on 11/6/2023 at 5:47 AM
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
## 2023-11-06 NOTE — PROGRESS NOTES
11/06/23 1000   Appointment Info   Signing Clinician's Name / Credentials (PT) Jennifer Rothman PT, DPT, ATC, LAT   Rehab Comments (PT) PT eval and treat weakness, discharge recs. Activity orders: ambulate with assist       Present no   Living Environment   People in Home spouse   Current Living Arrangements apartment   Home Accessibility   (elevator access to third floor)   Transportation Anticipated family or friend will provide  (son able to transport)   Living Environment Comments assists wife at baseline- history of CVA with patient providing assistance at baseline- mobility safety, showering. flat bed. 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 Jaycee Wang   Patient/Family Therapy Goals Statement (PT) I can't go home yet   Pertinent History of Current Problem (include personal factors and/or comorbidities that impact the POC) Patient is a 73 year old male, presented due to cough and weakness, status post fall in his bathroom. Patient found to have pneumonia and sepsis. Patient with a previous medical history of PVD, CKD, AFib, DM type 2, CAD, HTN, HLD, GERD, BPPV.   Existing Precautions/Restrictions fall   Weight-Bearing Status - LUE full weight-bearing   Weight-Bearing Status - RUE full weight-bearing   Weight-Bearing Status - LLE full weight-bearing   Weight-Bearing Status - RLE full weight-bearing   General Observations on room air, fearful of falling and resistant to assessment   Cognition   Affect/Mental  Status (Cognition) anxious   Orientation Status (Cognition) oriented to;person;place;situation;time   Follows Commands (Cognition) WFL   Cognitive Status Comments aggitated and frustated with questions.   Pain Assessment   Patient Currently in Pain No   Integumentary/Edema   Integumentary/Edema Comments left shin and left elbow with dressings inplace. no swelling noted at left knee   Posture    Posture Forward head position   Range of Motion (ROM)   Range of Motion ROM is WFL   Strength (Manual Muscle Testing)   Strength (Manual Muscle Testing) Able to perform R SLR;Able to perform L SLR   Strength Comments notes feeling weak however completed 10 time sit to stand with minimal use of hands and good LE endurance   Bed Mobility   Bed Mobility supine-sit;sit-supine   Supine-Sit [place] (Bed Mobility) independent   Sit-Supine [place] (Bed Mobility) independent   Transfers   Transfers sit-stand transfer   Sit-Stand Transfer   Sit-Stand [place] (Transfers) independent   Gait/Stairs (Locomotion)   [place] Level (Gait) supervision   Assistive Device (Gait) walker, front-wheeled   Distance in Feet (Gait) 190   Pattern (Gait) step-to   Deviations/Abnormal Patterns (Gait) left sided deviations;antalgic;base of support, narrow;gait speed decreased;stride length decreased   Left Sided Gait Deviations   (let knee painful with flexed in standin and decreased stance phase)   Balance   Balance Comments standing balance 30 seconds, narrow base of support without hands, no sway. seeking walker support for ambulation due to left knee pain   Sensory Examination   Sensory Perception patient reports no sensory changes   Coordination   Coordination no deficits were identified   Muscle Tone   Muscle Tone no deficits were identified   Clinical Impression   Criteria for Skilled Therapeutic Intervention Yes, treatment indicated   PT Diagnosis (PT) impaired mobility   Influenced by the following impairments left knee pain,  fear of falling   Functional limitations due to impairments use of walker to decrease left knee pain   Clinical Presentation (PT Evaluation Complexity) stable   Clinical Presentation Rationale improving strength and safety with mobility, medical status, clinical judgement   Clinical Decision Making (Complexity) low complexity   Planned Therapy Interventions (PT) gait training;strengthening;patient/family education;progressive activity/exercise   Risk & Benefits of therapy have been explained evaluation/treatment results reviewed;participants voiced agreement with care plan;participants included;patient   Clinical Impression Comments Currently patient demonstrates functional mobility without safety concerns, however due to left knee pain requiring the support of the walker. Anticipate with continued mobility and pain control patient will make good progress towards returning home.   PT Total Evaluation Time   PT Eval, Low Complexity Minutes (80453) 30   Physical Therapy Goals   PT Frequency 3x/week   PT Predicted Duration/Target Date for Goal Attainment 11/08/23   PT Goals Gait   PT: Gait Independent;100 feet   PT Discharge Planning   PT Plan 1/3 Mon. Ambulate without walker   PT Discharge Recommendation (DC Rec) home with assist   PT Rationale for DC Rec Patient from apartment, elevator access, sons are able to assist at discharge. He has a walker available at his [facility] if needed. Currently patient demonstrates functional mobility without safety concerns, however due to left knee pain requiring the support of the walker. Anticipate with continued mobility and pain control patient will make good progress towards returning home.   PT Brief overview of current status IND bed mobility. IND sit to stand. SBA ambulation 190' with walker.   Total Session Time   Total Session Time (sum of timed and untimed services) 30      11/06/23 1000   Appointment Info   Signing Clinician's Name / Credentials (PT) Jennifer Rothman  PT, DPT, ATC, LAT   Rehab Comments (PT) PT eval and treat weakness, discharge recs. Activity orders: ambulate with assist       Present no   Living Environment   People in Home spouse   Current Living Arrangements apartment   Home Accessibility   (elevator access to third floor)   Transportation Anticipated family or friend will provide  (son able to transport)   Living Environment Comments assists wife at baseline- history of CVA with patient providing assistance at baseline- mobility safety, showering. flat bed. 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 Jaycee aWng   Patient/Family Therapy Goals Statement (PT) I can't go home yet   Pertinent History of Current Problem (include personal factors and/or comorbidities that impact the POC) Patient is a 73 year old male, presented due to cough and weakness, status post fall in his bathroom. Patient found to have pneumonia and sepsis. Patient with a previous medical history of PVD, CKD, AFib, DM type 2, CAD, HTN, HLD, GERD, BPPV.   Existing Precautions/Restrictions fall   Weight-Bearing Status - LUE full weight-bearing   Weight-Bearing Status - RUE full weight-bearing   Weight-Bearing Status - LLE full weight-bearing   Weight-Bearing Status - RLE full weight-bearing   General Observations on room air, fearful of falling and resistant to assessment   Cognition   Affect/Mental Status (Cognition) anxious   Orientation Status (Cognition) oriented to;person;place;situation;time    Follows Commands (Cognition) WFL   Cognitive Status Comments aggitated and frustated with questions.   Pain Assessment   Patient Currently in Pain No   Integumentary/Edema   Integumentary/Edema Comments left shin and left elbow with dressings inplace. no swelling noted at left knee   Posture    Posture Forward head position   Range of Motion (ROM)   Range of Motion ROM is WFL   Strength (Manual Muscle Testing)   Strength (Manual Muscle Testing) Able to perform R SLR;Able to perform L SLR   Strength Comments notes feeling weak however completed 10 time sit to stand with minimal use of hands and good LE endurance   Bed Mobility   Bed Mobility supine-sit;sit-supine   Supine-Sit [place] (Bed Mobility) independent   Sit-Supine [place] (Bed Mobility) independent   Transfers   Transfers sit-stand transfer   Sit-Stand Transfer   Sit-Stand [place] (Transfers) independent   Gait/Stairs (Locomotion)   [place] Level (Gait) supervision   Assistive Device (Gait) walker, front-wheeled   Distance in Feet (Gait) 190   Pattern (Gait) step-to   Deviations/Abnormal Patterns (Gait) left sided deviations;antalgic;base of support, narrow;gait speed decreased;stride length decreased   Left Sided Gait Deviations   (let knee painful with flexed in standin and decreased stance phase)   Balance   Balance Comments standing balance 30 seconds, narrow base of support without hands, no sway. seeking walker support for ambulation due to left knee pain   Sensory Examination   Sensory Perception patient reports no sensory changes   Coordination   Coordination no deficits were identified   Muscle Tone   Muscle Tone no deficits were identified   Clinical Impression   Criteria for Skilled Therapeutic Intervention Yes, treatment indicated   PT Diagnosis (PT) impaired mobility   Influenced by the following impairments left knee pain, fear of falling   Functional limitations due to impairments use of walker to decrease left knee pain    Clinical Presentation (PT Evaluation Complexity) stable   Clinical Presentation Rationale improving strength and safety with mobility, medical status, clinical judgement   Clinical Decision Making (Complexity) low complexity   Planned Therapy Interventions (PT) gait training;strengthening;patient/family education;progressive activity/exercise   Risk & Benefits of therapy have been explained evaluation/treatment results reviewed;participants voiced agreement with care plan;participants included;patient   Clinical Impression Comments Currently patient demonstrates functional mobility without safety concerns, however due to left knee pain requiring the support of the walker. Anticipate with continued mobility and pain control patient will make good progress towards returning home.   PT Total Evaluation Time   PT Eval, Low Complexity Minutes (65141) 30   Physical Therapy Goals   PT Frequency 3x/week   PT Predicted Duration/Target Date for Goal Attainment 11/08/23   PT Goals Gait   PT: Gait Independent;100 feet   PT Discharge Planning   PT Plan 1/3 Mon. Ambulate without walker   PT Discharge Recommendation (DC Rec) home with assist   PT Rationale for DC Rec Patient from apartment, elevator access, sons are able to assist at discharge. [place]. Currently patient demonstrates functional mobility without safety concerns, however due to left knee pain requiring the support of the walker. Anticipate with continued mobility and pain control patient will make good progress towards returning home.   PT Brief overview of current status IND bed mobility. IND sit to stand. SBA ambulation 190' with walker.   Total Session Time   Total Session Time (sum of timed and untimed services) 30     Thank you for your referral.  Jennifer Rothman, PT, DPT, ATC, LAT    [facility]  O: [phone]  E: [email or web address]

## 2023-11-06 NOTE — MEDICATION SCRIBE - ADMISSION MEDICATION HISTORY
Medication Scribe Admission Medication History    Admission medication history is complete. The information provided in this note is only as accurate as the sources available at the time of the update.    Information Source(s): Patient and CareEverywhere/SureScripts via in-person    Pertinent Information: inpatient    Changes made to PTA medication list:  Added: None  Deleted: alcohol prep pads, victoza, needles, nitroglycerin SL, lisinopril, baby aspirin  Changed: BG testing to monthly, omeprazole to PRN    Medication Affordability:  Not including over the counter (OTC) medications, was there a time in the past 3 months when you did not take your medications as prescribed because of cost?: Unable to Assess    Allergies reviewed with patient and updates made in EHR: yes    Medication History Completed By: STEWART JOSHI 11/6/2023 1:51 PM    PTA Med List   Medication Sig Last Dose    blood glucose (NO BRAND SPECIFIED) test strip Use to test blood sugar 2 times daily or as directed.  ACCU-CHEK JUAN (Patient taking differently: 1 strip by In Vitro route monthly or as directed.  ACCU-CHEK JUAN) Past Week at unkn    blood glucose monitoring (NO BRAND SPECIFIED) meter device kit Use to test blood sugar 2 times daily or as directed. (Patient taking differently: by In Vitro route every 30 days or as directed.) Past Week at unkn    empagliflozin (JARDIANCE) 25 MG TABS tablet Take 1 tablet (25 mg) by mouth daily 11/5/2023 at 0500    glipiZIDE (GLUCOTROL) 10 MG tablet Take 1 tablet (10 mg) by mouth 2 times daily (before meals) 11/5/2023 at 0500    metFORMIN (GLUCOPHAGE) 500 MG tablet Take 2 tablets (1,000 mg) by mouth 2 times daily (with meals) 11/5/2023 at 0500    metoprolol tartrate (LOPRESSOR) 50 MG tablet Take 1 tablet (50 mg) by mouth 2 times daily 11/5/2023 at 0500    omeprazole (PRILOSEC) 20 MG DR capsule Take 1 capsule (20 mg) by mouth daily (take 30 minutes prior to a meal) (Patient taking differently: Take 20 mg by  mouth daily as needed (heartburn) (take 30 minutes prior to a meal)) More than a month at unkn    rosuvastatin (CRESTOR) 40 MG tablet Take 1 tablet (40 mg) by mouth daily 11/5/2023 at 0500

## 2023-11-06 NOTE — ED TRIAGE NOTES
Pt reports cough x2 days. Increased weakness today. Pt was walking to the bathroom and was too weak to walk, lowered self to the floor and could not get up. EMS called for lift assist. Temp 99.8     Triage Assessment (Adult)       Row Name 11/05/23 9005          Triage Assessment    Airway WDL WDL        Respiratory WDL    Respiratory WDL WDL        Skin Circulation/Temperature WDL    Skin Circulation/Temperature WDL WDL        Cardiac WDL    Cardiac WDL WDL        Peripheral/Neurovascular WDL    Peripheral Neurovascular WDL WDL        Cognitive/Neuro/Behavioral WDL    Cognitive/Neuro/Behavioral WDL WDL

## 2023-11-06 NOTE — PROGRESS NOTES
S-(situation): Patient arrives to room 255 via cart from ED RM 01    B-(background): Cough the last couple days.  Pt felt very weak evening of 11/5.  Having trouble getting out of his bed and then when he did and went to the bathroom, he fell on his butt and was unable to get up.  BIBA    A-(assessment): A & O x4, able to make needs known.  VSS.  No c/o pain.  Productive cough.  Able to transfer from cart to bed with +2 assist and gait belt.    R-(recommendations): Orders reviewed with patient. Will monitor patient per MD orders.     Inpatient nursing criteria listed below were met:    Health care directives status obtained and documented: No  VTE ordered/documented: No  Skin issues/needs documented:Yes  Isolation addressed and Signage used: Yes  Fall Prevention: Care plan updated Yes Education given and documented Yes  Care Plan initiated and Co-Morbidities added: Yes  Education Assessment documented:Yes  Admission Education Documented: Yes  If present CAUTI/CLABI Education done: NA  New medication patient education completed and documented (Possible Side Effects of Common Medications handout): Yes  Allergies Reviewed: Yes  Admission Medication Reconciliation completed: Yes  Home medications if not able to send immediately home with family stored here: NA  Reminder note placed in discharge instructions regarding home meds: NA  Individualized care needs/preferences addressed and charted: Yes  Provider Notified that patient has arrived to the unit: Yes    Oneyda Simmons RN on 11/6/2023 at 12:35 AM

## 2023-11-07 ENCOUNTER — APPOINTMENT (OUTPATIENT)
Dept: PHYSICAL THERAPY | Facility: CLINIC | Age: 73
DRG: 871 | End: 2023-11-07
Payer: COMMERCIAL

## 2023-11-07 ENCOUNTER — HOSPITAL ENCOUNTER (OUTPATIENT)
Facility: CLINIC | Age: 73
End: 2023-11-07
Attending: STUDENT IN AN ORGANIZED HEALTH CARE EDUCATION/TRAINING PROGRAM | Admitting: STUDENT IN AN ORGANIZED HEALTH CARE EDUCATION/TRAINING PROGRAM
Payer: COMMERCIAL

## 2023-11-07 PROBLEM — E88.89 KETOSIS (H): Status: ACTIVE | Noted: 2023-11-07

## 2023-11-07 PROBLEM — E87.29 HIGH ANION GAP METABOLIC ACIDOSIS: Status: ACTIVE | Noted: 2023-11-07

## 2023-11-07 PROBLEM — R79.89 ELEVATED LACTIC ACID LEVEL: Status: ACTIVE | Noted: 2023-11-07

## 2023-11-07 LAB
ANION GAP SERPL CALCULATED.3IONS-SCNC: 13 MMOL/L (ref 7–15)
B-OH-BUTYR SERPL-SCNC: 0.4 MMOL/L
B-OH-BUTYR SERPL-SCNC: 0.4 MMOL/L
B-OH-BUTYR SERPL-SCNC: 1.3 MMOL/L
BASE EXCESS BLDV CALC-SCNC: -1.6 MMOL/L (ref -7.7–1.9)
BUN SERPL-MCNC: 15.5 MG/DL (ref 8–23)
CALCIUM SERPL-MCNC: 9 MG/DL (ref 8.8–10.2)
CHLORIDE SERPL-SCNC: 105 MMOL/L (ref 98–107)
CREAT SERPL-MCNC: 0.86 MG/DL (ref 0.67–1.17)
DEPRECATED HCO3 PLAS-SCNC: 20 MMOL/L (ref 22–29)
EGFRCR SERPLBLD CKD-EPI 2021: 90 ML/MIN/1.73M2
ERYTHROCYTE [DISTWIDTH] IN BLOOD BY AUTOMATED COUNT: 12.5 % (ref 10–15)
GLUCOSE BLDC GLUCOMTR-MCNC: 145 MG/DL (ref 70–99)
GLUCOSE BLDC GLUCOMTR-MCNC: 149 MG/DL (ref 70–99)
GLUCOSE BLDC GLUCOMTR-MCNC: 222 MG/DL (ref 70–99)
GLUCOSE BLDC GLUCOMTR-MCNC: 225 MG/DL (ref 70–99)
GLUCOSE BLDC GLUCOMTR-MCNC: 299 MG/DL (ref 70–99)
GLUCOSE SERPL-MCNC: 130 MG/DL (ref 70–99)
HCO3 BLDV-SCNC: 23 MMOL/L (ref 21–28)
HCT VFR BLD AUTO: 37.1 % (ref 40–53)
HGB BLD-MCNC: 12.3 G/DL (ref 13.3–17.7)
MCH RBC QN AUTO: 30.6 PG (ref 26.5–33)
MCHC RBC AUTO-ENTMCNC: 33.2 G/DL (ref 31.5–36.5)
MCV RBC AUTO: 92 FL (ref 78–100)
O2/TOTAL GAS SETTING VFR VENT: 21 %
PCO2 BLDV: 38 MM HG (ref 40–50)
PH BLDV: 7.39 [PH] (ref 7.32–7.43)
PLATELET # BLD AUTO: 198 10E3/UL (ref 150–450)
PO2 BLDV: 37 MM HG (ref 25–47)
POTASSIUM SERPL-SCNC: 4.2 MMOL/L (ref 3.4–5.3)
RBC # BLD AUTO: 4.02 10E6/UL (ref 4.4–5.9)
SODIUM SERPL-SCNC: 138 MMOL/L (ref 135–145)
WBC # BLD AUTO: 11 10E3/UL (ref 4–11)

## 2023-11-07 PROCEDURE — 250N000011 HC RX IP 250 OP 636: Mod: JZ | Performed by: INTERNAL MEDICINE

## 2023-11-07 PROCEDURE — 99232 SBSQ HOSP IP/OBS MODERATE 35: CPT | Performed by: FAMILY MEDICINE

## 2023-11-07 PROCEDURE — 120N000001 HC R&B MED SURG/OB

## 2023-11-07 PROCEDURE — 258N000003 HC RX IP 258 OP 636: Performed by: FAMILY MEDICINE

## 2023-11-07 PROCEDURE — 82803 BLOOD GASES ANY COMBINATION: CPT | Performed by: FAMILY MEDICINE

## 2023-11-07 PROCEDURE — 97110 THERAPEUTIC EXERCISES: CPT | Mod: GP | Performed by: PHYSICAL THERAPIST

## 2023-11-07 PROCEDURE — 250N000011 HC RX IP 250 OP 636: Performed by: FAMILY MEDICINE

## 2023-11-07 PROCEDURE — 97530 THERAPEUTIC ACTIVITIES: CPT | Mod: GP | Performed by: PHYSICAL THERAPIST

## 2023-11-07 PROCEDURE — 80048 BASIC METABOLIC PNL TOTAL CA: CPT | Performed by: FAMILY MEDICINE

## 2023-11-07 PROCEDURE — 250N000013 HC RX MED GY IP 250 OP 250 PS 637: Performed by: INTERNAL MEDICINE

## 2023-11-07 PROCEDURE — 82010 KETONE BODYS QUAN: CPT | Performed by: FAMILY MEDICINE

## 2023-11-07 PROCEDURE — 36415 COLL VENOUS BLD VENIPUNCTURE: CPT | Performed by: FAMILY MEDICINE

## 2023-11-07 PROCEDURE — 85027 COMPLETE CBC AUTOMATED: CPT | Performed by: FAMILY MEDICINE

## 2023-11-07 RX ORDER — DEXTROSE MONOHYDRATE, SODIUM CHLORIDE, AND POTASSIUM CHLORIDE 50; 1.49; 4.5 G/1000ML; G/1000ML; G/1000ML
INJECTION, SOLUTION INTRAVENOUS CONTINUOUS
Status: DISCONTINUED | OUTPATIENT
Start: 2023-11-07 | End: 2023-11-08

## 2023-11-07 RX ADMIN — CEFTRIAXONE SODIUM 2 G: 2 INJECTION, POWDER, FOR SOLUTION INTRAMUSCULAR; INTRAVENOUS at 20:35

## 2023-11-07 RX ADMIN — AZITHROMYCIN MONOHYDRATE 500 MG: 500 INJECTION, POWDER, LYOPHILIZED, FOR SOLUTION INTRAVENOUS at 21:33

## 2023-11-07 RX ADMIN — POTASSIUM CHLORIDE, DEXTROSE MONOHYDRATE AND SODIUM CHLORIDE: 150; 5; 450 INJECTION, SOLUTION INTRAVENOUS at 23:41

## 2023-11-07 RX ADMIN — POTASSIUM CHLORIDE, DEXTROSE MONOHYDRATE AND SODIUM CHLORIDE: 150; 5; 450 INJECTION, SOLUTION INTRAVENOUS at 09:24

## 2023-11-07 RX ADMIN — ASPIRIN 81 MG: 81 TABLET ORAL at 09:21

## 2023-11-07 RX ADMIN — ROSUVASTATIN CALCIUM 40 MG: 20 TABLET, FILM COATED ORAL at 09:21

## 2023-11-07 RX ADMIN — PANTOPRAZOLE SODIUM 40 MG: 40 TABLET, DELAYED RELEASE ORAL at 09:21

## 2023-11-07 RX ADMIN — INSULIN ASPART 2 UNITS: 100 INJECTION, SOLUTION INTRAVENOUS; SUBCUTANEOUS at 12:05

## 2023-11-07 RX ADMIN — ACETAMINOPHEN 650 MG: 325 TABLET, FILM COATED ORAL at 23:37

## 2023-11-07 RX ADMIN — ENOXAPARIN SODIUM 40 MG: 40 INJECTION SUBCUTANEOUS at 09:20

## 2023-11-07 RX ADMIN — METOPROLOL TARTRATE 50 MG: 50 TABLET, FILM COATED ORAL at 09:21

## 2023-11-07 RX ADMIN — INSULIN ASPART 1 UNITS: 100 INJECTION, SOLUTION INTRAVENOUS; SUBCUTANEOUS at 09:18

## 2023-11-07 RX ADMIN — INSULIN ASPART 4 UNITS: 100 INJECTION, SOLUTION INTRAVENOUS; SUBCUTANEOUS at 18:03

## 2023-11-07 ASSESSMENT — ACTIVITIES OF DAILY LIVING (ADL)
ADLS_ACUITY_SCORE: 21
ADLS_ACUITY_SCORE: 19
ADLS_ACUITY_SCORE: 21
ADLS_ACUITY_SCORE: 21

## 2023-11-07 NOTE — PLAN OF CARE
Goal Outcome Evaluation:      Plan of Care Reviewed With: patient          Outcome Evaluation: Vss. RA 95%. Lungs are clear. Denies pain but at times abrasions on L arm and L leg are tender. Pt has generalized weakness.  overnight. Telemetry is NSR.

## 2023-11-07 NOTE — PLAN OF CARE
Goal Outcome Evaluation:      Plan of Care Reviewed With: patient    Overall Patient Progress: no changeOverall Patient Progress: no change    Outcome Evaluation: AxOx4.  VSS.  Lungs clear, diminised to LLL. RA w SpO2 >90%. Tele NSR.  Ambulated em with assist 1 gait belt and walker. tolerated well.  Sputum collected. Foam dressing in place to abrasians. Ok for caffeine.    BP (!) 141/72 (BP Location: Left arm)   Pulse 71   Temp 98.6  F (37  C) (Oral)   Resp 18   Ht 1.829 m (6')   Wt 86.5 kg (190 lb 12.8 oz)   SpO2 97%   BMI 25.88 kg/m

## 2023-11-07 NOTE — PROGRESS NOTES
[facility]    Medicine Progress Note - Hospitalist Service    Date of Admission:  11/5/2023    Assessment & Plan   Patient is a 73-year-old gentleman with past medical history of noninsulin-dependent type 2 diabetes, hyperlipidemia, extensive coronary artery disease status post numerous stents who presented with 1 week history of worsening respiratory status and onset of profound weakness and was found to have sepsis related to community-acquired pneumonia and has been subsequently hospitalized for ongoing management.    Principal Problem:    Sepsis without acute organ dysfunction, due to unspecified organism (H)    Community acquired bacterial pneumonia    Assessment: Patient has been on room air for the past 24 hours with ongoing improvement of strength and overall improvement in energy levels after oral intake.  Leukocytosis resolving, lactic acid has normalized, procalcitonin trending down and patient feeling clinically improved overall    Plan:   -Continue Rocephin and azithromycin for antibiotics  -Monitor for ongoing resolution of sepsis symptoms  -Supplement with O2 as needed, however anticipated no further supplementation will be necessary given his ongoing clinical improvement.  -Anticipate discharge home in the next 1 to 2 days as patient can maintain adequate hydration and nutrition status with oral intake alone with ongoing improvement in energy and strength    Active Problems:    High anion gap metabolic acidosis - suspected    Ketosis (H)    Elevated lactic acid level    Assessment: On initial presentation patient had an elevated lactic acid of 20 with bicarb of 17.  Lactic acid at that time was 2.4 however no VBG or ketones were performed to confirm metabolic acidosis or evaluate for ketosis component.  The following morning patient had normalization of the lactic acid but an ongoing elevated anion gap with ongoing low bicarb and ketones were found to be notably  elevated at 1.80 but with blood sugars in the 80s to 170s and no signs of DKA or hyperosmolar coma.  Ketosis secondary to minimal oral intake in combination with initial lactic acidosis suspected to be the etiology for elevated anion gap.  Patient's oral intake has slightly improved and ketones are trending downward but have not yet normalized.  Anion gap has now normalized this morning.    Plan:   -Initiate D5 half-normal with potassium chloride fluids at low rate today in an effort to help with ongoing resolution of ketosis until patient's oral intake increases  -Monitor for ongoing resolution of elevated anion gap      Type 2 diabetes mellitus with circulatory disorder, without long-term current use of insulin (H)    Assessment: Patient is normally on metformin 1000 mg twice daily, Jardiance 25 mg daily, and glipizide 10 mg twice daily.  All of these were held on initial presentation given very poor appetite with blood sugars remaining in the low to mid 100 range    Plan:   -Continue to hold home regimen of metformin, Jardiance, and glipizide however with initiation of dextrose containing IV fluids as outlined above for ongoing ketosis may have elevation of blood sugars and consideration of reinitiation of some of his home regimen tomorrow if this does occur.      Coronary artery disease involving native heart without angina pectoris, unspecified vessel or lesion type -  Stents 1992,1997, 1998, 1999, 2008, 2011    Assessment: Noted history however patient has no recent symptoms concerning for angina.  Is normally on metoprolol 50 mg twice daily, Crestor 40 mg daily    Plan:   -Continue with home regimen without change      Chronic kidney disease, stage 3a (H)    Assessment: Previously documented history however review of recent creatinines have shown more stage II disease and creatinine has been stable to slightly better than the baseline following initial fluid resuscitation    Plan:   -To monitor for  ongoing      Essential hypertension    Assessment: Patient normally on metoprolol 50 mg twice daily which has been continued during this stay given no signs of septic shock and blood pressures continue to be overall in the normal to mildly hypertensive range    Plan:   -Continue home metoprolol regimen and monitor for ongoing blood pressure stability.      Hyperlipidemia LDL goal <70    Assessment: Patient on Crestor 40 mg daily    Plan:    -Continue home regimen without change          Diet: Combination Diet Moderate Consistent Carb (60 g CHO per Meal) Diet; Low Saturated Fat Na <2400mg Diet    DVT Prophylaxis: Enoxaparin (Lovenox) SQ  Reis Catheter: Not present  Lines: None     Cardiac Monitoring: ACTIVE order. Indication: Tachyarrhythmias, acute (48 hours)  Code Status: Full Code      Clinically Significant Risk Factors          # Hypocalcemia: Lowest Ca = 8.4 mg/dL in last 2 days, will monitor and replace as appropriate    # Anion Gap Metabolic Acidosis: Highest Anion Gap = 20 mmol/L in last 2 days, will monitor and treat as appropriate      # Hypertension: Noted on problem list       # DMII: A1C = 9.4 % (Ref range: 0.0 - 5.6 %) within past 6 months, PRESENT ON ADMISSION  # Overweight: Estimated body mass index is 25.46 kg/m  as calculated from the following:    Height as of this encounter: 1.829 m (6').    Weight as of this encounter: 85.1 kg (187 lb 11.2 oz)., PRESENT ON ADMISSION            Disposition Plan   Anticipate discharge home in 1 to 2 days         Katie Gabriel MD  Hospitalist Service  [facility]  Securely message with [employer] (more info)  Text page via [employer] Paging/Directory   ______________________________________________________________________    Interval History   Patient continues to improve.  He is amazed by the increase in his balance and strength over the past 24 hours and is very pleased.  He reports his breathing has also significantly improved  and his cough still present and still coughing up thick phlegm but overall breathing is much easier.  Appetite continues to be poor but has been improving.  Patient denies any new symptoms.  No new nursing concerns.    Physical Exam   Vital Signs: Temp: 98.2  F (36.8  C) Temp src: Oral BP: 133/68 Pulse: 61   Resp: 18 SpO2: 97 % O2 Device: None (Room air)    Weight: 187 lbs 11.2 oz    Constitutional: awake, alert, cooperative, no apparent distress, and appears stated age  Respiratory: No increased work of breathing, good air exchange, clear to auscultation bilaterally, no crackles or wheezing  Cardiovascular: Regular rate and rhythm  GI: Bowel sounds present, abdomen soft and nondistended  Musculoskeletal: no lower extremity pitting edema present  Neurologic: Awake, alert, oriented to name, place and situation.    Medical Decision Making     40 MINUTES SPENT BY ME on the date of service doing chart review, history, exam, documentation & further activities per the note.      Data     I have personally reviewed the following data over the past 24 hrs:    11.0  \   12.3 (L)   / 198     138 105 15.5 /  225 (H)   4.2 20 (L) 0.86 \

## 2023-11-07 NOTE — PLAN OF CARE
Physical Therapy Discharge Summary    Reason for therapy discharge:    All goals and outcomes met, no further needs identified.    Progress towards therapy goal(s). See goals on Care Plan in Epic electronic health record for goal details.  Goals met    Therapy recommendation(s):    No further therapy is recommended.      Thank you for your referral.  Jennifer Rothman, PT, DPT, ATC, [facility]  O: [phone]  E: [email or web address]

## 2023-11-08 VITALS
WEIGHT: 187.4 LBS | HEIGHT: 72 IN | SYSTOLIC BLOOD PRESSURE: 143 MMHG | HEART RATE: 84 BPM | DIASTOLIC BLOOD PRESSURE: 86 MMHG | TEMPERATURE: 98.7 F | RESPIRATION RATE: 16 BRPM | OXYGEN SATURATION: 94 % | BODY MASS INDEX: 25.38 KG/M2

## 2023-11-08 PROBLEM — E87.20 LACTIC ACIDOSIS: Status: ACTIVE | Noted: 2023-11-07

## 2023-11-08 PROBLEM — R65.20 SEPSIS WITH ACUTE ORGAN DYSFUNCTION (H): Status: ACTIVE | Noted: 2023-11-05

## 2023-11-08 LAB
ANION GAP SERPL CALCULATED.3IONS-SCNC: 13 MMOL/L (ref 7–15)
ANION GAP SERPL CALCULATED.3IONS-SCNC: 13 MMOL/L (ref 7–15)
B-OH-BUTYR SERPL-SCNC: 0.4 MMOL/L
B-OH-BUTYR SERPL-SCNC: 0.7 MMOL/L
BASE EXCESS BLDV CALC-SCNC: 0.3 MMOL/L (ref -7.7–1.9)
BUN SERPL-MCNC: 13.4 MG/DL (ref 8–23)
CALCIUM SERPL-MCNC: 8.8 MG/DL (ref 8.8–10.2)
CHLORIDE SERPL-SCNC: 100 MMOL/L (ref 98–107)
CHLORIDE SERPL-SCNC: 103 MMOL/L (ref 98–107)
CREAT SERPL-MCNC: 0.97 MG/DL (ref 0.67–1.17)
DEPRECATED HCO3 PLAS-SCNC: 20 MMOL/L (ref 22–29)
DEPRECATED HCO3 PLAS-SCNC: 21 MMOL/L (ref 22–29)
EGFRCR SERPLBLD CKD-EPI 2021: 81 ML/MIN/1.73M2
ERYTHROCYTE [DISTWIDTH] IN BLOOD BY AUTOMATED COUNT: 12.3 % (ref 10–15)
GLUCOSE BLDC GLUCOMTR-MCNC: 168 MG/DL (ref 70–99)
GLUCOSE BLDC GLUCOMTR-MCNC: 195 MG/DL (ref 70–99)
GLUCOSE BLDC GLUCOMTR-MCNC: 203 MG/DL (ref 70–99)
GLUCOSE SERPL-MCNC: 195 MG/DL (ref 70–99)
HCO3 BLDV-SCNC: 26 MMOL/L (ref 21–28)
HCT VFR BLD AUTO: 36.5 % (ref 40–53)
HGB BLD-MCNC: 12.3 G/DL (ref 13.3–17.7)
MCH RBC QN AUTO: 30.8 PG (ref 26.5–33)
MCHC RBC AUTO-ENTMCNC: 33.7 G/DL (ref 31.5–36.5)
MCV RBC AUTO: 92 FL (ref 78–100)
O2/TOTAL GAS SETTING VFR VENT: 21 %
PCO2 BLDV: 43 MM HG (ref 40–50)
PH BLDV: 7.38 [PH] (ref 7.32–7.43)
PLATELET # BLD AUTO: 210 10E3/UL (ref 150–450)
PO2 BLDV: 20 MM HG (ref 25–47)
POTASSIUM SERPL-SCNC: 4.4 MMOL/L (ref 3.4–5.3)
POTASSIUM SERPL-SCNC: 4.5 MMOL/L (ref 3.4–5.3)
RBC # BLD AUTO: 3.99 10E6/UL (ref 4.4–5.9)
SODIUM SERPL-SCNC: 134 MMOL/L (ref 135–145)
SODIUM SERPL-SCNC: 136 MMOL/L (ref 135–145)
WBC # BLD AUTO: 8.3 10E3/UL (ref 4–11)

## 2023-11-08 PROCEDURE — 85027 COMPLETE CBC AUTOMATED: CPT | Performed by: FAMILY MEDICINE

## 2023-11-08 PROCEDURE — 80051 ELECTROLYTE PANEL: CPT | Performed by: PEDIATRICS

## 2023-11-08 PROCEDURE — 82010 KETONE BODYS QUAN: CPT | Performed by: FAMILY MEDICINE

## 2023-11-08 PROCEDURE — 82010 KETONE BODYS QUAN: CPT | Performed by: PEDIATRICS

## 2023-11-08 PROCEDURE — 82803 BLOOD GASES ANY COMBINATION: CPT | Performed by: PEDIATRICS

## 2023-11-08 PROCEDURE — 36415 COLL VENOUS BLD VENIPUNCTURE: CPT | Performed by: PEDIATRICS

## 2023-11-08 PROCEDURE — 36415 COLL VENOUS BLD VENIPUNCTURE: CPT | Performed by: FAMILY MEDICINE

## 2023-11-08 PROCEDURE — 250N000011 HC RX IP 250 OP 636: Mod: JZ | Performed by: INTERNAL MEDICINE

## 2023-11-08 PROCEDURE — 80048 BASIC METABOLIC PNL TOTAL CA: CPT | Performed by: FAMILY MEDICINE

## 2023-11-08 PROCEDURE — 250N000013 HC RX MED GY IP 250 OP 250 PS 637: Performed by: PEDIATRICS

## 2023-11-08 PROCEDURE — 99239 HOSP IP/OBS DSCHRG MGMT >30: CPT | Performed by: PEDIATRICS

## 2023-11-08 PROCEDURE — 250N000013 HC RX MED GY IP 250 OP 250 PS 637: Performed by: INTERNAL MEDICINE

## 2023-11-08 RX ORDER — CEFDINIR 300 MG/1
600 CAPSULE ORAL EVERY EVENING
Status: DISCONTINUED | OUTPATIENT
Start: 2023-11-08 | End: 2023-11-08 | Stop reason: HOSPADM

## 2023-11-08 RX ORDER — CEFDINIR 300 MG/1
600 CAPSULE ORAL EVERY EVENING
Qty: 14 CAPSULE | Refills: 0 | Status: SHIPPED | OUTPATIENT
Start: 2023-11-08 | End: 2023-11-28

## 2023-11-08 RX ORDER — GLIPIZIDE 5 MG/1
10 TABLET ORAL
Status: DISCONTINUED | OUTPATIENT
Start: 2023-11-08 | End: 2023-11-08 | Stop reason: HOSPADM

## 2023-11-08 RX ORDER — ASPIRIN 81 MG/1
81 TABLET ORAL DAILY
COMMUNITY
Start: 2023-11-08 | End: 2023-11-28

## 2023-11-08 RX ADMIN — PANTOPRAZOLE SODIUM 40 MG: 40 TABLET, DELAYED RELEASE ORAL at 08:54

## 2023-11-08 RX ADMIN — ENOXAPARIN SODIUM 40 MG: 40 INJECTION SUBCUTANEOUS at 08:01

## 2023-11-08 RX ADMIN — ASPIRIN 81 MG: 81 TABLET ORAL at 08:54

## 2023-11-08 RX ADMIN — METOPROLOL TARTRATE 50 MG: 50 TABLET, FILM COATED ORAL at 08:54

## 2023-11-08 RX ADMIN — INSULIN ASPART 2 UNITS: 100 INJECTION, SOLUTION INTRAVENOUS; SUBCUTANEOUS at 08:01

## 2023-11-08 RX ADMIN — GLIPIZIDE 10 MG: 5 TABLET ORAL at 08:54

## 2023-11-08 RX ADMIN — ROSUVASTATIN CALCIUM 40 MG: 20 TABLET, FILM COATED ORAL at 08:54

## 2023-11-08 ASSESSMENT — ACTIVITIES OF DAILY LIVING (ADL)
ADLS_ACUITY_SCORE: 19

## 2023-11-08 NOTE — DISCHARGE SUMMARY
[facility]  Hospitalist Discharge Summary      Date of Admission:  11/5/2023  Date of Discharge:  11/8/2023  Discharging Provider: Venu Plunkett MD  Discharge Service: Hospitalist Service    Discharge Diagnoses   Principal Problem:    Community acquired bacterial pneumonia  Active Problems:    Sepsis with acute organ dysfunction (H)    Coronary artery disease: Stents 1992,1997, 1998, 1999, 2008, 2011    Essential hypertension    Type 2 diabetes mellitus with circulatory disorder, without long-term current use of insulin (H)    Chronic kidney disease, stage 3a (H)    High anion gap metabolic acidosis    Lactic acidosis    Ketosis (H)    Hyperlipidemia LDL goal <70    Clinically Significant Risk Factors     # DMII: A1C = 9.4 % (Ref range: 0.0 - 5.6 %) within past 6 months  # Overweight: Estimated body mass index is 25.42 kg/m  as calculated from the following:    Height as of this encounter: 1.829 m (6').    Weight as of this encounter: 85 kg (187 lb 6.4 oz).       Follow-ups Needed After Discharge   Follow-up Appointments     Follow-up and recommended labs and tests       Follow up with primary care provider, Rigo Rivas, within 7 days to   evaluate medication change and for hospital follow- up.            Unresulted Labs Ordered in the Past 30 Days of this Admission       Date and Time Order Name Status Description    11/6/2023  1:54 AM Respiratory Aerobic Bacterial Culture Preliminary     11/5/2023 11:22 PM Blood Culture Arm, Right Preliminary     11/5/2023 11:22 PM Blood Culture Peripheral Blood Preliminary         These results will be followed up by PCP    Discharge Disposition   Discharged to home  Condition at discharge: Stable    Hospital Course   73-year-old man with noninsulin-dependent type 2 diabetes, hyperlipidemia, and extensive coronary artery disease status post numerous stents who presented with 1 week history of worsening respiratory status and profound  weakness and was admitted due to concerns for sepsis and community-acquired pneumonia.    Community acquired bacterial pneumonia  Sepsis with acute organ dysfunction, due to unspecified organism  Presented with symptoms typical for pneumonia and found to have radiographic infiltrates.  Testing for COVID, influenza, and RSV was negative.  Sputum culture was negative.  Blood cultures were negative.  He presented with SIRS criteria and lactic acidosis concerning for sepsis with acute organ dysfunction due to pneumonia.  He was treated with antibiotics and IV fluids and improved.  Signs of sepsis resolved.  At discharge he was advised to complete antibiotic treatment course with cefdinir.      High anion gap metabolic acidosis likely due to lactic acidosis and ketoacidosis  On presentation patient had low serum carbon dioxide with elevated anion gap and elevated lactic acid.  He subsequently was found to have ketonemia as well.  He was treated with antibiotics and IV fluids.  Chronic metformin and Jardiance were held.  As diet was advanced he maintained adequate glycemic control and diabetic ketoacidosis was not suspected.  After investigation, lactic acidosis due to sepsis and ketoacidosis possibly due to Jardiance as well as poor oral intake was suspected.  Because of his lactic acidosis and ketoacidosis, he was advised to avoid restarting metformin and Jardiance until after outpatient follow-up with his PCP.    Type 2 diabetes mellitus with circulatory disorder, without long-term current use of insulin  Chronic type 2 diabetes treated with metformin 1000 mg twice daily, Jardiance 25 mg daily, and glipizide 10 mg twice daily.  Oral diabetic medications were held initially and he was treated with NovoLog correction scale.  As he improved and tolerate advancing diet, glipizide was restarted.    Coronary artery disease involving native heart without angina pectoris, unspecified vessel or lesion type -  Stents 1992,1997,  1998, 1999, 2008, 2011  Noted history of CAD with multiple coronary artery stents but no recent symptoms concerning for angina.  CAD is chronically treated with aspirin, metoprolol 50 mg twice daily, and Crestor 40 mg daily which were continued.    Chronic kidney disease, stage 3a  Previously documented history of stage IIIa CKD although review of recent creatinine levels are more suspicious for stage II CKD and creatinine during hospitalization was stable to slightly better than previous baseline.    Essential hypertension  Chronic hypertension treated with metoprolol 50 mg twice daily which was continued during this hospitalization.    Hyperlipidemia LDL goal <70  Chronic hyperlipidemia normally treated with Crestor 40 mg daily which was continued.    Consultations This Hospital Stay   PHYSICAL THERAPY ADULT IP CONSULT  PHYSICAL THERAPY ADULT IP CONSULT    Code Status   Full Code    Time Spent on this Encounter   I, Venu Plunkett MD, personally saw the patient today and spent greater than 30 minutes discharging this patient.       Venu Plunkett MD  [facility] MEDICAL SURGICAL  [address] [place] [address]  Phone: [phone]  ______________________________________________________________________    Physical Exam   Vital Signs: Temp: 98.7  F (37.1  C) Temp src: Oral BP: (!) 143/86 Pulse: 84   Resp: 16 SpO2: 94 % O2 Device: None (Room air)    Weight: 187 lbs 6.4 oz  General Appearance: Appears comfortable sitting up in a chair  Respiratory: Normal respiratory effort, clear lung fields  Cardiovascular: Regular rate and rhythm, good radial pulse, normal capillary refill        Primary Care Physician   Rigo Rivas    Discharge Orders      Reason for your hospital stay    Hospitalized due to pneumonia and improved     Follow-up and recommended labs and tests     Follow up with primary care provider, Rigo Rivas, within 7 days to evaluate medication change and for  hospital follow- up.     Activity    Your activity upon discharge: activity as tolerated     Monitor and record    blood glucose per your usual home routine     Diet    Follow this diet upon discharge: Orders Placed This Encounter      Combination Diet Moderate Consistent Carb (60 g CHO per Meal) Diet       Significant Results and Procedures   Most Recent 3 CBC's:  Recent Labs   Lab Test 11/08/23  0540 11/07/23  0539 11/06/23  0543   WBC 8.3 11.0 16.9*   HGB 12.3* 12.3* 12.0*   MCV 92 92 93    198 194     Most Recent 3 BMP's:  Recent Labs   Lab Test 11/08/23  1154 11/08/23  0755 11/08/23  0540 11/07/23  0812 11/07/23  0539 11/06/23 2047 11/06/23  1755   NA  --   --  136  --  138  --  137   POTASSIUM  --   --  4.4  --  4.2  --  4.1   CHLORIDE  --   --  103  --  105  --  103   CO2  --   --  20*  --  20*  --  21*   BUN  --   --  13.4  --  15.5  --  20.9   CR  --   --  0.97  --  0.86  --  0.98   ANIONGAP  --   --  13  --  13  --  13   TAYLOR  --   --  8.8  --  9.0  --  8.9   * 195* 195*   < > 130*   < > 184*    < > = values in this interval not displayed.     Most Recent 2 LFT's:  Recent Labs   Lab Test 11/05/23  2248 12/23/22  1607   AST 21 13   ALT 19 30   ALKPHOS 73 70   BILITOTAL 1.6* 1.2     7-Day Micro Results       Collected Updated Procedure Result Status      11/06/2023 1655 11/08/2023 1239 Respiratory Aerobic Bacterial Culture [[id number]]   Sputum from Expectorate    Preliminary result Component Value   Culture 2+ Normal sher to date  [P]    Gram Stain Result <10 Squamous epithelial cells/low power field  [P]     <25 PMNs/low power field  [P]     2+ Mixed sher  [P]                11/05/2023 2345 11/08/2023 0401 Blood Culture Arm, Right [[id number]]   Blood from Arm, Right    Preliminary result Component Value   Culture No growth after 2 days  [P]                11/05/2023 2338 11/08/2023 0401 Blood Culture Peripheral Blood [[id number]]   Peripheral Blood    Preliminary result Component  Value   Culture No growth after 2 days  [P]                11/05/2023 2249 11/05/2023 2335 Symptomatic Influenza A/B, RSV, & SARS-CoV2 PCR (COVID-19) Nose [[id number]]    Swab from Nose    Final result Component Value   Influenza A PCR Negative   Influenza B PCR Negative   RSV PCR Negative   SARS CoV2 PCR Negative   NEGATIVE: SARS-CoV-2 (COVID-19) RNA not detected, presumed negative.                  Most Recent Hemoglobin A1c:  Recent Labs   Lab Test 08/22/23  1108   A1C 9.4*     Most Recent 6 glucoses:  Recent Labs   Lab Test 11/08/23  1154 11/08/23  0755 11/08/23  0540 11/08/23  0225 11/07/23  2049 11/07/23  1642   * 195* 195* 203* 225* 299*   ,   Results for orders placed or performed during the hospital encounter of 11/05/23   XR Chest Port 1 View    Narrative    EXAM: XR CHEST PORT 1 VIEW  LOCATION: [facility]  DATE: 11/5/2023    INDICATION: cough, weakness  COMPARISON: 07/18/2019.      Impression    IMPRESSION: Heart size within normal limits. Status post sternotomy. Streaky atelectasis or infiltrate in the left infrahilar region. Right lung is clear. No pneumothorax or pleural effusion.       Discharge Medications   Current Discharge Medication List        START taking these medications    Details   cefdinir (OMNICEF) 300 MG capsule Take 2 capsules (600 mg) by mouth every evening for 7 days  Qty: 14 capsule, Refills: 0    Associated Diagnoses: Community acquired bacterial pneumonia           CONTINUE these medications which have CHANGED    Details   aspirin 81 MG EC tablet Take 1 tablet (81 mg) by mouth daily    Associated Diagnoses: Peripheral vascular disease, unspecified (H24)      empagliflozin (JARDIANCE) 25 MG TABS tablet Do not restart until after recheck with PCP    Associated Diagnoses: Type 2 diabetes mellitus with other circulatory complication, without long-term current use of insulin (H); Coronary artery disease involving native coronary artery of native  heart without angina pectoris      metFORMIN (GLUCOPHAGE) 500 MG tablet Do not restart until after recheck with PCP    Associated Diagnoses: Type 2 diabetes mellitus with other circulatory complication, without long-term current use of insulin (H)      omeprazole (PRILOSEC) 20 MG DR capsule Take 1 capsule (20 mg) by mouth daily as needed (heartburn) (take 30 minutes prior to a meal)    Associated Diagnoses: Gastroesophageal reflux disease without esophagitis           CONTINUE these medications which have NOT CHANGED    Details   blood glucose (NO BRAND SPECIFIED) test strip Use to test blood sugar 2 times daily or as directed.  ACCU-CHEK JUAN  Qty: 100 strip, Refills: 11    Associated Diagnoses: Type 2 diabetes, HbA1c goal < 7% (H)      blood glucose monitoring (NO BRAND SPECIFIED) meter device kit Use to test blood sugar 2 times daily or as directed.  Qty: 1 kit, Refills: 0    Associated Diagnoses: Type 2 diabetes, HbA1c goal < 7% (H)      glipiZIDE (GLUCOTROL) 10 MG tablet Take 1 tablet (10 mg) by mouth 2 times daily (before meals)  Qty: 180 tablet, Refills: 0    Associated Diagnoses: Type 2 diabetes mellitus with other circulatory complication, without long-term current use of insulin (H)      metoprolol tartrate (LOPRESSOR) 50 MG tablet Take 1 tablet (50 mg) by mouth 2 times daily  Qty: 180 tablet, Refills: 3    Associated Diagnoses: HTN, goal below 140/90      rosuvastatin (CRESTOR) 40 MG tablet Take 1 tablet (40 mg) by mouth daily  Qty: 90 tablet, Refills: 3    Associated Diagnoses: Coronary artery disease involving native coronary artery of native heart without angina pectoris; Hyperlipidemia LDL goal <70           Allergies   Allergies   Allergen Reactions    Ace Inhibitors Cough    Contrast Dye Other (See Comments)     Patient felt like ants were crawling all over him/ per patient's explaination 12-15-16/tw

## 2023-11-08 NOTE — PLAN OF CARE
S-(situation): Patient discharged to home via w/c with family at 1405       B-(background): Community acquired pnemonia    A-(assessment): Patient is alert, oriented. Denies SOB, pain. Ambulates SBA in room. No 02 needs. Infrequent non productive cough.     R-(recommendations): Discharge instructions reviewed with patient and family. Listed belongings gathered and returned to patient.          Discharge Nursing Criteria:     Care Plan and Patient education resolved: Yes    New Medications- pt has been educated about purpose and side effects: Yes    Vaccines  Influenza status verified at discharge:  Yes      MISC  Prescriptions if needed, hard copies sent with patient  NA  Home medications returned to patient: NA  Medication Bin checked and emptied on discharge Yes  Patient reports post-discharge pain management plan is effective: Yes

## 2023-11-08 NOTE — PLAN OF CARE
Goal Outcome Evaluation:      Plan of Care Reviewed With: patient    Overall Patient Progress: improvingOverall Patient Progress: improving    Outcome Evaluation: Patient is alert, oriented. Up in the chair SBA. IV fluids changed this shift to D5 1/2 NS with 20K. -education provided on upward trending blood glucose. Uses urinal at bedside. States that L leg is tender but otherwise denies pain. Infrequent cough noted, dry.

## 2023-11-08 NOTE — PROGRESS NOTES
Blood sugar was 225 and covered.   Metoprolol held for intermittent bradycardia, Provider notified.   Infrequent cough with moderate amount of productive sputum.  Lungs are clear.   Will continue with plan of care.

## 2023-11-08 NOTE — PLAN OF CARE
Goal Outcome Evaluation:      Plan of Care Reviewed With: patient    Overall Patient Progress: improvingOverall Patient Progress: improving    Outcome Evaluation: Pt A&Ox4. D5 1/2NS +20KCL infusing at 75ml/hr. Tele SR. LS clear/diminished. Pt easily irritable and frustrated with nursing care, care explained and choices provided. 0200 .

## 2023-11-09 LAB
BACTERIA SPT CULT: NORMAL
GRAM STAIN RESULT: NORMAL

## 2023-11-11 LAB
BACTERIA BLD CULT: NO GROWTH
BACTERIA BLD CULT: NO GROWTH

## 2023-11-20 ENCOUNTER — OFFICE VISIT (OUTPATIENT)
Dept: FAMILY MEDICINE | Facility: OTHER | Age: 73
End: 2023-11-20
Payer: COMMERCIAL

## 2023-11-20 VITALS
DIASTOLIC BLOOD PRESSURE: 62 MMHG | RESPIRATION RATE: 14 BRPM | HEIGHT: 72 IN | HEART RATE: 69 BPM | WEIGHT: 191 LBS | TEMPERATURE: 98.5 F | BODY MASS INDEX: 25.87 KG/M2 | OXYGEN SATURATION: 96 % | SYSTOLIC BLOOD PRESSURE: 108 MMHG

## 2023-11-20 DIAGNOSIS — I25.10 CORONARY ARTERY DISEASE INVOLVING NATIVE CORONARY ARTERY OF NATIVE HEART WITHOUT ANGINA PECTORIS: ICD-10-CM

## 2023-11-20 DIAGNOSIS — I10 ESSENTIAL HYPERTENSION: ICD-10-CM

## 2023-11-20 DIAGNOSIS — N18.31 CHRONIC KIDNEY DISEASE, STAGE 3A (H): ICD-10-CM

## 2023-11-20 DIAGNOSIS — E11.29 MICROALBUMINURIA DUE TO TYPE 2 DIABETES MELLITUS (H): ICD-10-CM

## 2023-11-20 DIAGNOSIS — J15.9 COMMUNITY ACQUIRED BACTERIAL PNEUMONIA: ICD-10-CM

## 2023-11-20 DIAGNOSIS — Z09 HOSPITAL DISCHARGE FOLLOW-UP: Primary | ICD-10-CM

## 2023-11-20 DIAGNOSIS — R80.9 MICROALBUMINURIA DUE TO TYPE 2 DIABETES MELLITUS (H): ICD-10-CM

## 2023-11-20 PROBLEM — E88.89 KETOSIS (H): Status: RESOLVED | Noted: 2023-11-07 | Resolved: 2023-11-20

## 2023-11-20 LAB
ANION GAP SERPL CALCULATED.3IONS-SCNC: 14 MMOL/L (ref 7–15)
BUN SERPL-MCNC: 15.5 MG/DL (ref 8–23)
CALCIUM SERPL-MCNC: 9.7 MG/DL (ref 8.8–10.2)
CHLORIDE SERPL-SCNC: 100 MMOL/L (ref 98–107)
CREAT SERPL-MCNC: 1.03 MG/DL (ref 0.67–1.17)
DEPRECATED HCO3 PLAS-SCNC: 21 MMOL/L (ref 22–29)
EGFRCR SERPLBLD CKD-EPI 2021: 76 ML/MIN/1.73M2
GLUCOSE SERPL-MCNC: 263 MG/DL (ref 70–99)
POTASSIUM SERPL-SCNC: 4.7 MMOL/L (ref 3.4–5.3)
SODIUM SERPL-SCNC: 135 MMOL/L (ref 135–145)

## 2023-11-20 PROCEDURE — 80048 BASIC METABOLIC PNL TOTAL CA: CPT | Performed by: STUDENT IN AN ORGANIZED HEALTH CARE EDUCATION/TRAINING PROGRAM

## 2023-11-20 PROCEDURE — 36415 COLL VENOUS BLD VENIPUNCTURE: CPT | Performed by: STUDENT IN AN ORGANIZED HEALTH CARE EDUCATION/TRAINING PROGRAM

## 2023-11-20 PROCEDURE — 99214 OFFICE O/P EST MOD 30 MIN: CPT | Performed by: STUDENT IN AN ORGANIZED HEALTH CARE EDUCATION/TRAINING PROGRAM

## 2023-11-20 RX ORDER — RESPIRATORY SYNCYTIAL VIRUS VACCINE 120MCG/0.5
0.5 KIT INTRAMUSCULAR ONCE
Qty: 1 EACH | Refills: 0 | Status: CANCELLED | OUTPATIENT
Start: 2023-11-20 | End: 2023-11-20

## 2023-11-20 RX ORDER — ALBUTEROL SULFATE 90 UG/1
2 AEROSOL, METERED RESPIRATORY (INHALATION) EVERY 6 HOURS PRN
Qty: 18 G | Refills: 1 | Status: CANCELLED | OUTPATIENT
Start: 2023-11-20

## 2023-11-20 ASSESSMENT — PAIN SCALES - GENERAL: PAINLEVEL: NO PAIN (0)

## 2023-11-20 NOTE — PROGRESS NOTES
Assessment & Plan     Hospital discharge follow-up  Community acquired bacterial pneumonia  Patient overall is feeling better, did complete his course of antibiotics.  Denies feeling currently short of breath.  Does have a slight/subtle wheeze in the upper right lobe on auscultation today.  We did discuss about potential albuterol inhaler versus pulmonary rehab for consideration.  Patient feels comfortable doing watchful waiting now.  Though, he does complain of a subtle cough and some mucus production.  I did encourage him to consider over-the-counter remedies including Robitussin, Mucinex, cough drops, etc.  He will give these a try.    Microalbuminuria due to type 2 diabetes mellitus (H)  Does have upcoming appointment with PCP for potential discussions about insulin, I did encourage him to resume his oral diabetic medications including Jardiance, glipizide, and metformin.    Chronic kidney disease, stage 3a (H)  - Basic metabolic panel  (Ca, Cl, CO2, Creat, Gluc, K, Na, BUN)    Coronary artery disease involving native coronary artery of native heart without angina pectoris  Previous cardiac bypass x5    Essential hypertension  Stable today      FAUSTINO RICO MD  [facility] BRIT Lam is a 73 year old, presenting for the following health issues:  Hospital F/U        11/20/2023     1:39 PM   Additional Questions   Roomed by Radha   Accompanied by Self         11/20/2023     1:39 PM   Patient Reported Additional Medications   Patient reports taking the following new medications none       HPI         Hospital Follow-up Visit:    Hospital/Nursing Home/IP Rehab Facility: [facility]  Date of Admission: 11/05/2023  Date of Discharge: 11/08/2023  Reason(s) for Admission: Community acquired bacterial pneumonia    Was your hospitalization related to COVID-19? No   Problems taking medications regularly:  None  Medication changes since discharge:  None  Problems adhering to non-medication therapy:  None    Summary of hospitalization:  [facility] discharge summary reviewed  Diagnostic Tests/Treatments reviewed.  Follow up needed: none  Other Healthcare Providers Involved in Patient s Care:         None  Update since discharge: improved.         Plan of care communicated with patient               Review of Systems   Constitutional, HEENT, cardiovascular, pulmonary, gi and gu systems are negative, except as otherwise noted.      Objective    /62   Pulse 69   Temp 98.5  F (36.9  C) (Temporal)   Resp 14   Ht 1.829 m (6')   Wt 86.6 kg (191 lb)   SpO2 96%   BMI 25.90 kg/m    Body mass index is 25.9 kg/m .  Physical Exam  Vitals and nursing note reviewed.   Constitutional:       General: He is not in acute distress.     Appearance: Normal appearance. He is not ill-appearing, toxic-appearing or diaphoretic.   HENT:      Head: Normocephalic and atraumatic.      Right Ear: Tympanic membrane, ear canal and external ear normal. There is no impacted cerumen.      Left Ear: Tympanic membrane, ear canal and external ear normal. There is no impacted cerumen.      Nose: Nose normal. No congestion or rhinorrhea.      Mouth/Throat:      Mouth: Mucous membranes are moist.      Pharynx: Oropharynx is clear. No oropharyngeal exudate or posterior oropharyngeal erythema.   Eyes:      General: No scleral icterus.        Right eye: No discharge.         Left eye: No discharge.      Extraocular Movements: Extraocular movements intact.      Conjunctiva/sclera: Conjunctivae normal.      Pupils: Pupils are equal, round, and reactive to light.   Cardiovascular:      Rate and Rhythm: Normal rate and regular rhythm.      Heart sounds: No murmur heard.  Pulmonary:      Effort: Pulmonary effort is normal. No respiratory distress.      Breath sounds: No stridor. Wheezing (Slight right upper lobe) present.   Abdominal:      General: There is no distension.       Palpations: Abdomen is soft.      Tenderness: There is no abdominal tenderness.      Hernia: No hernia is present.   Musculoskeletal:         General: Normal range of motion.      Cervical back: Normal range of motion.      Right lower leg: No edema.      Left lower leg: No edema.   Lymphadenopathy:      Cervical: No cervical adenopathy.   Skin:     General: Skin is warm.   Neurological:      Mental Status: He is alert.   Psychiatric:         Mood and Affect: Mood normal.         Behavior: Behavior normal.         Thought Content: Thought content normal.         Judgment: Judgment normal.

## 2023-11-20 NOTE — PATIENT INSTRUCTIONS
Below are the best things to potentially help your cough:    -throat lozenges, hydration with water, hot tea, honey, and/or smoking cessation or avoidance of secondhand smoke  -dextromethorphan (such as Robitussin) or guaifenesin (such as Mucinex)  -antihistamines (over the counter examples include Zyrtec/Cetirizine, Allegra/fexofenadine, Claritin/Loratadine)  -nasal sprays (examples include Flonase/fluticasone or Nasacort/triamcinolone)  -Humidified air (running a room humidifier or even running a hot shower in the bathroom)  -Prescription inhalers    If you feel that things are worsening such as feeling short of breath or chest pain, please call or be evaluated at a health care facility as soon as you can.     Reviewed: 2023

## 2023-11-21 NOTE — RESULT ENCOUNTER NOTE
Genaro,    Here are your recent results    Elevated glucose related to your diabetes.    All other labs/results within normal limits or stable.      If you have any questions feel free to call the clinic at [phone].      Thank you,    Juancarlos Farfan MD

## 2023-11-28 ENCOUNTER — OFFICE VISIT (OUTPATIENT)
Dept: FAMILY MEDICINE | Facility: OTHER | Age: 73
End: 2023-11-28
Payer: COMMERCIAL

## 2023-11-28 VITALS
DIASTOLIC BLOOD PRESSURE: 68 MMHG | BODY MASS INDEX: 25.87 KG/M2 | TEMPERATURE: 98 F | WEIGHT: 191 LBS | RESPIRATION RATE: 16 BRPM | SYSTOLIC BLOOD PRESSURE: 120 MMHG | OXYGEN SATURATION: 98 % | HEIGHT: 72 IN | HEART RATE: 72 BPM

## 2023-11-28 DIAGNOSIS — Z01.818 PREOP GENERAL PHYSICAL EXAM: Primary | ICD-10-CM

## 2023-11-28 DIAGNOSIS — H25.9 AGE-RELATED CATARACT OF BOTH EYES, UNSPECIFIED AGE-RELATED CATARACT TYPE: ICD-10-CM

## 2023-11-28 DIAGNOSIS — E78.5 HYPERLIPIDEMIA LDL GOAL <70: ICD-10-CM

## 2023-11-28 DIAGNOSIS — E11.9 TYPE 2 DIABETES, HBA1C GOAL < 7% (H): ICD-10-CM

## 2023-11-28 LAB
ANION GAP SERPL CALCULATED.3IONS-SCNC: 11 MMOL/L (ref 7–15)
BUN SERPL-MCNC: 21.8 MG/DL (ref 8–23)
CALCIUM SERPL-MCNC: 9.5 MG/DL (ref 8.8–10.2)
CHLORIDE SERPL-SCNC: 101 MMOL/L (ref 98–107)
CREAT SERPL-MCNC: 1.01 MG/DL (ref 0.67–1.17)
DEPRECATED HCO3 PLAS-SCNC: 24 MMOL/L (ref 22–29)
EGFRCR SERPLBLD CKD-EPI 2021: 78 ML/MIN/1.73M2
ERYTHROCYTE [DISTWIDTH] IN BLOOD BY AUTOMATED COUNT: 12.4 % (ref 10–15)
GLUCOSE SERPL-MCNC: 254 MG/DL (ref 70–99)
HBA1C MFR BLD: 10.3 % (ref 0–5.6)
HCT VFR BLD AUTO: 42.8 % (ref 40–53)
HGB BLD-MCNC: 14.3 G/DL (ref 13.3–17.7)
MCH RBC QN AUTO: 30.7 PG (ref 26.5–33)
MCHC RBC AUTO-ENTMCNC: 33.4 G/DL (ref 31.5–36.5)
MCV RBC AUTO: 92 FL (ref 78–100)
PLATELET # BLD AUTO: 230 10E3/UL (ref 150–450)
POTASSIUM SERPL-SCNC: 5 MMOL/L (ref 3.4–5.3)
RBC # BLD AUTO: 4.66 10E6/UL (ref 4.4–5.9)
SODIUM SERPL-SCNC: 136 MMOL/L (ref 135–145)
WBC # BLD AUTO: 8.1 10E3/UL (ref 4–11)

## 2023-11-28 PROCEDURE — 85027 COMPLETE CBC AUTOMATED: CPT | Performed by: PHYSICIAN ASSISTANT

## 2023-11-28 PROCEDURE — 83036 HEMOGLOBIN GLYCOSYLATED A1C: CPT | Performed by: PHYSICIAN ASSISTANT

## 2023-11-28 PROCEDURE — 36415 COLL VENOUS BLD VENIPUNCTURE: CPT | Performed by: PHYSICIAN ASSISTANT

## 2023-11-28 PROCEDURE — 99214 OFFICE O/P EST MOD 30 MIN: CPT | Performed by: PHYSICIAN ASSISTANT

## 2023-11-28 PROCEDURE — 80048 BASIC METABOLIC PNL TOTAL CA: CPT | Performed by: PHYSICIAN ASSISTANT

## 2023-11-28 RX ORDER — RESPIRATORY SYNCYTIAL VIRUS VACCINE 120MCG/0.5
0.5 KIT INTRAMUSCULAR ONCE
Qty: 1 EACH | Refills: 0 | Status: CANCELLED | OUTPATIENT
Start: 2023-11-28 | End: 2023-11-28

## 2023-11-28 RX ORDER — LANCETS
EACH MISCELLANEOUS
Qty: 100 EACH | Refills: 6 | Status: SHIPPED | OUTPATIENT
Start: 2023-11-28
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
## 2023-11-28 NOTE — PATIENT INSTRUCTIONS
Preparing for Your Surgery  Getting started  A nurse will call you to review your health history and instructions. They will give you an arrival time based on your scheduled surgery time. Please be ready to share:  Your doctor's clinic name and phone number  Your medical, surgical, and anesthesia history  A list of allergies and sensitivities  A list of medicines, including herbal treatments and over-the-counter drugs  Whether the patient has a legal guardian (ask how to send us the papers in advance)  Please tell us if you're pregnant--or if there's any chance you might be pregnant. Some surgeries may injure a fetus (unborn baby), so they require a pregnancy test. Surgeries that are safe for a fetus don't always need a test, and you can choose whether to have one.   If you have a child who's having surgery, please ask for a copy of Preparing for Your Child's Surgery.    Preparing for surgery  Within 10 to 30 days of surgery: Have a pre-op exam (sometimes called an H&P, or History and Physical). This can be done at a clinic or pre-operative center.  If you're having a , you may not need this exam. Talk to your care team.  At your pre-op exam, talk to your care team about all medicines you take. If you need to stop any medicines before surgery, ask when to start taking them again.  We do this for your safety. Many medicines can make you bleed too much during surgery. Some change how well surgery (anesthesia) drugs work.  Call your insurance company to let them know you're having surgery. (If you don't have insurance, call [phone].)  Call your clinic if there's any change in your health. This includes signs of a cold or flu (sore throat, runny nose, cough, rash, fever). It also includes a scrape or scratch near the surgery site.  If you have questions on the day of surgery, call your hospital or surgery center.  Eating and drinking guidelines  For your safety: Unless your surgeon tells you otherwise,  follow the guidelines below.  Eat and drink as usual until 8 hours before you arrive for surgery. After that, no food or milk.  HOLD all oral diabetic medications on the day of the surgery   Drink clear liquids until 2 hours before you arrive. These are liquids you can see through, like water, Gatorade, and Propel Water. They also include plain black coffee and tea (no cream or milk), candy, and breath mints. You can spit out gum when you arrive.  If you drink alcohol: Stop drinking it the night before surgery.  If your care team tells you to take medicine on the morning of surgery, it's okay to take it with a sip of water.  Preventing infection  Shower or bathe the night before and morning of your surgery. Follow the instructions your clinic gave you. (If no instructions, use regular soap.)  Don't shave or clip hair near your surgery site. We'll remove the hair if needed.  Don't smoke or vape the morning of surgery. You may chew nicotine gum up to 2 hours before surgery. A nicotine patch is okay.  Note: Some surgeries require you to completely quit smoking and nicotine. Check with your surgeon.  Your care team will make every effort to keep you safe from infection. We will:  Clean our hands often with soap and water (or an alcohol-based hand rub).  Clean the skin at your surgery site with a special soap that kills germs.  Give you a special gown to keep you warm. (Cold raises the risk of infection.)  Wear special hair covers, masks, gowns and gloves during surgery.  Give antibiotic medicine, if prescribed. Not all surgeries need antibiotics.  What to bring on the day of surgery  Photo ID and insurance card  Copy of your health care directive, if you have one  Glasses and hearing aids (bring cases)  You can't wear contacts during surgery  Inhaler and eye drops, if you use them (tell us about these when you arrive)  CPAP machine or breathing device, if you use them  A few personal items, if spending the night  If you  have . . .  A pacemaker, ICD (cardiac defibrillator) or other implant: Bring the ID card.  An implanted stimulator: Bring the remote control.  A legal guardian: Bring a copy of the certified (court-stamped) guardianship papers.  Please remove any jewelry, including body piercings. Leave jewelry and other valuables at home.  If you're going home the day of surgery  You must have a responsible adult drive you home. They should stay with you overnight as well.  If you don't have someone to stay with you, and you aren't safe to go home alone, we may keep you overnight. Insurance often won't pay for this.  After surgery  If it's hard to control your pain or you need more pain medicine, please call your surgeon's office.  Questions?    - Beta Blockers: Continue taking on the day of surgery.   - Statins: Continue taking on the day of surgery.    - metformin: HOLD day of surgery.   - sulfonylurea (e.g. glyburide, glipizide): HOLD day of surgery   - SGLT2 Inhibitor (canagliflozin, dapagliflozin, or empagliflozin): HOLD 3 days before surgery.   For informational purposes only. Not to replace the advice of your health care provider. Copyright   2003, 2019 [employer]. All rights reserved. Clinically reviewed by Belinda Ceballos MD. Sensitive Object [id number] - REV 12/22.

## 2023-11-30 ENCOUNTER — TELEPHONE (OUTPATIENT)
Dept: FAMILY MEDICINE | Facility: OTHER | Age: 73
End: 2023-11-30
Payer: COMMERCIAL

## 2023-11-30 NOTE — TELEPHONE ENCOUNTER
Please call patient to inform him that as his sugar levels have risen I would like him to schedule with MTM and start insulin. I have put in referral for MTM for him to schedule appointment, but received a message that they have not been able to reach him.     I do not recommend that he proceeds with his surgery without scheduling with MTM and have reflected this on his pre-op note. Having surgery without this additional level of oversight places him at an increased risk for complications.     Please let me know the date of scheduling with MTM and I can update the pre-op note to reflect this for his surgeon to see.    Rigo Rivas PA-C on 11/30/2023 at 1:09 PM

## 2023-11-30 NOTE — LETTER
December 6, 2023      Genaro Barbosa  [address] [place] [address] [address] [place] [id number]        Dear Genaro,     We have been attemptimg to reach out to you. Per your provider Rigo Rivas your sugar levels have risen he would like you to schedule with MTM and start insulin. You do have a put in referral for MTM to schedule appointment, but received a message that they have not been able to reach you.      Your provider would not recommend that you proceeds with your surgery without scheduling with MTM and have reflected this on your pre-op note. Having surgery without this additional level of oversight places him at an increased risk for complications.      Please let me know the date of scheduling with MTM and I can update the pre-op note to reflect this for his surgeon to see.    Call Our clinic back at [phone] or reach out to us by my-chart message.      Sincerely,        [facility] Team

## 2023-11-30 NOTE — TELEPHONE ENCOUNTER
MTM referral from: [facility] visit (referral by provider)    MTM referral outreach attempt #2 on November 30, 2023 at 10:25 AM      Outcome: Patient not reachable after several attempts, will route to MT Pharmacist/Provider as an FYI.  [facility] scheduling number is .  Thank you for the referral.    Use vbc  for the carrier/Plan on the flowsheet      [employer] Message Sent    Alexus Meyer - [facility]

## 2023-12-04 NOTE — TELEPHONE ENCOUNTER
Attempted to call patient with the following message below. Left a voicemail for the patient to call the clinic back.    Benita Moeller MA

## 2023-12-05 RX ORDER — MOXIFLOXACIN 5 MG/ML
1 SOLUTION/ DROPS OPHTHALMIC
Status: CANCELLED | OUTPATIENT
Start: 2023-12-05

## 2023-12-05 RX ORDER — CYCLOPENTOLATE HYDROCHLORIDE 10 MG/ML
1 SOLUTION/ DROPS OPHTHALMIC
Status: CANCELLED | OUTPATIENT
Start: 2023-12-05

## 2023-12-05 RX ORDER — PROPARACAINE HYDROCHLORIDE 5 MG/ML
1 SOLUTION/ DROPS OPHTHALMIC ONCE
Status: CANCELLED | OUTPATIENT
Start: 2023-12-05 | End: 2023-12-05

## 2023-12-05 RX ORDER — PHENYLEPHRINE HYDROCHLORIDE 25 MG/ML
1 SOLUTION/ DROPS OPHTHALMIC
Status: CANCELLED | OUTPATIENT
Start: 2023-12-05

## 2023-12-05 RX ORDER — DICLOFENAC SODIUM 1 MG/ML
1 SOLUTION/ DROPS OPHTHALMIC
Status: CANCELLED | OUTPATIENT
Start: 2023-12-05

## 2023-12-06 ENCOUNTER — TELEPHONE (OUTPATIENT)
Dept: FAMILY MEDICINE | Facility: OTHER | Age: 73
End: 2023-12-06
Payer: COMMERCIAL

## 2023-12-06 NOTE — TELEPHONE ENCOUNTER
Patient called back and has appointment with MTM on 12/8.  Patient is asking if provider can addend pre op.  Patient was going to call [facility] and reschedule surgery.  Patient would like call back.  Ok to leave message.

## 2023-12-06 NOTE — TELEPHONE ENCOUNTER
Reason for Call:  Appointment Request    Patient requesting this type of appt: Procedure: Eye Surgery    Requested provider: Rigo Rivas    Reason patient unable to be scheduled: Not within requested timeframe    When does patient want to be seen/preferred time:  ASAP    Comments: Requesting sooner in-person appt to reschedule Eye appt - wanted to meet with PCP     Could we send this information to you in [facility] or would you prefer to receive a phone call?:   Patient would prefer a phone call   Okay to leave a detailed message?: Yes at Cell number on file:    Telephone Information:   Mobile [phone]       Call taken on 12/6/2023 at 10:30 AM by Justice Siu

## 2023-12-07 ENCOUNTER — MEDICAL CORRESPONDENCE (OUTPATIENT)
Dept: SURGERY | Facility: CLINIC | Age: 73
End: 2023-12-07
Payer: COMMERCIAL

## 2023-12-07 NOTE — TELEPHONE ENCOUNTER
Pre-op has been updated to reflect this. Please reach out to patient to inform him of this. If needed, can refax the pre-op note to Isaura Campuzano.    Thanks,  Rigo Rivas PA-C on 12/7/2023 at 9:45 AM

## 2023-12-07 NOTE — TELEPHONE ENCOUNTER
Called patient and notified.  Patient stated he will call [facility] and reschedule his surgery.

## 2023-12-08 ENCOUNTER — OFFICE VISIT (OUTPATIENT)
Dept: PHARMACY | Facility: CLINIC | Age: 73
End: 2023-12-08
Payer: COMMERCIAL

## 2023-12-08 VITALS — DIASTOLIC BLOOD PRESSURE: 66 MMHG | WEIGHT: 191.4 LBS | BODY MASS INDEX: 25.96 KG/M2 | SYSTOLIC BLOOD PRESSURE: 122 MMHG

## 2023-12-08 DIAGNOSIS — I25.10 CORONARY ARTERY DISEASE INVOLVING NATIVE CORONARY ARTERY OF NATIVE HEART WITHOUT ANGINA PECTORIS: ICD-10-CM

## 2023-12-08 DIAGNOSIS — I10 ESSENTIAL HYPERTENSION: ICD-10-CM

## 2023-12-08 DIAGNOSIS — E78.5 HYPERLIPIDEMIA LDL GOAL <70: ICD-10-CM

## 2023-12-08 DIAGNOSIS — K21.9 GASTROESOPHAGEAL REFLUX DISEASE, UNSPECIFIED WHETHER ESOPHAGITIS PRESENT: ICD-10-CM

## 2023-12-08 DIAGNOSIS — E11.9 TYPE 2 DIABETES, HBA1C GOAL < 7% (H): Primary | ICD-10-CM

## 2023-12-08 PROCEDURE — 99207 PR NO CHARGE LOS: CPT | Performed by: PHARMACIST

## 2023-12-08 NOTE — PATIENT INSTRUCTIONS
"Recommendations from today's MTM visit:                                                    MTM (medication therapy management) is a service provided by a clinical pharmacist designed to help you get the most of out of your medicines.   Today we reviewed what your medicines are for, how to know if they are working, that your medicines are safe and how to make your medicine regimen as easy as possible.      Start LANTUS INSULIN 10 units under the skin every evening.  If your fasting blood sugars are consistently above 150 mg/dL (but less than 180 mg/dL in the morning) increase to 12 units every evening after 3 days.  If you are seeing blood sugars consistently above 180 mg/dL increase to 14 units under the skin every evening.     Try to check your blood sugars two to three times daily - fasting sugar for sure, then try to get before lunch, and before suppertime blood sugars.     I placed an order for insulin pen needles.     Follow-up: I will call you on Thursday, December 14th at 12 PM    It was great speaking with you today.  I value your experience and would be very thankful for your time in providing feedback in our clinic survey. In the next few days, you may receive an email or text message from [employer] with a link to a survey related to your  clinical pharmacist.\"     To schedule another MTM appointment, please call the clinic directly or you may call the MTM scheduling line at [phone] or toll-free at [phone].     My Clinical Pharmacist's contact information:                                                      Please feel free to contact me with any questions or concerns you have.      Homar Falcon, F[place], [facility]  Medication Therapy Management Pharmacist  Pager: [phone]  "

## 2023-12-08 NOTE — PROGRESS NOTES
Medication Therapy Management (MTM) Encounter    ASSESSMENT:                            Medication Adherence/Access: No issues identified    Diabetes: A1c is not at goal of <8%.  Fasting blood sugars are improved. Would benefit from plan to start basal insulin and increasing dose based upon fasting sugars.     CAD/Hypertension: Blood pressure is at goal of <130/80 mmHg.     Hyperlipidemia: Stable.     GERD: Stable.     PLAN:                            Start LANTUS INSULIN 10 units under the skin every evening.  If your fasting blood sugars are consistently above 150 mg/dL (but less than 180 mg/dL in the morning) increase to 12 units every evening after 3 days.  If you are seeing blood sugars consistently above 180 mg/dL increase to 14 units under the skin every evening.     Try to check your blood sugars two to three times daily - fasting sugar for sure, then try to get before lunch, and before suppertime blood sugars.     I placed an order for insulin pen needles.     Follow-up: I will call you on Thursday, December 14th at 12 PM    SUBJECTIVE/OBJECTIVE:                          Genaro Barbosa is a 73 year old male coming in for an initial visit. He was referred to me from Rigo Rivas PA-C.      Reason for visit: Diabetes Management.    Allergies/ADRs: Reviewed in chart  Past Medical History: Reviewed in chart  Tobacco: He reports that he has quit smoking. He has never used smokeless tobacco.  Alcohol: 10 or more beverages /week  Caffeine: 3 cups per day.    Medication Adherence/Access: Patient uses pill box(es).  Patient takes medications 2 time(s) per day.   Per patient, misses medication 0 times per week.   Medication barriers: none.   The patient fills medications at [place]: NO, fills medications at [facility] in [place], [place].    Diabetes   Glipizide IR 10 mg twice daily  Metformin IR 1,000 mg twice daily  Jardiance 25 mg daily (just restarted in last week) - feels this really helps  Here today to  learn to start insulin. States that his wife takes insulin.    Patient is not experiencing side effects.  Blood sugar monitorin time(s) daily; Ranges: (patient reported) fasting- 170-180s mg/dL   Current diabetes symptoms: none  Diet/Exercise: works as a .  Does not eat a lot of carbohydrates usually with breakfast. Maybe a burger for lunch, but not much else.  Dinner varies a lot - sometimes higher carbohydrates.      Eye exam in the last 12 months? No - likely has had but cannot recall.  Foot exam is up to date  Urine Albumin:   Lab Results   Component Value Date    UMALCR 35.76 (H) 2023      Lab Results   Component Value Date    A1C 10.3 (H) 2023     Hypertension   CAD/Hypertension:  Metoprolol tartrate 50 mg twice daily  Patient reports no current medication side effects  Patient does not self-monitor blood pressure.       BP Readings from Last 3 Encounters:   23 122/66   23 120/68   23 108/62     Pulse Readings from Last 3 Encounters:   23 72   23 69   23 84     Hyperlipidemia   Rosuvastatin 40 mg daily  Patient reports no significant myalgias or other side effects.  The 10-year ASCVD risk score (Gena FELIX, et al., 2019) is: 41.5%    Values used to calculate the score:      Age: 73 years      Sex: Male      Is Non- : No      Diabetic: Yes      Tobacco smoker: No      Systolic Blood Pressure: 122 mmHg      Is BP treated: Yes      HDL Cholesterol: 57 mg/dL      Total Cholesterol: 173 mg/dL     Recent Labs   Lab Test 23  1108 22  1104   CHOL 173 156   HDL 57 42   LDL 84 76   TRIG 161* 188*       GERD:   Omeprazole 20 mg daily as needed - not often lately   Patient reports no current symptoms. Denies any known side effects.     Today's Vitals: /66   Wt 191 lb 6.4 oz (86.8 kg)   BMI 25.96 kg/m      Wt Readings from Last 5 Encounters:   23 191 lb 6.4 oz (86.8 kg)   23 191 lb (86.6 kg)    11/20/23 191 lb (86.6 kg)   11/08/23 187 lb 6.4 oz (85 kg)   08/22/23 197 lb (89.4 kg)     ----------------  Post Discharge Medication Reconciliation Status: discharge medications reconciled and changed, per note/orders.    I spent 30 minutes with this patient today. All changes were made via collaborative practice agreement with Rigo Rivas PA-C. A copy of the visit note was provided to the patient's provider(s).    A summary of these recommendations was given to the patient.    Homar Falcon, PharmD, BCACP  Medication Therapy Management Pharmacist  Pager: [phone]     Medication Therapy Recommendations  Type 2 diabetes mellitus with circulatory disorder, without long-term current use of insulin (H)    Current Medication: insulin glargine (LANTUS PEN) 100 UNIT/ML pen   Rationale: Synergistic therapy - Needs additional medication therapy - Indication   Recommendation: Start Medication - Lantus SoloStar 100 UNIT/ML soln - Start Lantus 10 units under the skin every evening. Increase dose as directed.   Status: Accepted per CPA

## 2023-12-14 ENCOUNTER — VIRTUAL VISIT (OUTPATIENT)
Dept: PHARMACY | Facility: CLINIC | Age: 73
End: 2023-12-14
Payer: COMMERCIAL

## 2023-12-14 DIAGNOSIS — E11.9 TYPE 2 DIABETES, HBA1C GOAL < 7% (H): Primary | ICD-10-CM

## 2023-12-14 PROCEDURE — 99207 PR NO CHARGE LOS: CPT | Performed by: PHARMACIST

## 2023-12-14 NOTE — PROGRESS NOTES
Medication Therapy Management (MTM) Encounter    ASSESSMENT:                            Medication Adherence/Access: No issues identified    Type 2 Diabetes: Fasting blood sugars have improved >50 mg/dL with introduction of basal insulin despite discontinuing SGLT2 inhibitor.  May benefit from further monitoring to make sure pre/post-prandial sugars are closer to goal, but much improved control.     PLAN:                            Continue to monitor your blood sugars - try to get some 2 hours after a meal in addition to your fasting sugars.     Follow-up: I will call you on  at 11 AM    SUBJECTIVE/OBJECTIVE:                          Genaro Barbosa is a 73 year old male called for a follow-up visit from 2023.       Reason for visit: Diabetes Follow-Up.    Allergies/ADRs: Reviewed in chart  Past Medical History: Reviewed in chart  Tobacco: He reports that he has quit smoking. He has never used smokeless tobacco.  Alcohol: 10 or more beverages /week    Medication Adherence/Access: Patient uses pill box(es).  Patient takes medications 2 time(s) per day.   Per patient, misses medication 0 times per week.   Medication barriers: none.   The patient fills medications at [place]: NO, fills medications at [facility] in [place], [place].    Diabetes Glipizide IR 10 mg twice daily  Metformin IR 1,000 mg twice daily  He stopped Jardiance on his own after starting the insulin. He does have concerns about long-term costs   Lantus 10 units daily  Patient is not experiencing side effects.  Blood sugar monitorin time(s) daily; Ranges: (patient reported) fasting- 126-138 mg/dL, pre-meal sugars - typically less than 200 mg/dL  Current diabetes symptoms: none  Diet/Exercise: works as a .  Does not eat a lot of carbohydrates usually with breakfast. Maybe a burger for lunch, but not much else.  Dinner varies a lot - sometimes higher carbohydrates.      Eye exam in the last 12 months?  No  Foot exam is up to date  Urine Albumin:   Lab Results   Component Value Date    UMALCR 35.76 (H) 08/22/2023      Lab Results   Component Value Date    A1C 10.3 (H) 11/28/2023    A1C 9.4 (H) 08/22/2023    A1C 9.3 (H) 12/23/2022       Today's Vitals: There were no vitals taken for this visit.    BP Readings from Last 3 Encounters:   12/08/23 122/66   11/28/23 120/68   11/20/23 108/62     Wt Readings from Last 5 Encounters:   12/08/23 191 lb 6.4 oz (86.8 kg)   11/28/23 191 lb (86.6 kg)   11/20/23 191 lb (86.6 kg)   11/08/23 187 lb 6.4 oz (85 kg)   08/22/23 197 lb (89.4 kg)     ----------------      I spent 7 minutes with this patient today. All changes were made via collaborative practice agreement with Rigo Rivas PA-C. A copy of the visit note was provided to the patient's provider(s).    A summary of these recommendations was given to the patient.    Homar Falcon, PharmD, [facility]  Medication Therapy Management Pharmacist  Pager: [phone]    Telemedicine Visit Details  Type of service:  Telephone visit  Start Time:  11:53 AM  End Time:  12:00 PM     Medication Therapy Recommendations  No medication therapy recommendations to display

## 2023-12-15 NOTE — PATIENT INSTRUCTIONS
"Recommendations from today's MTM visit:                                                    MTM (medication therapy management) is a service provided by a clinical pharmacist designed to help you get the most of out of your medicines.      Continue to monitor your blood sugars - try to get some 2 hours after a meal in addition to your fasting sugars.     Follow-up: I will call you on Monday, December 18th at 11 AM    It was great speaking with you today.  I value your experience and would be very thankful for your time in providing feedback in our clinic survey. In the next few days, you may receive an email or text message from [employer] with a link to a survey related to your  clinical pharmacist.\"     To schedule another MTM appointment, please call the clinic directly or you may call the MTM scheduling line at [phone] or toll-free at [phone].     My Clinical Pharmacist's contact information:                                                      Please feel free to contact me with any questions or concerns you have.      Homar Falcon, F[place], [facility]  Medication Therapy Management Pharmacist  Pager: [phone]  "

## 2023-12-18 ENCOUNTER — VIRTUAL VISIT (OUTPATIENT)
Dept: PHARMACY | Facility: CLINIC | Age: 73
End: 2023-12-18
Payer: COMMERCIAL

## 2023-12-18 DIAGNOSIS — E11.9 TYPE 2 DIABETES, HBA1C GOAL < 7% (H): Primary | ICD-10-CM

## 2023-12-18 PROCEDURE — 99207 PR NO CHARGE LOS: CPT | Performed by: PHARMACIST

## 2023-12-18 NOTE — PROGRESS NOTES
Medication Therapy Management (MTM) Encounter    ASSESSMENT:                            Medication Adherence/Access: No issues identified    Type 2 Diabetes: Blood sugars remain below 200 mg/dL throughout the day per patient report. Would benefit from continuing current insulin dose and monitoring.     PLAN:                            Continue to monitor your blood sugars closely.     Follow-up: 2-4 weeks or sooner if needed    SUBJECTIVE/OBJECTIVE:                          Genaro Barbosa is a 73 year old male called for a follow-up visit from 2023.       Reason for visit: Diabetes Follow-Up.    Allergies/ADRs: Reviewed in chart  Past Medical History: Reviewed in chart  Tobacco: He reports that he has quit smoking. He has never used smokeless tobacco.  Alcohol: 10 or more beverages /week    Medication Adherence/Access: Patient uses pill box(es).  Patient takes medications 2 time(s) per day.   Per patient, misses medication 0 times per week.   Medication barriers: none.   The patient fills medications at [place]: NO, fills medications at [facility] in [place], [place].    Diabetes Glipizide IR 10 mg twice daily  Metformin IR 1,000 mg twice daily  He stopped Jardiance on his own after starting the insulin - tried to take a few tablets/cut them in half but couldn't do. He does have concerns about long-term costs   Lantus 10 units daily  Patient is not experiencing side effects.  Has his cataract procedures upcoming.   Blood sugar monitorin time(s) daily; Ranges: (patient reported) fasting- 121 mg/dL this morning, all others less than 150 mg/dL pre-meal sugars - typically less than 200 mg/dL  Current diabetes symptoms: none  Diet/Exercise: works as a .  Does not eat a lot of carbohydrates usually with breakfast. Maybe a burger for lunch, but not much else.  Dinner varies a lot - sometimes higher carbohydrates.        Eye exam in the last 12 months? No  Foot exam is up to date  Urine  Albumin:   Lab Results   Component Value Date    UMALCR 35.76 (H) 08/22/2023      Lab Results   Component Value Date    A1C 10.3 (H) 11/28/2023     Today's Vitals: There were no vitals taken for this visit.    BP Readings from Last 3 Encounters:   12/08/23 122/66   11/28/23 120/68   11/20/23 108/62     Wt Readings from Last 5 Encounters:   12/08/23 191 lb 6.4 oz (86.8 kg)   11/28/23 191 lb (86.6 kg)   11/20/23 191 lb (86.6 kg)   11/08/23 187 lb 6.4 oz (85 kg)   08/22/23 197 lb (89.4 kg)     ----------------      I spent 7 minutes with this patient today. A copy of the visit note was provided to the patient's provider(s).    A summary of these recommendations was sent via [employer].    Homar Falcon, PharmD, BCACP  Medication Therapy Management Pharmacist  Pager: [phone]    Telemedicine Visit Details  Type of service:  Telephone visit  Start Time:  11:10 AM  End Time:  11:17 AM     Medication Therapy Recommendations  No medication therapy recommendations to display

## 2023-12-18 NOTE — PATIENT INSTRUCTIONS
"Recommendations from today's MTM visit:                                                    MTM (medication therapy management) is a service provided by a clinical pharmacist designed to help you get the most of out of your medicines.       Continue to monitor your blood sugars closely.     Follow-up: 2-4 weeks or sooner if needed    It was great speaking with you today.  I value your experience and would be very thankful for your time in providing feedback in our clinic survey. In the next few days, you may receive an email or text message from [employer] with a link to a survey related to your  clinical pharmacist.\"     To schedule another MTM appointment, please call the clinic directly or you may call the MTM scheduling line at [phone] or toll-free at [phone].     My Clinical Pharmacist's contact information:                                                      Please feel free to contact me with any questions or concerns you have.      Homar Falcon, PharmD, [facility]  Medication Therapy Management Pharmacist  Pager: [phone]  "

## 2023-12-21 ENCOUNTER — ANESTHESIA EVENT (OUTPATIENT)
Dept: SURGERY | Facility: CLINIC | Age: 73
End: 2023-12-21
Payer: COMMERCIAL

## 2023-12-21 ENCOUNTER — ANESTHESIA (OUTPATIENT)
Dept: SURGERY | Facility: CLINIC | Age: 73
End: 2023-12-21
Payer: COMMERCIAL

## 2023-12-21 ENCOUNTER — HOSPITAL ENCOUNTER (OUTPATIENT)
Facility: CLINIC | Age: 73
Discharge: HOME | End: 2023-12-21
Attending: OPHTHALMOLOGY | Admitting: OPHTHALMOLOGY
Payer: COMMERCIAL

## 2023-12-21 VITALS
HEART RATE: 69 BPM | DIASTOLIC BLOOD PRESSURE: 71 MMHG | RESPIRATION RATE: 16 BRPM | TEMPERATURE: 98.2 F | SYSTOLIC BLOOD PRESSURE: 105 MMHG | OXYGEN SATURATION: 95 %

## 2023-12-21 LAB — GLUCOSE BLDC GLUCOMTR-MCNC: 221 MG/DL (ref 70–99)

## 2023-12-21 PROCEDURE — 82962 GLUCOSE BLOOD TEST: CPT

## 2023-12-21 PROCEDURE — V2632 POST CHMBR INTRAOCULAR LENS: HCPCS | Performed by: OPHTHALMOLOGY

## 2023-12-21 PROCEDURE — 250N000011 HC RX IP 250 OP 636: Performed by: NURSE ANESTHETIST, CERTIFIED REGISTERED

## 2023-12-21 PROCEDURE — 360N000007 HC CATARACT SURGICAL PACKAGE: Performed by: OPHTHALMOLOGY

## 2023-12-21 PROCEDURE — 250N000009 HC RX 250: Performed by: NURSE ANESTHETIST, CERTIFIED REGISTERED

## 2023-12-21 PROCEDURE — 761N000008 HC RECOVERY CATRACT PACKAGE: Performed by: OPHTHALMOLOGY

## 2023-12-21 PROCEDURE — 250N000011 HC RX IP 250 OP 636: Mod: JZ | Performed by: OPHTHALMOLOGY

## 2023-12-21 PROCEDURE — 250N000009 HC RX 250: Performed by: OPHTHALMOLOGY

## 2023-12-21 PROCEDURE — 370N000004 HC ANESTHESIA CATARACT PACKAGE: Performed by: OPHTHALMOLOGY

## 2023-12-21 DEVICE — EYE IMP IOL AMO PCL TECNIS ZCB00 20.0: Type: IMPLANTABLE DEVICE | Site: EYE | Status: FUNCTIONAL

## 2023-12-21 RX ORDER — SODIUM CHLORIDE, SODIUM LACTATE, POTASSIUM CHLORIDE, CALCIUM CHLORIDE 600; 310; 30; 20 MG/100ML; MG/100ML; MG/100ML; MG/100ML
INJECTION, SOLUTION INTRAVENOUS CONTINUOUS
Status: DISCONTINUED | OUTPATIENT
Start: 2023-12-21 | End: 2023-12-21 | Stop reason: HOSPADM

## 2023-12-21 RX ORDER — PROPARACAINE HYDROCHLORIDE 5 MG/ML
1 SOLUTION/ DROPS OPHTHALMIC ONCE
Status: COMPLETED | OUTPATIENT
Start: 2023-12-21 | End: 2023-12-21

## 2023-12-21 RX ORDER — ONDANSETRON 4 MG/1
4 TABLET, ORALLY DISINTEGRATING ORAL EVERY 30 MIN PRN
Status: DISCONTINUED | OUTPATIENT
Start: 2023-12-21 | End: 2023-12-21 | Stop reason: HOSPADM

## 2023-12-21 RX ORDER — PROPARACAINE HYDROCHLORIDE 5 MG/ML
1 SOLUTION/ DROPS OPHTHALMIC ONCE
Status: DISCONTINUED | OUTPATIENT
Start: 2023-12-21 | End: 2023-12-21 | Stop reason: HOSPADM

## 2023-12-21 RX ORDER — TROPICAMIDE 10 MG/ML
1 SOLUTION/ DROPS OPHTHALMIC
Status: COMPLETED | OUTPATIENT
Start: 2023-12-21 | End: 2023-12-21

## 2023-12-21 RX ORDER — TIMOLOL MALEATE 5 MG/ML
SOLUTION/ DROPS OPHTHALMIC PRN
Status: DISCONTINUED | OUTPATIENT
Start: 2023-12-21 | End: 2023-12-21 | Stop reason: HOSPADM

## 2023-12-21 RX ORDER — PHENYLEPHRINE HYDROCHLORIDE 25 MG/ML
1 SOLUTION/ DROPS OPHTHALMIC
Status: COMPLETED | OUTPATIENT
Start: 2023-12-21 | End: 2023-12-21

## 2023-12-21 RX ORDER — ACETAMINOPHEN 325 MG/1
975 TABLET ORAL EVERY 6 HOURS PRN
Status: DISCONTINUED | OUTPATIENT
Start: 2023-12-21 | End: 2023-12-21 | Stop reason: HOSPADM

## 2023-12-21 RX ORDER — PREDNISOLONE ACETATE 1 %
SUSPENSION, DROPS(FINAL DOSAGE FORM)(ML) OPHTHALMIC (EYE) PRN
Status: DISCONTINUED | OUTPATIENT
Start: 2023-12-21 | End: 2023-12-21 | Stop reason: HOSPADM

## 2023-12-21 RX ORDER — MOXIFLOXACIN 5 MG/ML
1 SOLUTION/ DROPS OPHTHALMIC
Status: COMPLETED | OUTPATIENT
Start: 2023-12-21 | End: 2023-12-21

## 2023-12-21 RX ORDER — ONDANSETRON 2 MG/ML
4 INJECTION INTRAMUSCULAR; INTRAVENOUS EVERY 30 MIN PRN
Status: DISCONTINUED | OUTPATIENT
Start: 2023-12-21 | End: 2023-12-21 | Stop reason: HOSPADM

## 2023-12-21 RX ADMIN — MOXIFLOXACIN 1 DROP: 5 SOLUTION/ DROPS OPHTHALMIC at 11:40

## 2023-12-21 RX ADMIN — TROPICAMIDE 1 DROP: 10 SOLUTION/ DROPS OPHTHALMIC at 11:50

## 2023-12-21 RX ADMIN — MIDAZOLAM 1 MG: 1 INJECTION INTRAMUSCULAR; INTRAVENOUS at 12:09

## 2023-12-21 RX ADMIN — PHENYLEPHRINE HYDROCHLORIDE 1 DROP: 25 SOLUTION/ DROPS OPHTHALMIC at 11:40

## 2023-12-21 RX ADMIN — PHENYLEPHRINE HYDROCHLORIDE 1 DROP: 25 SOLUTION/ DROPS OPHTHALMIC at 11:45

## 2023-12-21 RX ADMIN — MIDAZOLAM 1 MG: 1 INJECTION INTRAMUSCULAR; INTRAVENOUS at 12:16

## 2023-12-21 RX ADMIN — TROPICAMIDE 1 DROP: 10 SOLUTION/ DROPS OPHTHALMIC at 11:45

## 2023-12-21 RX ADMIN — MOXIFLOXACIN 1 DROP: 5 SOLUTION/ DROPS OPHTHALMIC at 11:50

## 2023-12-21 RX ADMIN — LIDOCAINE HYDROCHLORIDE 0.1 ML: 10 INJECTION, SOLUTION EPIDURAL; INFILTRATION; INTRACAUDAL; PERINEURAL at 11:42

## 2023-12-21 RX ADMIN — MOXIFLOXACIN 1 DROP: 5 SOLUTION/ DROPS OPHTHALMIC at 11:45

## 2023-12-21 RX ADMIN — PROPARACAINE HYDROCHLORIDE 1 DROP: 5 SOLUTION/ DROPS OPHTHALMIC at 11:38

## 2023-12-21 RX ADMIN — PHENYLEPHRINE HYDROCHLORIDE 1 DROP: 25 SOLUTION/ DROPS OPHTHALMIC at 11:50

## 2023-12-21 RX ADMIN — TROPICAMIDE 1 DROP: 10 SOLUTION/ DROPS OPHTHALMIC at 11:40

## 2023-12-21 ASSESSMENT — ENCOUNTER SYMPTOMS: DYSRHYTHMIAS: 1

## 2023-12-21 ASSESSMENT — ACTIVITIES OF DAILY LIVING (ADL)
ADLS_ACUITY_SCORE: 33
ADLS_ACUITY_SCORE: 35

## 2023-12-21 ASSESSMENT — LIFESTYLE VARIABLES: TOBACCO_USE: 1

## 2023-12-21 NOTE — ANESTHESIA POSTPROCEDURE EVALUATION
Patient: Genaro Barbosa    Procedure: Procedure(s):  Phacoemulsification with standard intraocular lens implant       Anesthesia Type:  MAC    Note:  Disposition: Outpatient   Postop Pain Control: Uneventful            Sign Out: Well controlled pain   PONV: No   Neuro/Psych: Uneventful            Sign Out: Acceptable/Baseline neuro status   Airway/Respiratory: Uneventful            Sign Out: Acceptable/Baseline resp. status   CV/Hemodynamics: Uneventful            Sign Out: Acceptable CV status   Other NRE: NONE   DID A NON-ROUTINE EVENT OCCUR? No    Event details/Postop Comments:  Pt was happy with anesthesia care.  No complications.  I will follow up with the pt if needed.           Last vitals:  Vitals Value Taken Time   /86 12/21/23 1259   Temp     Pulse 73 12/21/23 1259   Resp     SpO2 96 % 12/21/23 1259   Vitals shown include unfiled device data.    Electronically Signed By: RACIEL Pelletier CRNA  December 21, 2023  1:05 PM

## 2023-12-21 NOTE — OP NOTE
[facility]  Ophthalmology Operative Note    PREOPERATIVE DIAGNOSIS: Cataract, Right eye.   POSTOPERATIVE DIAGNOSIS: Cataract, Right eye.   OPERATION: Cataract extraction with placement of posterior chamber intraocular lens in the Right eye.   ANESTHESIA: Topical   INDICATIONS FOR PROCEDURE: Genaro Barbosa was seen in the [facility] for decreased visual acuity in the Right eye. The patient was found to have a cataract in the Right eye. The risks, benefits, alternatives and goals of cataract extraction were discussed with the patient, and after adequate discussion the patient understood and agreed to these, and a signed informed consent was obtained prior to the procedure.   DESCRIPTION OF PROCEDURE: After proper patient identification, topical anesthesia was applied to the Right eye. The patient was brought to the operating room and the Right eye was prepped and draped in the usual manner for intraocular surgery. A lid speculum was placed in the Right eye. A paracentesis was then created and the anterior chamber was filled with 1% non-preserved lidocaine followed by Viscoat. A clear corneal incision was then created, tunneling 2 mm into the cornea before entering the anterior chamber. A continuous curvilinear capsulorrhexis was then created using a cystotome and Utrata forceps. Hydrodissection was carried out with BSS on a blunt-tip cannula and the lens rotated freely within the capsular bag. Phacoemulsification was then carried out using the stop and chop technique. Residual cortical material was removed using the I&A handpiece. The capsular bag was then filled with Provisc and an injectable lens was injected into the capsular bag. The lens showed good centration and stability. Residual viscoelastic was removed using the I&A handpiece. The wound was then hydrated and the anterior chamber reformed through the paracentesis. The wound was checked and found to be sealed.  Topical drops of Vigamox and a Econopred were placed in the patient's Right eye followed by a Avina shield over the top of this. The patient tolerated the procedure well without complications and was told to follow up in the clinic in the next postoperative day.     Implant Name Type Inv. Item Serial No.  Lot No. LRB No. Used Action   EYE IMP IOL MARLEN PCL TECNIS ZCB00 20.0 - [id number] Lens/Eye Implant EYE IMP IOL MARLEN PCL TECNIS ZCB00 20.0 [id number] [employer]  Right 1 Implanted        ALEXANDER HI MD

## 2023-12-21 NOTE — ANESTHESIA CARE TRANSFER NOTE
Patient: Genaro Barbosa    Procedure: Procedure(s):  Phacoemulsification with standard intraocular lens implant       Diagnosis: Cataract [H26.9]  Diagnosis Additional Information: No value filed.    Anesthesia Type:   MAC     Note:    Oropharynx: oropharynx clear of all foreign objects and spontaneously breathing  Level of Consciousness: awake  Oxygen Supplementation: room air    Independent Airway: airway patency satisfactory and stable  Dentition: dentition unchanged  Vital Signs Stable: post-procedure vital signs reviewed and stable  Report to RN Given: handoff report given  Patient transferred to: Phase II    Handoff Report: Identifed the Patient, Identified the Reponsible Provider, Reviewed the pertinent medical history, Discussed the surgical course, Reviewed Intra-OP anesthesia mangement and issues during anesthesia, Set expectations for post-procedure period and Allowed opportunity for questions and acknowledgement of understanding      Vitals:  Vitals Value Taken Time   /86 12/21/23 1259   Temp     Pulse 73 12/21/23 1259   Resp     SpO2 96 % 12/21/23 1259   Vitals shown include unfiled device data.    Electronically Signed By: RACIEL Pelletier CRNA  December 21, 2023  1:04 PM

## 2023-12-21 NOTE — ANESTHESIA PREPROCEDURE EVALUATION
Anesthesia Pre-Procedure Evaluation    Patient: Genaro Barbosa   MRN: [id number] : 1950        Procedure : Procedure(s):  Phacoemulsification with standard intraocular lens implant          Past Medical History:   Diagnosis Date    Coronary artery disease     stents x8    Diabetic eye exam (H) 10/11/12    [facility]    Fatty liver     Gallstones 11    hospitalized with RUQ pain    History of tobacco use:  - , / ppd 10/3/2011    Hyperlipidemia LDL goal < 100     Hypertension goal BP (blood pressure) < 130/80     Pyloric stenosis, congenital (H28)     Type 2 diabetes, HbA1c goal < 7% (H)       Past Surgical History:   Procedure Laterality Date    CARDIAC SURGERY      Angioplasty with stenting    HC PTA EXTREMITY ARTERY, EACH ADDITIONAL      pyloric stenosis        Allergies   Allergen Reactions    Ace Inhibitors Cough    Contrast Dye Other (See Comments)     Patient felt like ants were crawling all over him/ per patient's explaination 12-15-16/tw      Social History     Tobacco Use    Smoking status: Former    Smokeless tobacco: Never    Tobacco comments:     quit age 26   Substance Use Topics    Alcohol use: Yes     Comment: approximately 12 beers per week      Wt Readings from Last 1 Encounters:   23 86.8 kg (191 lb 6.4 oz)        Anesthesia Evaluation   Pt has had prior anesthetic. Type: General.        ROS/MED HX  ENT/Pulmonary:     (+)                tobacco use, Past use,                       Neurologic:     (+)      migraines,                          Cardiovascular: Comment: Stents placed in , , , , ,       (+)  hypertension- -  CAD -  - stent-                        dysrhythmias, a-fib,        Previous cardiac testing   Echo: Date: Results:    Stress Test:  Date: Results:    ECG Reviewed:  Date: 23 Results:  - NSR  Cath:  Date: Results:      METS/Exercise Tolerance:     Hematologic:       Musculoskeletal:       GI/Hepatic:     (+) GERD,  Asymptomatic on medication,           liver disease,       Renal/Genitourinary:     (+) renal disease, type: CRI,            Endo:     (+)  type II DM, Last HgA1c: 10.3, date: 11/28/23, Using insulin, - not using insulin pump.                Psychiatric/Substance Use:  - neg psychiatric ROS     Infectious Disease:       Malignancy:       Other:  - neg other ROS          Physical Exam    Airway        Mallampati: II   TM distance: > 3 FB   Neck ROM: full   Mouth opening: > 3 cm    Respiratory Devices and Support         Dental           Cardiovascular             Pulmonary                   OUTSIDE LABS:  CBC:   Lab Results   Component Value Date    WBC 8.1 11/28/2023    WBC 8.3 11/08/2023    HGB 14.3 11/28/2023    HGB 12.3 (L) 11/08/2023    HCT 42.8 11/28/2023    HCT 36.5 (L) 11/08/2023     11/28/2023     11/08/2023     BMP:   Lab Results   Component Value Date     11/28/2023     11/20/2023    POTASSIUM 5.0 11/28/2023    POTASSIUM 4.7 11/20/2023    CHLORIDE 101 11/28/2023    CHLORIDE 100 11/20/2023    CO2 24 11/28/2023    CO2 21 (L) 11/20/2023    BUN 21.8 11/28/2023    BUN 15.5 11/20/2023    CR 1.01 11/28/2023    CR 1.03 11/20/2023     (H) 11/28/2023     (H) 11/20/2023     COAGS:   Lab Results   Component Value Date    PTT 27 05/22/2012    INR 0.90 05/22/2012     POC:   Lab Results   Component Value Date     (H) 02/03/2011     HEPATIC:   Lab Results   Component Value Date    ALBUMIN 3.9 11/05/2023    PROTTOTAL 7.2 11/05/2023    ALT 19 11/05/2023    AST 21 11/05/2023    ALKPHOS 73 11/05/2023    BILITOTAL 1.6 (H) 11/05/2023     OTHER:   Lab Results   Component Value Date    LACT 1.0 11/06/2023    A1C 10.3 (H) 11/28/2023    TAYLOR 9.5 11/28/2023    LIPASE 109 03/09/2012    AMYLASE 70 03/09/2012    TSH 2.35 10/30/2019    CRP <5.0 03/06/2012    SED 14 03/09/2012       Anesthesia Plan    ASA Status:  3    NPO Status:  NPO Appropriate    Anesthesia Type: MAC.     - Reason for  MAC: straight local not clinically adequate              Consents    Anesthesia Plan(s) and associated risks, benefits, and realistic alternatives discussed. Questions answered and patient/representative(s) expressed understanding.     - Discussed:     - Discussed with:  Patient      - Extended Intubation/Ventilatory Support Discussed: No.      - Patient is DNR/DNI Status: No     Use of blood products discussed: No .     Postoperative Care            Comments:    Other Comments: The risks and benefits of anesthesia, and the alternatives where applicable, have been discussed with the patient, and they wish to proceed.              RACIEL Pelletier CRNA    I have reviewed the pertinent notes and labs in the chart from the past 30 days and (re)examined the patient.  Any updates or changes from those notes are reflected in this note.              # DMII: A1C = 10.3 % (Ref range: 0.0 - 5.6 %) within past 6 months  # Overweight: Estimated body mass index is 25.96 kg/m  as calculated from the following:    Height as of 11/28/23: 1.829 m (6').    Weight as of 12/8/23: 86.8 kg (191 lb 6.4 oz).

## 2023-12-21 NOTE — DISCHARGE INSTRUCTIONS
POST CATARACT SURGERY EYE INSTRUCTIONS             Eye Medications    VIGAMOX/OFLOXACIN (Tan Cap)    KETOROLAC (Wagner Cap)    PREDNISOLONE (Pink or White Cap)    If you opted for the individual bottles of eye medications follow these instructions.    *Instill one drop from each bottle into the operative eye 3 times a day until each  bottle is empty.    *Wait 5 minutes between each drop.        If your are currently being treated for glaucoma please continue your medication as normally prescribed.      Wear eye shield while sleeping for 3 days    Refrain from heavy lifting, bending, straining, or strenuous activity for 1 week.    Do not rub or push on the operative eye for 1 week (you may wipe the eye lid(s) gently with a wet wash cloth to remove matter from eyelashes).    You may bathe or shower, but do not get the eye wet for 1 month (swimming, hot tubs or other water activities).    Minor itching, scratching sensation, burning sensation, etc. is normal.  Report any severe eye pain or loss of vision.      Bring all material and medications to the office on the first post op day.     Call your surgeon at [phone] or the answering service at [phone] for any problems, questions or concerns, especially pain.

## 2023-12-24 DIAGNOSIS — E11.59 TYPE 2 DIABETES MELLITUS WITH OTHER CIRCULATORY COMPLICATION, WITHOUT LONG-TERM CURRENT USE OF INSULIN (H): ICD-10-CM

## 2023-12-26 RX ORDER — GLIPIZIDE 10 MG/1
10 TABLET ORAL
Qty: 180 TABLET | Refills: 0 | Status: SHIPPED | OUTPATIENT
Start: 2023-12-26 | End: 2024-04-03

## 2024-01-01 DIAGNOSIS — I25.10 CORONARY ARTERY DISEASE INVOLVING NATIVE CORONARY ARTERY OF NATIVE HEART WITHOUT ANGINA PECTORIS: ICD-10-CM

## 2024-01-01 DIAGNOSIS — E11.59 TYPE 2 DIABETES MELLITUS WITH OTHER CIRCULATORY COMPLICATION, WITHOUT LONG-TERM CURRENT USE OF INSULIN (H): ICD-10-CM

## 2024-01-03 RX ORDER — EMPAGLIFLOZIN 25 MG/1
25 TABLET, FILM COATED ORAL DAILY
Qty: 30 TABLET | Refills: 2 | Status: SHIPPED | OUTPATIENT
Start: 2024-01-03 | End: 2024-05-07

## 2024-01-04 ENCOUNTER — HOSPITAL ENCOUNTER (OUTPATIENT)
Facility: CLINIC | Age: 74
Discharge: HOME | End: 2024-01-04
Attending: STUDENT IN AN ORGANIZED HEALTH CARE EDUCATION/TRAINING PROGRAM | Admitting: STUDENT IN AN ORGANIZED HEALTH CARE EDUCATION/TRAINING PROGRAM
Payer: COMMERCIAL

## 2024-01-04 ENCOUNTER — ANESTHESIA EVENT (OUTPATIENT)
Dept: SURGERY | Facility: CLINIC | Age: 74
End: 2024-01-04
Payer: COMMERCIAL

## 2024-01-04 ENCOUNTER — ANESTHESIA (OUTPATIENT)
Dept: SURGERY | Facility: CLINIC | Age: 74
End: 2024-01-04
Payer: COMMERCIAL

## 2024-01-04 VITALS
RESPIRATION RATE: 16 BRPM | SYSTOLIC BLOOD PRESSURE: 115 MMHG | HEART RATE: 59 BPM | OXYGEN SATURATION: 97 % | TEMPERATURE: 97.6 F | DIASTOLIC BLOOD PRESSURE: 70 MMHG

## 2024-01-04 LAB — GLUCOSE BLDC GLUCOMTR-MCNC: 212 MG/DL (ref 70–99)

## 2024-01-04 PROCEDURE — V2632 POST CHMBR INTRAOCULAR LENS: HCPCS | Performed by: STUDENT IN AN ORGANIZED HEALTH CARE EDUCATION/TRAINING PROGRAM

## 2024-01-04 PROCEDURE — 761N000008 HC RECOVERY CATRACT PACKAGE: Performed by: STUDENT IN AN ORGANIZED HEALTH CARE EDUCATION/TRAINING PROGRAM

## 2024-01-04 PROCEDURE — 250N000009 HC RX 250: Performed by: STUDENT IN AN ORGANIZED HEALTH CARE EDUCATION/TRAINING PROGRAM

## 2024-01-04 PROCEDURE — 360N000007 HC CATARACT SURGICAL PACKAGE: Performed by: STUDENT IN AN ORGANIZED HEALTH CARE EDUCATION/TRAINING PROGRAM

## 2024-01-04 PROCEDURE — 370N000004 HC ANESTHESIA CATARACT PACKAGE: Performed by: STUDENT IN AN ORGANIZED HEALTH CARE EDUCATION/TRAINING PROGRAM

## 2024-01-04 PROCEDURE — 82962 GLUCOSE BLOOD TEST: CPT

## 2024-01-04 PROCEDURE — 250N000011 HC RX IP 250 OP 636: Performed by: NURSE ANESTHETIST, CERTIFIED REGISTERED

## 2024-01-04 PROCEDURE — 250N000011 HC RX IP 250 OP 636: Performed by: STUDENT IN AN ORGANIZED HEALTH CARE EDUCATION/TRAINING PROGRAM

## 2024-01-04 DEVICE — EYE IMP IOL AMO PCL TECNIS ZCB00 21.0: Type: IMPLANTABLE DEVICE | Site: EYE | Status: FUNCTIONAL

## 2024-01-04 RX ORDER — LIDOCAINE 40 MG/G
CREAM TOPICAL
Status: DISCONTINUED | OUTPATIENT
Start: 2024-01-04 | End: 2024-01-04 | Stop reason: HOSPADM

## 2024-01-04 RX ORDER — DICLOFENAC SODIUM 1 MG/ML
1 SOLUTION/ DROPS OPHTHALMIC
Status: COMPLETED | OUTPATIENT
Start: 2024-01-04 | End: 2024-01-04

## 2024-01-04 RX ORDER — ONDANSETRON 2 MG/ML
4 INJECTION INTRAMUSCULAR; INTRAVENOUS EVERY 30 MIN PRN
Status: CANCELLED | OUTPATIENT
Start: 2024-01-04

## 2024-01-04 RX ORDER — PREDNISOLONE ACETATE 10 MG/ML
SUSPENSION/ DROPS OPHTHALMIC
COMMUNITY
Start: 2023-11-22 | End: 2024-05-14

## 2024-01-04 RX ORDER — MOXIFLOXACIN 5 MG/ML
SOLUTION/ DROPS OPHTHALMIC
COMMUNITY
Start: 2023-11-22 | End: 2024-05-14

## 2024-01-04 RX ORDER — PREDNISOLONE ACETATE 1 %
SUSPENSION, DROPS(FINAL DOSAGE FORM)(ML) OPHTHALMIC (EYE) PRN
Status: DISCONTINUED | OUTPATIENT
Start: 2024-01-04 | End: 2024-01-04 | Stop reason: HOSPADM

## 2024-01-04 RX ORDER — KETOROLAC TROMETHAMINE 5 MG/ML
SOLUTION OPHTHALMIC
COMMUNITY
Start: 2023-11-22 | End: 2024-05-14

## 2024-01-04 RX ORDER — PHENYLEPHRINE HYDROCHLORIDE 25 MG/ML
1 SOLUTION/ DROPS OPHTHALMIC
Status: COMPLETED | OUTPATIENT
Start: 2024-01-04 | End: 2024-01-04

## 2024-01-04 RX ORDER — MOXIFLOXACIN 5 MG/ML
1 SOLUTION/ DROPS OPHTHALMIC
Status: COMPLETED | OUTPATIENT
Start: 2024-01-04 | End: 2024-01-04

## 2024-01-04 RX ORDER — PROPARACAINE HYDROCHLORIDE 5 MG/ML
1 SOLUTION/ DROPS OPHTHALMIC ONCE
Status: COMPLETED | OUTPATIENT
Start: 2024-01-04 | End: 2024-01-04

## 2024-01-04 RX ORDER — CYCLOPENTOLATE HYDROCHLORIDE 10 MG/ML
1 SOLUTION/ DROPS OPHTHALMIC
Status: COMPLETED | OUTPATIENT
Start: 2024-01-04 | End: 2024-01-04

## 2024-01-04 RX ORDER — ONDANSETRON 4 MG/1
4 TABLET, ORALLY DISINTEGRATING ORAL EVERY 30 MIN PRN
Status: CANCELLED | OUTPATIENT
Start: 2024-01-04

## 2024-01-04 RX ORDER — PROPARACAINE HYDROCHLORIDE 5 MG/ML
1 SOLUTION/ DROPS OPHTHALMIC ONCE
Status: DISCONTINUED | OUTPATIENT
Start: 2024-01-04 | End: 2024-01-04 | Stop reason: HOSPADM

## 2024-01-04 RX ADMIN — MIDAZOLAM 1 MG: 1 INJECTION INTRAMUSCULAR; INTRAVENOUS at 13:02

## 2024-01-04 RX ADMIN — DICLOFENAC SODIUM 1 DROP: 1 SOLUTION/ DROPS OPHTHALMIC at 12:11

## 2024-01-04 RX ADMIN — MIDAZOLAM 1 MG: 1 INJECTION INTRAMUSCULAR; INTRAVENOUS at 13:05

## 2024-01-04 RX ADMIN — MOXIFLOXACIN 1 DROP: 5 SOLUTION/ DROPS OPHTHALMIC at 12:11

## 2024-01-04 RX ADMIN — DICLOFENAC SODIUM 1 DROP: 1 SOLUTION/ DROPS OPHTHALMIC at 12:23

## 2024-01-04 RX ADMIN — MOXIFLOXACIN 1 DROP: 5 SOLUTION/ DROPS OPHTHALMIC at 12:23

## 2024-01-04 RX ADMIN — MOXIFLOXACIN 1 DROP: 5 SOLUTION/ DROPS OPHTHALMIC at 12:18

## 2024-01-04 RX ADMIN — CYCLOPENTOLATE HYDROCHLORIDE 1 DROP: 10 SOLUTION/ DROPS OPHTHALMIC at 12:11

## 2024-01-04 RX ADMIN — CYCLOPENTOLATE HYDROCHLORIDE 1 DROP: 10 SOLUTION/ DROPS OPHTHALMIC at 12:23

## 2024-01-04 RX ADMIN — PHENYLEPHRINE HYDROCHLORIDE 1 DROP: 25 SOLUTION/ DROPS OPHTHALMIC at 12:11

## 2024-01-04 RX ADMIN — DICLOFENAC SODIUM 1 DROP: 1 SOLUTION/ DROPS OPHTHALMIC at 12:18

## 2024-01-04 RX ADMIN — PHENYLEPHRINE HYDROCHLORIDE 1 DROP: 25 SOLUTION/ DROPS OPHTHALMIC at 12:18

## 2024-01-04 RX ADMIN — PHENYLEPHRINE HYDROCHLORIDE 1 DROP: 25 SOLUTION/ DROPS OPHTHALMIC at 12:23

## 2024-01-04 RX ADMIN — CYCLOPENTOLATE HYDROCHLORIDE 1 DROP: 10 SOLUTION/ DROPS OPHTHALMIC at 12:18

## 2024-01-04 RX ADMIN — PROPARACAINE HYDROCHLORIDE 1 DROP: 5 SOLUTION/ DROPS OPHTHALMIC at 12:11

## 2024-01-04 ASSESSMENT — ENCOUNTER SYMPTOMS: DYSRHYTHMIAS: 1

## 2024-01-04 ASSESSMENT — LIFESTYLE VARIABLES: TOBACCO_USE: 1

## 2024-01-04 ASSESSMENT — ACTIVITIES OF DAILY LIVING (ADL): ADLS_ACUITY_SCORE: 29

## 2024-01-04 NOTE — ANESTHESIA CARE TRANSFER NOTE
Patient: Genaro Barbosa    Procedure: Procedure(s):  Phacoemulsification with standard intraocular lens implant left       Diagnosis: Cataract [H26.9]  Diagnosis Additional Information: No value filed.    Anesthesia Type:   MAC     Note:    Oropharynx: spontaneously breathing  Level of Consciousness: awake  Oxygen Supplementation: room air    Independent Airway: airway patency satisfactory and stable  Dentition: dentition unchanged  Vital Signs Stable: post-procedure vital signs reviewed and stable  Report to RN Given: handoff report given  Patient transferred to: Phase II    Handoff Report: Identifed the Patient, Identified the Reponsible Provider, Reviewed the pertinent medical history, Discussed the surgical course, Reviewed Intra-OP anesthesia mangement and issues during anesthesia, Set expectations for post-procedure period and Allowed opportunity for questions and acknowledgement of understanding      Vitals:  Vitals Value Taken Time   /70 01/04/24 1341   Temp     Pulse 59 01/04/24 1341   Resp 16 01/04/24 1341   SpO2 97 % 01/04/24 1344   Vitals shown include unfiled device data.    Electronically Signed By: RACIEL Rogers CRNA  January 4, 2024  1:45 PM

## 2024-01-04 NOTE — ANESTHESIA POSTPROCEDURE EVALUATION
Patient: Genaro Barbosa    Procedure: Procedure(s):  Phacoemulsification with standard intraocular lens implant left       Anesthesia Type:  MAC    Note:  Disposition: Outpatient   Postop Pain Control: Uneventful            Sign Out: Well controlled pain   PONV: No   Neuro/Psych: Uneventful            Sign Out: Acceptable/Baseline neuro status   Airway/Respiratory: Uneventful            Sign Out: Acceptable/Baseline resp. status   CV/Hemodynamics: Uneventful            Sign Out: Acceptable CV status; No obvious hypovolemia; No obvious fluid overload   Other NRE: NONE   DID A NON-ROUTINE EVENT OCCUR? No           Last vitals:  Vitals:    01/04/24 1151 01/04/24 1337 01/04/24 1341   BP: 125/70 108/67 115/70   Pulse: 58 62 59   Resp: 16 15 16   Temp: 97.6  F (36.4  C)     SpO2: 99% 97% 97%       Electronically Signed By: RACIEL Rogers CRNA  January 4, 2024  1:57 PM

## 2024-01-04 NOTE — DISCHARGE INSTRUCTIONS
POST CATARACT SURGERY EYE INSTRUCTIONS             Eye Medications    VIGAMOX/OFLOXACIN (Tan Cap)    KETOROLAC (Wagner Cap)    PREDNISOLONE (Pink or White Cap)    If you opted for the individual bottles of eye medications follow these instructions.    *Instill one drop from each bottle into the operative eye 3 times a day until each  bottle is empty.    *Wait 5 minutes between each drop.        Wear eye shield while sleeping for 3 days    Refrain from heavy lifting, bending, straining, or strenuous activity for 1 week.    Do not rub or push on the operative eye for 1 week (you may wipe the eye lid(s) gently with a wet wash cloth to remove matter from eyelashes).    You may bathe or shower, but do not get the eye wet for 1 month (swimming, hot tubs or other water activities).    Minor itching, scratching sensation, burning sensation, etc. is normal.  Report any severe eye pain or loss of vision.      Bring all material and medications to the office on the first post op day.     Call your surgeon at [phone] or the answering service at [phone] for any problems, questions or concerns, especially pain.

## 2024-01-04 NOTE — ANESTHESIA PREPROCEDURE EVALUATION
Anesthesia Pre-Procedure Evaluation    Patient: Genaro Barbosa   MRN: [id number] : 1950        Procedure : Procedure(s):  Phacoemulsification with standard intraocular lens implant          Past Medical History:   Diagnosis Date    Coronary artery disease     stents x8    Diabetic eye exam (H) 10/11/12    [facility]    Fatty liver     Gallstones 11    hospitalized with RUQ pain    History of tobacco use:  - , /2 ppd 10/3/2011    Hyperlipidemia LDL goal < 100     Hypertension goal BP (blood pressure) < 130/80     Pyloric stenosis, congenital (H28)     Type 2 diabetes, HbA1c goal < 7% (H)       Past Surgical History:   Procedure Laterality Date    CARDIAC SURGERY      Angioplasty with stenting    HC PTA EXTREMITY ARTERY, EACH ADDITIONAL      PHACOEMULSIFICATION WITH STANDARD INTRAOCULAR LENS IMPLANT Right 2023    Procedure: Phacoemulsification with standard intraocular lens implant, Right;  Surgeon: Bne Gonzalez MD;  Location: [facility] OR    pyloric stenosis        Allergies   Allergen Reactions    Ace Inhibitors Cough    Contrast Dye Other (See Comments)     Patient felt like ants were crawling all over him/ per patient's explaination 12-15-16/tw      Social History     Tobacco Use    Smoking status: Former    Smokeless tobacco: Never    Tobacco comments:     quit age 26   Substance Use Topics    Alcohol use: Yes     Comment: approximately 12 beers per week      Wt Readings from Last 1 Encounters:   23 86.8 kg (191 lb 6.4 oz)        Anesthesia Evaluation   Pt has had prior anesthetic. Type: General.        ROS/MED HX  ENT/Pulmonary:     (+)                tobacco use, Past use,                       Neurologic:     (+)      migraines,                          Cardiovascular: Comment: Stents placed in , , , , ,       (+)  hypertension- Peripheral Vascular Disease (<50% bilaterally in 2019)-- Carotid Stenosis.  CAD -  - stent-                         dysrhythmias, a-fib,        Previous cardiac testing   Echo: Date: Results:    Stress Test:  Date: Results:    ECG Reviewed:  Date: 11/5/23 Results:  - NSR  Cath:  Date: Results:      METS/Exercise Tolerance:     Hematologic:       Musculoskeletal:  - neg musculoskeletal ROS     GI/Hepatic:     (+) GERD, Asymptomatic on medication,           liver disease,       Renal/Genitourinary:     (+) renal disease, type: CRI,            Endo:     (+)  type II DM, Last HgA1c: 10.3, date: 11/28/23, Using insulin, - not using insulin pump.                Psychiatric/Substance Use:  - neg psychiatric ROS     Infectious Disease:  - neg infectious disease ROS     Malignancy:  - neg malignancy ROS     Other:  - neg other ROS          Physical Exam    Airway        Mallampati: II   TM distance: > 3 FB   Neck ROM: full   Mouth opening: > 3 cm    Respiratory Devices and Support         Dental       (+) Completely normal teeth      Cardiovascular   cardiovascular exam normal          Pulmonary   pulmonary exam normal                OUTSIDE LABS:  CBC:   Lab Results   Component Value Date    WBC 8.1 11/28/2023    WBC 8.3 11/08/2023    HGB 14.3 11/28/2023    HGB 12.3 (L) 11/08/2023    HCT 42.8 11/28/2023    HCT 36.5 (L) 11/08/2023     11/28/2023     11/08/2023     BMP:   Lab Results   Component Value Date     11/28/2023     11/20/2023    POTASSIUM 5.0 11/28/2023    POTASSIUM 4.7 11/20/2023    CHLORIDE 101 11/28/2023    CHLORIDE 100 11/20/2023    CO2 24 11/28/2023    CO2 21 (L) 11/20/2023    BUN 21.8 11/28/2023    BUN 15.5 11/20/2023    CR 1.01 11/28/2023    CR 1.03 11/20/2023     (H) 12/21/2023     (H) 11/28/2023     COAGS:   Lab Results   Component Value Date    PTT 27 05/22/2012    INR 0.90 05/22/2012     POC:   Lab Results   Component Value Date     (H) 02/03/2011     HEPATIC:   Lab Results   Component Value Date    ALBUMIN 3.9 11/05/2023    PROTTOTAL 7.2 11/05/2023    ALT 19 11/05/2023     AST 21 11/05/2023    ALKPHOS 73 11/05/2023    BILITOTAL 1.6 (H) 11/05/2023     OTHER:   Lab Results   Component Value Date    LACT 1.0 11/06/2023    A1C 10.3 (H) 11/28/2023    TAYLOR 9.5 11/28/2023    LIPASE 109 03/09/2012    AMYLASE 70 03/09/2012    TSH 2.35 10/30/2019    CRP <5.0 03/06/2012    SED 14 03/09/2012       Anesthesia Plan    ASA Status:  3    NPO Status:  NPO Appropriate    Anesthesia Type: MAC.     - Reason for MAC: straight local not clinically adequate   Induction: Intravenous.           Consents    Anesthesia Plan(s) and associated risks, benefits, and realistic alternatives discussed. Questions answered and patient/representative(s) expressed understanding.     - Discussed: Risks, Benefits and Alternatives for BOTH SEDATION and the PROCEDURE were discussed     - Discussed with:  Patient      - Extended Intubation/Ventilatory Support Discussed: No.      - Patient is DNR/DNI Status: No     Use of blood products discussed: No .     Postoperative Care            Comments:    Other Comments: The risks and benefits of anesthesia, and the alternatives where applicable, have been discussed with the patient, and they wish to proceed.              RACIEL Rogers CRNA    I have reviewed the pertinent notes and labs in the chart from the past 30 days and (re)examined the patient.  Any updates or changes from those notes are reflected in this note.              # DMII: A1C = 10.3 % (Ref range: 0.0 - 5.6 %) within past 6 months

## 2024-01-04 NOTE — BRIEF OP NOTE
[facility]    Brief Operative Note    Pre-operative diagnosis: Cataract, Left Eye  Post-operative diagnosis Same as pre-operative diagnosis    Procedure: Phacoemulsification with standard intraocular lens implant left, Left - Eye    Surgeon: Surgeon(s) and Role:     * Sharif Siu MD - Primary  Anesthesia: MAC with Topical   Estimated Blood Loss: None    Drains: None  Specimens: * No specimens in log *  Findings:   None.  Complications: None.  Implants:   Implant Name Type Inv. Item Serial No.  Lot No. LRB No. Used Action   EYE IMP IOL MARLEN PCL TECNIS ZCB00 21.0 - [id number] Lens/Eye Implant EYE IMP IOL MARLEN PCL TECNIS ZCB00 21.0 [id number] [employer]  Left 1 Implanted

## 2024-01-04 NOTE — OP NOTE
[facility]  Ophthalmology Operative Note    PREOPERATIVE DIAGNOSIS: Cataract, Left eye.     POSTOPERATIVE DIAGNOSIS: Cataract, Left eye.     OPERATION: Cataract extraction with placement of posterior chamber intraocular lens in the Left eye.     ANESTHESIA: MAC combined with topical     INDICATIONS FOR PROCEDURE: Genaro Barbsoa was seen in the [facility] for decreased visual acuity in the Left eye. The patient was found to have a visually significant cataract in the Left eye. The risks, benefits, alternatives and goals of cataract extraction were discussed with the patient, and after adequate discussion the patient understood and agreed to these, and a signed informed consent was obtained prior to the procedure.     DESCRIPTION OF PROCEDURE: After proper patient identification, topical anesthesia was applied to the Left eye. The patient was brought to the operating room and the Left eye was prepped and draped in the usual sterile fashion for intraocular surgery. A lid speculum was placed in the Left eye. A paracentesis was then created and the anterior chamber was filled with 1% non-preserved lidocaine followed by Endocoat. A clear corneal incision was then created temporally using a 2.4mm keratome. A continuous curvilinear capsulorrhexis was then created using a cystotome and Utrata forceps. Hydrodissection was carried out with BSS on a Castro cannula and the lens rotated freely within the capsular bag. Phacoemulsification was then carried out using the divide and conquer technique. Residual cortical material was removed using the I&A handpiece. The capsular bag was then filled with Healon and a ZCB00 +21.0 diopter intraocular lens was then injected into the capsular bag. The lens showed good centration and stability. Residual viscoelastic was removed using the I&A handpiece. The wound was then hydrated and the anterior chamber reformed. Intracameral Moxifloxacin was  then injected into the anterior chamber. The wounds were then checked and found to be sealed. Topical Prednisolone drops were placed in the patient's Left eye followed by a Avina shield over the top of this. The patient tolerated the procedure well without complications and was told to follow up in the clinic in the next postoperative day.     Implant Name Type Inv. Item Serial No.  Lot No. LRB No. Used Action   EYE IMP IOL MARLEN PCL TECNIS ZCB00 21.0 - [id number] Lens/Eye Implant EYE IMP IOL MARLEN PCL TECNIS ZCB00 21.0 [id number] [employer]  Left 1 Implanted        Sharif Siu MD

## 2024-01-14 DIAGNOSIS — K21.9 GASTROESOPHAGEAL REFLUX DISEASE WITHOUT ESOPHAGITIS: ICD-10-CM

## 2024-02-27 ENCOUNTER — TELEPHONE (OUTPATIENT)
Dept: FAMILY MEDICINE | Facility: OTHER | Age: 74
End: 2024-02-27
Payer: COMMERCIAL

## 2024-02-27 NOTE — TELEPHONE ENCOUNTER
Patient Quality Outreach    Patient is due for the following:   Diabetes -  Eye Exam and Diabetic Follow-Up Visit    Next Steps:   Schedule a office visit for Diabetes recheck in March.    Type of outreach:    Sent [employer] message.      Questions for provider review:    None           Angela Espino, CMA

## 2024-03-01 ENCOUNTER — TRANSFERRED RECORDS (OUTPATIENT)
Dept: MULTI SPECIALTY CLINIC | Facility: CLINIC | Age: 74
End: 2024-03-01

## 2024-03-01 LAB — RETINOPATHY: NORMAL

## 2024-04-03 DIAGNOSIS — E11.59 TYPE 2 DIABETES MELLITUS WITH OTHER CIRCULATORY COMPLICATION, WITHOUT LONG-TERM CURRENT USE OF INSULIN (H): ICD-10-CM

## 2024-04-03 RX ORDER — GLIPIZIDE 10 MG/1
10 TABLET ORAL
Qty: 60 TABLET | Refills: 0 | Status: SHIPPED | OUTPATIENT
Start: 2024-04-03 | End: 2024-05-07

## 2024-05-04 DIAGNOSIS — K21.9 GASTROESOPHAGEAL REFLUX DISEASE WITHOUT ESOPHAGITIS: ICD-10-CM

## 2024-05-06 DIAGNOSIS — I25.10 CORONARY ARTERY DISEASE INVOLVING NATIVE CORONARY ARTERY OF NATIVE HEART WITHOUT ANGINA PECTORIS: ICD-10-CM

## 2024-05-06 DIAGNOSIS — E11.59 TYPE 2 DIABETES MELLITUS WITH OTHER CIRCULATORY COMPLICATION, WITHOUT LONG-TERM CURRENT USE OF INSULIN (H): ICD-10-CM

## 2024-05-06 DIAGNOSIS — E78.5 HYPERLIPIDEMIA LDL GOAL <70: ICD-10-CM

## 2024-05-06 RX ORDER — GLIPIZIDE 10 MG/1
10 TABLET ORAL
Qty: 200 TABLET | Refills: 0 | OUTPATIENT
Start: 2024-05-06

## 2024-05-06 NOTE — TELEPHONE ENCOUNTER
Patient is overdue for office visit. Nikki period has been provided and patient did not schedule. Please help patient schedule appointment and I can fill medication to get them to that date.     Thanks,  Rigo Rivas PA-C on 5/6/2024 at 3:24 PM

## 2024-05-06 NOTE — TELEPHONE ENCOUNTER
Patient has [facility] coverage and eligible to get certain prescriptions as a 100-day supplies.      Prescriptions updated to 100-day supply: metformin, glipizide, empagliflozin- pended to provider due to no refills remaining on original prescriptions     Note: rosuvastatin can also be sent as a 100 day fill but unclear if patient still taking as no recent dispenses and prescription is . Please also send as 100 day supply if patient should still be taking.      Elsa Nj, PharmD, [facility]  Retail Pharmacy Specialist  [phone]

## 2024-05-07 RX ORDER — GLIPIZIDE 10 MG/1
10 TABLET ORAL
Qty: 60 TABLET | Refills: 0 | Status: SHIPPED | OUTPATIENT
Start: 2024-05-07 | End: 2024-05-14

## 2024-05-14 ENCOUNTER — OFFICE VISIT (OUTPATIENT)
Dept: FAMILY MEDICINE | Facility: OTHER | Age: 74
End: 2024-05-14
Payer: COMMERCIAL

## 2024-05-14 VITALS
WEIGHT: 189.5 LBS | TEMPERATURE: 97.3 F | RESPIRATION RATE: 14 BRPM | OXYGEN SATURATION: 98 % | DIASTOLIC BLOOD PRESSURE: 70 MMHG | BODY MASS INDEX: 25.67 KG/M2 | HEART RATE: 62 BPM | SYSTOLIC BLOOD PRESSURE: 118 MMHG | HEIGHT: 72 IN

## 2024-05-14 DIAGNOSIS — E11.29 MICROALBUMINURIA DUE TO TYPE 2 DIABETES MELLITUS (H): ICD-10-CM

## 2024-05-14 DIAGNOSIS — E78.5 HYPERLIPIDEMIA LDL GOAL <70: ICD-10-CM

## 2024-05-14 DIAGNOSIS — I25.10 CORONARY ARTERY DISEASE INVOLVING NATIVE CORONARY ARTERY OF NATIVE HEART WITHOUT ANGINA PECTORIS: ICD-10-CM

## 2024-05-14 DIAGNOSIS — I48.91 ATRIAL FIBRILLATION, UNSPECIFIED TYPE (H): ICD-10-CM

## 2024-05-14 DIAGNOSIS — I73.9 PERIPHERAL VASCULAR DISEASE, UNSPECIFIED (H): ICD-10-CM

## 2024-05-14 DIAGNOSIS — E11.59 TYPE 2 DIABETES MELLITUS WITH OTHER CIRCULATORY COMPLICATION, WITHOUT LONG-TERM CURRENT USE OF INSULIN (H): ICD-10-CM

## 2024-05-14 DIAGNOSIS — I10 HTN, GOAL BELOW 140/90: ICD-10-CM

## 2024-05-14 DIAGNOSIS — N18.31 CHRONIC KIDNEY DISEASE, STAGE 3A (H): ICD-10-CM

## 2024-05-14 DIAGNOSIS — E11.59 TYPE 2 DIABETES MELLITUS WITH OTHER CIRCULATORY COMPLICATION, WITHOUT LONG-TERM CURRENT USE OF INSULIN (H): Primary | ICD-10-CM

## 2024-05-14 DIAGNOSIS — E11.9 TYPE 2 DIABETES, HBA1C GOAL < 7% (H): ICD-10-CM

## 2024-05-14 DIAGNOSIS — R80.9 MICROALBUMINURIA DUE TO TYPE 2 DIABETES MELLITUS (H): ICD-10-CM

## 2024-05-14 LAB — HBA1C MFR BLD: 9.7 % (ref 0–5.6)

## 2024-05-14 PROCEDURE — 83036 HEMOGLOBIN GLYCOSYLATED A1C: CPT | Performed by: PHYSICIAN ASSISTANT

## 2024-05-14 PROCEDURE — 36415 COLL VENOUS BLD VENIPUNCTURE: CPT | Performed by: PHYSICIAN ASSISTANT

## 2024-05-14 PROCEDURE — 99214 OFFICE O/P EST MOD 30 MIN: CPT | Performed by: PHYSICIAN ASSISTANT

## 2024-05-14 RX ORDER — RESPIRATORY SYNCYTIAL VIRUS VACCINE 120MCG/0.5
0.5 KIT INTRAMUSCULAR ONCE
Qty: 1 EACH | Refills: 0 | Status: CANCELLED | OUTPATIENT
Start: 2024-05-14 | End: 2024-05-14

## 2024-05-14 RX ORDER — GLIPIZIDE 10 MG/1
10 TABLET ORAL
Qty: 60 TABLET | Refills: 0 | Status: CANCELLED | OUTPATIENT
Start: 2024-05-14

## 2024-05-14 RX ORDER — METOPROLOL TARTRATE 50 MG
50 TABLET ORAL 2 TIMES DAILY
Qty: 180 TABLET | Refills: 3 | Status: CANCELLED | OUTPATIENT
Start: 2024-05-14

## 2024-05-14 RX ORDER — METFORMIN HCL 500 MG
1000 TABLET, EXTENDED RELEASE 24 HR ORAL 2 TIMES DAILY WITH MEALS
Qty: 360 TABLET | Refills: 0 | Status: SHIPPED | OUTPATIENT
Start: 2024-05-14 | End: 2024-08-27

## 2024-05-14 RX ORDER — ROSUVASTATIN CALCIUM 40 MG/1
40 TABLET, COATED ORAL DAILY
Qty: 90 TABLET | Refills: 3 | Status: SHIPPED | OUTPATIENT
Start: 2024-05-14

## 2024-05-14 RX ORDER — GLIPIZIDE 10 MG/1
10 TABLET ORAL
Qty: 180 TABLET | Refills: 0 | Status: SHIPPED | OUTPATIENT
Start: 2024-05-14 | End: 2024-08-27

## 2024-05-14 NOTE — PROGRESS NOTES
Assessment & Plan     Type 2 diabetes mellitus with other circulatory complication, without long-term current use of insulin (H)  Patient had been found to have elevated A1c this past fall 2023, 10.3%. He was referred to [facility] and did meet with them once, but chose not to follow up. He has been using 10units insulin since as he did not receive the intended guidance on titration. I will start him on titration today as the A1c has remained elevated at 9.7%. He will follow up in 3 months to re-evaluate sugars.   - Hemoglobin A1c; Future  - Hemoglobin A1c    Microalbuminuria due to type 2 diabetes mellitus (H)  Chronic kidney disease, stage 3a (H)  Last checked in Aug 2023. Mildly elevated. Patient is on jardiance, will continue to monitor albumin at future visits.     HTN, goal below 140/90  BP is well controlled at 118/70 today. No changes recommended at this time.     Hyperlipidemia LDL goal <70  The 10-year ASCVD risk score (Gena FELIX, et al., 2019) is: 42%    Values used to calculate the score:      Age: 74 years      Sex: Male      Is Non- : No      Diabetic: Yes      Tobacco smoker: No      Systolic Blood Pressure: 118 mmHg      Is BP treated: Yes      HDL Cholesterol: 57 mg/dL      Total Cholesterol: 173 mg/dL  Patient will continue the statin medication without change.     Coronary artery disease involving native coronary artery of native heart without angina pectoris  Atrial fibrillation, unspecified type (H)  Peripheral vascular disease, unspecified (H24)  Patient had recently met with cardiologist through the [facility]. Per patient's report no changes were recommended.       Jerome Lam is a 74 year old, presenting for the following health issues:  Diabetes      5/14/2024    10:35 AM   Additional Questions   Roomed by Cole SHEARER   Accompanied by self         5/14/2024    10:35 AM   Patient Reported Additional Medications   Patient reports taking the following  new medications Insulin     FYI the note will say there are incomplete questionnaires because he declined filling them out, also for his last eye exam he couldn't remember when it was just says that he had cataract surgery recently and it was before that a few months ago at [facility].    History of Present Illness       Diabetes:   He presents for follow up of diabetes.  He is checking home blood glucose one time daily.   He checks blood glucose before meals.  Blood glucose is never over 200 and never under 70.  When his blood glucose is low, the patient is asymptomatic for confusion, blurred vision, lethargy and reports not feeling dizzy, shaky, or weak.   He has no concerns regarding his diabetes at this time.   He is not experiencing numbness or burning in feet, excessive thirst, blurry vision, weight changes or redness, sores or blisters on feet. The patient has had a diabetic eye exam in the last 12 months. Eye exam performed on a few months ago. Location of last eye exam [facility].        He eats 0-1 servings of fruits and vegetables daily.He consumes 0 sweetened beverage(s) daily.   He is taking medications regularly.     Regarding labs: he said A1C was elevated last time, he wants that redrawn today and also wants to make sure he gets a phone call with results because he says he never does. 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      Review of Systems  Constitutional, HEENT, cardiovascular, pulmonary, gi and gu systems are negative, except as otherwise noted.      Objective    /70   Pulse 62   Temp 97.3  F (36.3  C) (Temporal)   Resp 14   Ht 1.829 m (6')   Wt 86 kg (189 lb 8 oz)   SpO2 98%   BMI 25.70 kg/m    Body mass index is 25.7 kg/m .  Physical Exam   GENERAL: alert and no distress  NECK: no adenopathy, no asymmetry, masses, or scars  RESP: lungs clear to auscultation - no rales, rhonchi or wheezes  CV: regular rate and  rhythm, normal S1 S2, no S3 or S4, no murmur, click or rub, no peripheral edema  MS: no gross musculoskeletal defects noted, no edema  PSYCH: mentation appears normal and anxious    Results for orders placed or performed in visit on 05/14/24   Hemoglobin A1c     Status: Abnormal   Result Value Ref Range    Hemoglobin A1C 9.7 (H) 0.0 - 5.6 %    Narrative    Results consistent with previous, repeat testing unnecessary            Signed Electronically by: Rigo Rivas PA-C

## 2024-07-01 ENCOUNTER — TELEPHONE (OUTPATIENT)
Dept: FAMILY MEDICINE | Facility: OTHER | Age: 74
End: 2024-07-01
Payer: COMMERCIAL

## 2024-07-01 NOTE — TELEPHONE ENCOUNTER
Attempted to reach Genaro Barbosa in regards to their rosuvastatin. Left voicemail, with callback number, requesting return call.     Per our records, rosuvastatin has not been filled yet this calendar year.      Elsa Nj, PharmD, [facility]arm[facility]   [phone]

## 2024-07-17 ENCOUNTER — TELEPHONE (OUTPATIENT)
Dept: PHARMACY | Facility: CLINIC | Age: 74
End: 2024-07-17
Payer: COMMERCIAL

## 2024-07-17 NOTE — TELEPHONE ENCOUNTER
This patient is due for MTM follow-up. I called the patient to schedule an appointment and left a message with the clinic phone number for the patient to call to schedule.    Homar Falcon, KeelyD, [facility]  Medication Therapy Management Pharmacist  Voicemail: [phone]

## 2024-07-23 ENCOUNTER — PATIENT OUTREACH (OUTPATIENT)
Dept: CARE COORDINATION | Facility: CLINIC | Age: 74
End: 2024-07-23
Payer: COMMERCIAL

## 2024-07-23 NOTE — PROGRESS NOTES
Clinical Product Navigator RN reviewed chart; patient on payer product coverage.  Review results:   CPN Initial Information Gathering  Referral Source: Health Plan     Obtaining further information to determine needs and next steps.  Patient identified by his health plan due to emerging risk of ED or inpatient admission. Per chart review, 74 yr old male with one admission in the past 12 months. PMHx of T2DM, coronary disease, hypertension, hyperlipidemia, AFib, CKD3, microalbuminuria d/t DM and CKD. Careteam: [facility] PCP, and MTM pharmacist. Met with MTM 1x and choose not to meet again. [facility]. Last PCP office visit 5/14/24. PCP office attempted to contact pt on 7/1/24 as records show rosuvastatin has not been filled yet this calendar year. MTM LVM 7/17/24 to [facility] follow up. Due for AWV and PCP follow up in August 2024 for HGB A1C re-check.    RN Clinical Product Navigator called and spoke with Genaro, Genaro states that he is doing well but does not want to talk long. RN Clinical Product Navigator briefly reviewed role and offered assistance in scheduling AWV/PCP follow up and MTM. Patient is agreeable to have CPN assist in scheduling PCP follow up, however he does not wish to follow up with MTM at this time. Genaro denies any on going care coordination needs and declines complete full CC assessment.     RN Clinical Product Navigator assisted patient in scheduling AWV with PCP for Tuesday 8/27/24 at 1:30pm.    Cherise Hitchcock RN   Clinical Product Navigator   [email or web address]   Office: [phone]

## 2024-07-25 ENCOUNTER — NURSE TRIAGE (OUTPATIENT)
Dept: FAMILY MEDICINE | Facility: OTHER | Age: 74
End: 2024-07-25
Payer: COMMERCIAL

## 2024-07-25 DIAGNOSIS — E11.9 TYPE 2 DIABETES, HBA1C GOAL < 7% (H): ICD-10-CM

## 2024-07-25 RX ORDER — INSULIN GLARGINE-YFGN 100 [IU]/ML
16 INJECTION, SOLUTION SUBCUTANEOUS AT BEDTIME
Qty: 6 ML | Refills: 0 | Status: SHIPPED | OUTPATIENT
Start: 2024-07-25 | End: 2024-08-27

## 2024-07-25 NOTE — TELEPHONE ENCOUNTER
Pt called the clinic stating he has been out of insulin for 1 week and is experiencing high than usual blood sugars. He declines any new symptoms.   HP refill request sent to team for review.   Pt will monitor himself at home per protocol.     Reason for Disposition   Blood glucose 240 - 300 mg/dL (13.3 - 16.7 mmol/L)    Additional Information   Negative: Unconscious or difficult to awaken   Negative: Acting confused (e.g., disoriented, slurred speech)   Negative: Very weak (can't stand)   Negative: Sounds like a life-threatening emergency to the triager   Negative: Vomiting and signs of dehydration (e.g., very dry mouth, lightheaded, dark urine)   Negative: Blood glucose > 240 mg/dL (13.3 mmol/L) and rapid breathing   Negative: Blood glucose > 500 mg/dL (27.8 mmol/L)   Negative: Blood glucose > 240 mg/dL (13.3 mmol/L) AND urine ketones moderate-large (or more than 1+)   Negative: Blood glucose > 240 mg/dL (13.3 mmol/L) and blood ketones > 1.4 mmol/L   Negative: Blood glucose > 240 mg/dL (13.3 mmol/L) AND vomiting AND unable to check for ketones (in blood or urine)   Negative: Vomiting lasting > 4 hours   Negative: Patient sounds very sick or weak to the triager   Negative: Fever > 100.4 F (38.0 C)   Negative: Caller has URGENT medication or insulin pump question and triager unable to answer question   Negative: Blood glucose > 400 mg/dL (22.2 mmol/L)   Negative: Blood glucose > 300 mg/dL (16.7 mmol/L) AND two or more times in a row   Negative: Urine ketones moderate - large (or blood ketones > 1.4 mmol/L)   Negative: Symptoms of high blood sugar (e.g., abnormally thirsty, frequent urination, weight loss) and not able to test blood glucose, AND pregnant   Negative: Blood glucose > 240 mg/dL (13.3 mmol/L) AND pregnant   Negative: Symptoms of high blood sugar (e.g., abnormally thirsty, frequent urination, weight loss) and not able to test blood glucose   Negative: New-onset diabetes mellitus suspected (e.g., frequent  "urination, weak, weight loss)   Negative: Patient wants to be seen   Negative: Caller has NON-URGENT medication question about med that PCP prescribed and triager unable to answer question   Negative: Blood glucose > 300 mg/dL (16.7 mmol/L)    Answer Assessment - Initial Assessment Questions  1. BLOOD GLUCOSE: \"What is your blood glucose level?\"       290   2. ONSET: \"When did you check the blood glucose?\"      Now  3. USUAL RANGE: \"What is your glucose level usually?\" (e.g., usual fasting morning value, usual evening value)      100s  4. KETONES: \"Do you check for ketones (urine or blood test strips)?\" If Yes, ask: \"What does the test show now?\"       No  5. TYPE 1 or 2:  \"Do you know what type of diabetes you have?\"  (e.g., Type 1, Type 2, Gestational; doesn't know)       Type 2  6. INSULIN: \"Do you take insulin?\" \"What type of insulin(s) do you use? What is the mode of delivery? (syringe, pen; injection or pump)?\"       Yes   7. DIABETES PILLS: \"Do you take any pills for your diabetes?\" If Yes, ask: \"Have you missed taking any pills recently?\"      Jardiance, insulin   8. OTHER SYMPTOMS: \"Do you have any symptoms?\" (e.g., fever, frequent urination, difficulty breathing, dizziness, weakness, vomiting)      Frequent urination - states this is not new  9. PREGNANCY: \"Is there any chance you are pregnant?\" \"When was your last menstrual period?\"      N/A    Protocols used: Diabetes - High Blood Sugar-A-OH  Thank you,  Anna Mccarthy, RN    "

## 2024-07-25 NOTE — TELEPHONE ENCOUNTER
Pt has been out of insulin for about 1 week.   Routing to refill team HP for review.     Pt does not need a call back.     Thank you,  Anna Mccarthy RN

## 2024-08-02 ENCOUNTER — APPOINTMENT (OUTPATIENT)
Dept: GENERAL RADIOLOGY | Facility: CLINIC | Age: 74
End: 2024-08-02
Attending: EMERGENCY MEDICINE
Payer: COMMERCIAL

## 2024-08-02 ENCOUNTER — HOSPITAL ENCOUNTER (EMERGENCY)
Facility: CLINIC | Age: 74
Discharge: HOME | End: 2024-08-02
Attending: EMERGENCY MEDICINE | Admitting: EMERGENCY MEDICINE
Payer: COMMERCIAL

## 2024-08-02 VITALS
OXYGEN SATURATION: 98 % | RESPIRATION RATE: 20 BRPM | DIASTOLIC BLOOD PRESSURE: 87 MMHG | WEIGHT: 191.5 LBS | HEART RATE: 58 BPM | BODY MASS INDEX: 25.97 KG/M2 | TEMPERATURE: 97.5 F | SYSTOLIC BLOOD PRESSURE: 123 MMHG

## 2024-08-02 DIAGNOSIS — S43.102A AC SEPARATION, LEFT, INITIAL ENCOUNTER: ICD-10-CM

## 2024-08-02 PROCEDURE — 73030 X-RAY EXAM OF SHOULDER: CPT | Mod: LT

## 2024-08-02 PROCEDURE — 99283 EMERGENCY DEPT VISIT LOW MDM: CPT | Performed by: EMERGENCY MEDICINE

## 2024-08-02 ASSESSMENT — ACTIVITIES OF DAILY LIVING (ADL)
ADLS_ACUITY_SCORE: 38
ADLS_ACUITY_SCORE: 36

## 2024-08-02 ASSESSMENT — COLUMBIA-SUICIDE SEVERITY RATING SCALE - C-SSRS
2. HAVE YOU ACTUALLY HAD ANY THOUGHTS OF KILLING YOURSELF IN THE PAST MONTH?: NO
6. HAVE YOU EVER DONE ANYTHING, STARTED TO DO ANYTHING, OR PREPARED TO DO ANYTHING TO END YOUR LIFE?: NO
1. IN THE PAST MONTH, HAVE YOU WISHED YOU WERE DEAD OR WISHED YOU COULD GO TO SLEEP AND NOT WAKE UP?: NO
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
## 2024-08-02 NOTE — DISCHARGE INSTRUCTIONS
Recommend some rest of the shoulder.  Recommend using the sling, but take out at least 3 times a day for some range of motion.  Tylenol up to 1000 mg 4 times a day for discomfort.  May use Voltaren gel as needed as well

## 2024-08-05 ENCOUNTER — PATIENT OUTREACH (OUTPATIENT)
Dept: FAMILY MEDICINE | Facility: OTHER | Age: 74
End: 2024-08-05
Payer: COMMERCIAL

## 2024-08-05 NOTE — TELEPHONE ENCOUNTER
Transitions of Care Outreach  Chief Complaint   Patient presents with    Hospital F/U       Most Recent Admission Date: 8/2/2024   Most Recent Admission Diagnosis:      Most Recent Discharge Date: 8/2/2024   Most Recent Discharge Diagnosis: AC separation, left, initial encounter - S43.102A     Transitions of Care Assessment    Discharge Assessment  How are you doing now that you are home?: still have pain  How are your symptoms? (Red Flag symptoms escalate to triage hotline per guidelines): Unchanged  Do you know how to contact your clinic care team if you have future questions or changes to your health status? : Yes  Does the patient have their discharge instructions? : Yes  Does the patient have questions regarding their discharge instructions? : No  Were you started on any new medications or were there changes to any of your previous medications? : Yes  Does the patient have all of their medications?: Yes  Do you have questions regarding any of your medications? : No    Follow up Plan     Discharge Follow-Up  Discharge follow up appointment scheduled in alignment with recommended follow up timeframe or Transitions of Risk Category? (Low = within 30 days; Moderate= within 14 days; High= within 7 days): Yes  Discharge Follow Up Appointment Date: 08/27/24  Discharge Follow Up Appointment Scheduled with?: Primary Care Provider    Future Appointments   Date Time Provider Department Center   8/27/2024  1:30 PM Rigo Rivas PA-C [facility]       Outpatient Plan as outlined on AVS reviewed with patient.    For any urgent concerns, please contact our 24 hour nurse triage line: [phone] ([phone])       Jaycee Greene RN

## 2024-08-24 DIAGNOSIS — I10 HTN, GOAL BELOW 140/90: ICD-10-CM

## 2024-08-26 RX ORDER — METOPROLOL TARTRATE 50 MG
50 TABLET ORAL 2 TIMES DAILY
Qty: 180 TABLET | Refills: 1 | Status: SHIPPED | OUTPATIENT
Start: 2024-08-26

## 2024-08-27 ENCOUNTER — OFFICE VISIT (OUTPATIENT)
Dept: FAMILY MEDICINE | Facility: OTHER | Age: 74
End: 2024-08-27
Payer: COMMERCIAL

## 2024-08-27 ENCOUNTER — TELEPHONE (OUTPATIENT)
Dept: FAMILY MEDICINE | Facility: OTHER | Age: 74
End: 2024-08-27

## 2024-08-27 VITALS
SYSTOLIC BLOOD PRESSURE: 122 MMHG | BODY MASS INDEX: 25.73 KG/M2 | DIASTOLIC BLOOD PRESSURE: 78 MMHG | HEIGHT: 72 IN | OXYGEN SATURATION: 97 % | RESPIRATION RATE: 18 BRPM | HEART RATE: 70 BPM | TEMPERATURE: 97.3 F | WEIGHT: 190 LBS

## 2024-08-27 DIAGNOSIS — Z29.11 NEED FOR VACCINATION AGAINST RESPIRATORY SYNCYTIAL VIRUS: ICD-10-CM

## 2024-08-27 DIAGNOSIS — Z23 NEED FOR TDAP VACCINATION: ICD-10-CM

## 2024-08-27 DIAGNOSIS — E11.9 TYPE 2 DIABETES, HBA1C GOAL < 7% (H): ICD-10-CM

## 2024-08-27 DIAGNOSIS — N18.31 CHRONIC KIDNEY DISEASE, STAGE 3A (H): ICD-10-CM

## 2024-08-27 DIAGNOSIS — E11.59 TYPE 2 DIABETES MELLITUS WITH OTHER CIRCULATORY COMPLICATION, WITHOUT LONG-TERM CURRENT USE OF INSULIN (H): Primary | ICD-10-CM

## 2024-08-27 DIAGNOSIS — I25.10 CORONARY ARTERY DISEASE INVOLVING NATIVE HEART WITHOUT ANGINA PECTORIS, UNSPECIFIED VESSEL OR LESION TYPE: ICD-10-CM

## 2024-08-27 DIAGNOSIS — K21.9 GASTROESOPHAGEAL REFLUX DISEASE WITHOUT ESOPHAGITIS: ICD-10-CM

## 2024-08-27 LAB
CHOLEST SERPL-MCNC: 262 MG/DL
CREAT UR-MCNC: 42.4 MG/DL
FASTING STATUS PATIENT QL REPORTED: NO
HBA1C MFR BLD: 11.1 % (ref 0–5.6)
HDLC SERPL-MCNC: 56 MG/DL
LDLC SERPL CALC-MCNC: 183 MG/DL
MICROALBUMIN UR-MCNC: 36.9 MG/L
MICROALBUMIN/CREAT UR: 87.03 MG/G CR (ref 0–17)
NONHDLC SERPL-MCNC: 206 MG/DL
TRIGL SERPL-MCNC: 115 MG/DL

## 2024-08-27 PROCEDURE — 83036 HEMOGLOBIN GLYCOSYLATED A1C: CPT | Performed by: PHYSICIAN ASSISTANT

## 2024-08-27 PROCEDURE — 36415 COLL VENOUS BLD VENIPUNCTURE: CPT | Performed by: PHYSICIAN ASSISTANT

## 2024-08-27 PROCEDURE — 99214 OFFICE O/P EST MOD 30 MIN: CPT | Performed by: PHYSICIAN ASSISTANT

## 2024-08-27 PROCEDURE — G2211 COMPLEX E/M VISIT ADD ON: HCPCS | Performed by: PHYSICIAN ASSISTANT

## 2024-08-27 PROCEDURE — 82570 ASSAY OF URINE CREATININE: CPT | Performed by: PHYSICIAN ASSISTANT

## 2024-08-27 PROCEDURE — 99207 PR FOOT EXAM NO CHARGE: CPT | Performed by: PHYSICIAN ASSISTANT

## 2024-08-27 PROCEDURE — 82043 UR ALBUMIN QUANTITATIVE: CPT | Performed by: PHYSICIAN ASSISTANT

## 2024-08-27 PROCEDURE — 80061 LIPID PANEL: CPT | Performed by: PHYSICIAN ASSISTANT

## 2024-08-27 RX ORDER — GLIPIZIDE 10 MG/1
10 TABLET ORAL
Qty: 180 TABLET | Refills: 0 | Status: SHIPPED | OUTPATIENT
Start: 2024-08-27

## 2024-08-27 RX ORDER — METFORMIN HCL 500 MG
1000 TABLET, EXTENDED RELEASE 24 HR ORAL 2 TIMES DAILY WITH MEALS
Qty: 360 TABLET | Refills: 0 | Status: SHIPPED | OUTPATIENT
Start: 2024-08-27

## 2024-08-27 RX ORDER — INSULIN GLARGINE-YFGN 100 [IU]/ML
INJECTION, SOLUTION SUBCUTANEOUS
Qty: 6 ML | Refills: 2 | Status: SHIPPED | OUTPATIENT
Start: 2024-08-27 | End: 2024-08-28

## 2024-08-27 NOTE — LETTER
August 28, 2024      Genaro Barbosa  [address] [address] [place] [address] [address] [place] [id number]        Dear ,    We are writing to inform you of your test results.    The hemoglobin A1c returned increased at 11.1%, the goal is to get your A1c to 7.0%. The persistently elevated hemoglobin A1c (blood sugar level) puts you at an increased risk for stroke, heart attack, kidney disease, eye disease, nerve pains and other serious health complications. As you were already instructed by my staff, I would like you to restart the metformin as this was clearly making a difference in reducing your blood sugar. I want you to continue the jardiance and glipizide as well.   I would like you to increase your insulin dose per the instructions outlined on the refill sent to the pharmacy for you. In addition, I have put in a referral for him to schedule with the endocrinologist. They are booking out 6 months so I would like him to schedule with them now for their earliest appointment. [email or web address] will call you to coordinate your care as prescribed by the provider.  If you don't hear from a representative within 2 business days, please call [phone].     Part of the reason your blood sugar levels are stay so high is due to the choices you are making in your diet. There are several foods and beverages which raise blood sugar. Things to avoid include:     Beverages with carbohydrates in them. You mentioned drinking 4 beers each day. This means you are drinking 28 beers a week. The current recommendation by the American Medical Association is to limit alcohol to 1 per day. I recommend that you follow this recommendation. I also recommend that you avoid consumption of soda, juice, koolaid, sugary sports drinks or adding sugar to coffee or tea.     I recommend that you avoid processed meats such as hot dogs, brats, processed lunch meats. In addition, avoidance of potatoes, french fries or other fried foods is advised as these will  raise blood sugar levels.     If you would like to learn more about how to adjust your diet I can help to you to return to the diabetic education service. I had recommended this service to you last year as medication will only do so much for lowering blood sugar. You have to make lifestyle modifications as well. If you would like to meet with them again please let me know.     Please schedule with endocrinology at their earliest appointment. Also, plan on following up with myself in 3 months to recheck the diabetes.     Resulted Orders   Lipid panel reflex to direct LDL Non-fasting   Result Value Ref Range    Cholesterol 262 (H) <200 mg/dL    Triglycerides 115 <150 mg/dL    Direct Measure HDL 56 >=40 mg/dL    LDL Cholesterol Calculated 183 (H) <=100 mg/dL    Non HDL Cholesterol 206 (H) <130 mg/dL    Patient Fasting > 8hrs? No     Narrative    Cholesterol  Desirable:  <200 mg/dL    Triglycerides  Normal:  Less than 150 mg/dL  Borderline High:  150-199 mg/dL  High:  200-499 mg/dL  Very High:  Greater than or equal to 500 mg/dL    Direct Measure HDL  Female:  Greater than or equal to 50 mg/dL   Male:  Greater than or equal to 40 mg/dL    LDL Cholesterol  Desirable:  <100mg/dL  Above Desirable:  100-129 mg/dL   Borderline High:  130-159 mg/dL   High:  160-189 mg/dL   Very High:  >= 190 mg/dL    Non HDL Cholesterol  Desirable:  130 mg/dL  Above Desirable:  130-159 mg/dL  Borderline High:  160-189 mg/dL  High:  190-219 mg/dL  Very High:  Greater than or equal to 220 mg/dL   Albumin Random Urine Quantitative with Creat Ratio   Result Value Ref Range    Creatinine Urine mg/dL 42.4 mg/dL      Comment:      The reference ranges have not been established in urine creatinine. The results should be integrated into the clinical context for interpretation.    Albumin Urine mg/L 36.9 mg/L      Comment:      The reference ranges have not been established in urine albumin. The results should be integrated into the clinical context  for interpretation.    Albumin Urine mg/g Cr 87.03 (H) 0.00 - 17.00 mg/g Cr      Comment:      Microalbuminuria is defined as an albumin:creatinine ratio of 17 to 299 for males and 25 to 299 for females. A ratio of albumin:creatinine of 300 or higher is indicative of overt proteinuria.  Due to biologic variability, positive results should be confirmed by a second, first-morning random or 24-hour timed urine specimen. If there is discrepancy, a third specimen is recommended. When 2 out of 3 results are in the microalbuminuria range, this is evidence for incipient nephropathy and warrants increased efforts at glucose control, blood pressure control, and institution of therapy with an angiotensin-converting-enzyme (ACE) inhibitor (if the patient can tolerate it).     Hemoglobin A1c   Result Value Ref Range    Hemoglobin A1C 11.1 (H) 0.0 - 5.6 %      Comment:      Normal <5.7%   Prediabetes 5.7-6.4%    Diabetes 6.5% or higher     Note: Adopted from ADA consensus guidelines.    Narrative    Results consistent with previous, repeat testing unnecessary         If you have any questions or concerns, please call the clinic at the number listed above.       Sincerely,      Rigo Rivas PA-C

## 2024-08-27 NOTE — PROGRESS NOTES
Assessment & Plan     Type 2 diabetes mellitus with other circulatory complication, without long-term current use of insulin (H)  Patient has poorly controlled diabetes. He and I are working on titrating insulin to help improve the A1c. Unfortunately, the patient ran out of his metformin 2 weeks ago, so I suspect that A1c will not have improved since previous check. He informs me that he does try to follow a low carb diet, but does consume 4 youngblood light beers/day and will occasionally eat hot dogs, baked beans, etc. I had tried to connect patient with the diabetic educator, but he chose not follow up with this service. Will adjust medication accordingly following return of labs.   - empagliflozin (JARDIANCE) 25 MG TABS tablet; Take 1 tablet (25 mg) by mouth daily.  - glipiZIDE (GLUCOTROL) 10 MG tablet; Take 1 tablet (10 mg) by mouth 2 times daily (before meals).  - FOOT EXAM  - Hemoglobin A1c; Future  - Hemoglobin A1c    Coronary artery disease involving native heart without angina pectoris, unspecified vessel or lesion type  Patient is following with cardiologist, most recent visit in April 2024. Review of this note shows that, from a cardiac standpoint, he was felt to be stable.   - Lipid panel reflex to direct LDL Non-fasting; Future  - Lipid panel reflex to direct LDL Non-fasting    Chronic kidney disease, stage 3a (H)  Patient will try to provide sample to measure albumin today. He is on jardiance which will help to manage renal function. He was educated on the importance of drinking water and limiting beer/soda. I will notify him when results return.   - Albumin Random Urine Quantitative with Creat Ratio; Future  - Albumin Random Urine Quantitative with Creat Ratio    Gastroesophageal reflux disease without esophagitis  Symptoms well controlled with prilosec. No changes recommended at this time. He did share that he consumes up to 4 beers/day. This certainly influences reflux symptoms. Discussed with  Action 1: Continue patient reducing the intake of the beers.   - omeprazole (PRILOSEC) 20 MG DR capsule; Take 1 capsule (20 mg) by mouth daily.    Need for Tdap vaccination  Need for vaccination against respiratory syncytial virus  Reviewed importance of vaccinations with the patient. He declined both at this time. I did point out that as a  it is important for him to maintain protection against pertussis, declined. Encouraged him to reach out if he changes his mind.           BMI  Estimated body mass index is 25.77 kg/m  as calculated from the following:    Height as of this encounter: 1.829 m (6').    Weight as of this encounter: 86.2 kg (190 lb).   Weight management plan: Discussed healthy diet and exercise guidelines      Jerome Lam is a 74 year old, presenting for the following health issues:  Diabetes        8/27/2024     1:05 PM   Additional Questions   Roomed by Angela SHEARER   Accompanied by self     History of Present Illness       Diabetes:   He presents for follow up of diabetes.  He is checking home blood glucose a few times a month.   He checks blood glucose before meals.  Blood glucose is sometimes over 200 and never under 70.  When his blood glucose is low, the patient is asymptomatic for confusion, blurred vision, lethargy and reports not feeling dizzy, shaky, or weak.   He has no concerns regarding his diabetes at this time.   He is not experiencing numbness or burning in feet, excessive thirst, blurry vision, weight changes or redness, sores or blisters on feet.           He eats 0-1 servings of fruits and vegetables daily.He consumes 0 sweetened beverage(s) daily.He exercises with enough effort to increase his heart rate 9 or less minutes per day.  He exercises with enough effort to increase his heart rate 3 or less days per week.   He is taking medications regularly.     He feels his meds are not doing anything for his blood sugar just FYI, so he wants to talk to you about these medications. Jardiance,  Continue Regimen: Lidex Solution 0.05% three nights a week and taper off gradually Metformin, and Glipizide.      Review of Systems  Constitutional, HEENT, cardiovascular, pulmonary, gi and gu systems are negative, except as otherwise noted.      Objective    /78   Pulse 70   Temp 97.3  F (36.3  C) (Temporal)   Resp 18   Ht 1.829 m (6')   Wt 86.2 kg (190 lb)   SpO2 97%   BMI 25.77 kg/m    Body mass index is 25.77 kg/m .  Physical Exam   GENERAL: alert and no distress  EYES: Eyes grossly normal to inspection, PERRL and conjunctivae and sclerae normal  HENT: ear canals and TM's normal, nose and mouth without ulcers or lesions  NECK: no adenopathy, no asymmetry, masses, or scars  RESP: lungs clear to auscultation - no rales, rhonchi or wheezes  CV: regular rate and rhythm, normal S1 S2, no S3 or S4, no murmur, click or rub, no peripheral edema  NEURO: Normal strength and tone, mentation intact and speech normal  PSYCH: mentation appears normal and anxious  Diabetic foot exam: normal DP and PT pulses, no trophic changes or ulcerative lesions, and normal sensory exam    No results found for any visits on 08/27/24.        Signed Electronically by: Rigo Rivas PA-C     Otc Regimen: Alternate Tsal and Tgel qd Detail Level: Simple

## 2024-08-27 NOTE — RESULT ENCOUNTER NOTE
Please call patient to inform him that his A1c returned significantly increased at 11.1%. I would like him to restart the metformin as this shows that the medication was providing benefit.     I would like him to increase insulin 2 units every 2 days to a maximum of 20 units at bedtime.     In addition, I have put in a referral for him to schedule with the endocrinologist. They are booking out 6 months so I would like him to schedule with them now for their earliest appointment. [facility] will call you to coordinate your care as prescribed by the provider.  If you don't hear from a representative within 2 business days, please call [phone].    He and I can plan on follow up in 3 months to recheck sugars with me.    Thanks,  Rigo Rivas PA-C on 8/27/2024 at 1:53 PM

## 2024-08-28 ENCOUNTER — TELEPHONE (OUTPATIENT)
Dept: FAMILY MEDICINE | Facility: OTHER | Age: 74
End: 2024-08-28
Payer: COMMERCIAL

## 2024-08-28 NOTE — TELEPHONE ENCOUNTER
Patient called in asking for his A1C results from yesterday 8/27/24. Writer read provider comments attached to A1C results. He was notified that refills for the insulin, jardiance, glipizide and metformin were sent to the pharmacy. He had no further questions.     Alexus Ozuna, GLORIAN, RN

## 2024-08-28 NOTE — RESULT ENCOUNTER NOTE
Please send letter with copy of labs:    Dear Genaro,      The hemoglobin A1c returned increased at 11.1%, the goal is to get your A1c to 7.0%. The persistently elevated hemoglobin A1c (blood sugar level) puts you at an increased risk for stroke, heart attack, kidney disease, eye disease, nerve pains and other serious health complications. As you were already instructed by my staff, I would like you to restart the metformin as this was clearly making a difference in reducing your blood sugar. I want you to continue the jardiance and glipizide as well.   I would like you to increase your insulin dose per the instructions outlined on the refill sent to the pharmacy for you. In addition, I have put in a referral for him to schedule with the endocrinologist. They are booking out 6 months so I would like him to schedule with them now for their earliest appointment. [facility] will call you to coordinate your care as prescribed by the provider.  If you don't hear from a representative within 2 business days, please call [phone].    Part of the reason your blood sugar levels are stay so high is due to the choices you are making in your diet. There are several foods and beverages which raise blood sugar. Things to avoid include:    Beverages with carbohydrates in them. You mentioned drinking 4 beers each day. This means you are drinking 28 beers a week. The current recommendation by the [employer] Association is to limit alcohol to 1 per day. I recommend that you follow this recommendation. I also recommend that you avoid consumption of soda, juice, koolaid, sugary sports drinks or adding sugar to coffee or tea.    I recommend that you avoid processed meats such as hot dogs, brats, processed lunch meats. In addition, avoidance of potatoes, french fries or other fried foods is advised as these will raise blood sugar levels.     If you would like to learn more about how to adjust your diet I can help to you to  return to the diabetic education service. I had recommended this service to you last year as medication will only do so much for lowering blood sugar. You have to make lifestyle modifications as well. If you would like to meet with them again please let me know.     Please schedule with endocrinology at their earliest appointment. Also, plan on following up with myself in 3 months to recheck the diabetes.     Sincerely,  Rigo Rivas PA-C

## 2024-09-29 ENCOUNTER — HOSPITAL ENCOUNTER (EMERGENCY)
Facility: CLINIC | Age: 74
Discharge: HOME | End: 2024-09-29
Attending: STUDENT IN AN ORGANIZED HEALTH CARE EDUCATION/TRAINING PROGRAM | Admitting: STUDENT IN AN ORGANIZED HEALTH CARE EDUCATION/TRAINING PROGRAM
Payer: COMMERCIAL

## 2024-09-29 ENCOUNTER — APPOINTMENT (OUTPATIENT)
Dept: GENERAL RADIOLOGY | Facility: CLINIC | Age: 74
End: 2024-09-29
Attending: STUDENT IN AN ORGANIZED HEALTH CARE EDUCATION/TRAINING PROGRAM
Payer: COMMERCIAL

## 2024-09-29 ENCOUNTER — APPOINTMENT (OUTPATIENT)
Dept: CT IMAGING | Facility: CLINIC | Age: 74
End: 2024-09-29
Attending: STUDENT IN AN ORGANIZED HEALTH CARE EDUCATION/TRAINING PROGRAM
Payer: COMMERCIAL

## 2024-09-29 VITALS
SYSTOLIC BLOOD PRESSURE: 132 MMHG | HEART RATE: 81 BPM | RESPIRATION RATE: 18 BRPM | DIASTOLIC BLOOD PRESSURE: 88 MMHG | OXYGEN SATURATION: 94 % | TEMPERATURE: 97.3 F

## 2024-09-29 DIAGNOSIS — S42.351A DISPLACED COMMINUTED FRACTURE OF SHAFT OF HUMERUS, RIGHT ARM, INITIAL ENCOUNTER FOR CLOSED FRACTURE: ICD-10-CM

## 2024-09-29 DIAGNOSIS — J96.21 ACUTE AND CHRONIC RESPIRATORY FAILURE WITH HYPOXIA (H): ICD-10-CM

## 2024-09-29 DIAGNOSIS — F10.229 ACUTE ALCOHOLIC INTOXICATION IN ALCOHOLISM WITH COMPLICATION (H): ICD-10-CM

## 2024-09-29 DIAGNOSIS — Z74.8: ICD-10-CM

## 2024-09-29 LAB
ALBUMIN SERPL BCG-MCNC: 3.7 G/DL (ref 3.5–5.2)
ALP SERPL-CCNC: 69 U/L (ref 40–150)
ALT SERPL W P-5'-P-CCNC: 31 U/L (ref 0–70)
ANION GAP SERPL CALCULATED.3IONS-SCNC: 15 MMOL/L (ref 7–15)
AST SERPL W P-5'-P-CCNC: 45 U/L (ref 0–45)
BASO+EOS+MONOS # BLD AUTO: NORMAL 10*3/UL
BASO+EOS+MONOS NFR BLD AUTO: NORMAL %
BASOPHILS # BLD AUTO: 0 10E3/UL (ref 0–0.2)
BASOPHILS # BLD AUTO: NORMAL 10*3/UL
BASOPHILS NFR BLD AUTO: 0 %
BASOPHILS NFR BLD AUTO: NORMAL %
BILIRUB SERPL-MCNC: 0.9 MG/DL
BUN SERPL-MCNC: 16 MG/DL (ref 8–23)
CALCIUM SERPL-MCNC: 8.3 MG/DL (ref 8.8–10.4)
CHLORIDE SERPL-SCNC: 102 MMOL/L (ref 98–107)
CREAT SERPL-MCNC: 0.98 MG/DL (ref 0.67–1.17)
EGFRCR SERPLBLD CKD-EPI 2021: 80 ML/MIN/1.73M2
EOSINOPHIL # BLD AUTO: 0.2 10E3/UL (ref 0–0.7)
EOSINOPHIL # BLD AUTO: NORMAL 10*3/UL
EOSINOPHIL NFR BLD AUTO: 1 %
EOSINOPHIL NFR BLD AUTO: NORMAL %
ERYTHROCYTE [DISTWIDTH] IN BLOOD BY AUTOMATED COUNT: 13.2 % (ref 10–15)
ERYTHROCYTE [DISTWIDTH] IN BLOOD BY AUTOMATED COUNT: NORMAL %
ETHANOL SERPL-MCNC: 0.17 G/DL
GLUCOSE SERPL-MCNC: 207 MG/DL (ref 70–99)
HCO3 SERPL-SCNC: 19 MMOL/L (ref 22–29)
HCT VFR BLD AUTO: 37.7 % (ref 40–53)
HCT VFR BLD AUTO: NORMAL %
HGB BLD-MCNC: 12.9 G/DL (ref 13.3–17.7)
HGB BLD-MCNC: NORMAL G/DL
IMM GRANULOCYTES # BLD: 0.1 10E3/UL
IMM GRANULOCYTES # BLD: NORMAL 10*3/UL
IMM GRANULOCYTES NFR BLD: 1 %
IMM GRANULOCYTES NFR BLD: NORMAL %
INR PPP: 0.95 (ref 0.85–1.15)
LYMPHOCYTES # BLD AUTO: 1.1 10E3/UL (ref 0.8–5.3)
LYMPHOCYTES # BLD AUTO: NORMAL 10*3/UL
LYMPHOCYTES NFR BLD AUTO: 9 %
LYMPHOCYTES NFR BLD AUTO: NORMAL %
MCH RBC QN AUTO: 31.6 PG (ref 26.5–33)
MCH RBC QN AUTO: NORMAL PG
MCHC RBC AUTO-ENTMCNC: 34.2 G/DL (ref 31.5–36.5)
MCHC RBC AUTO-ENTMCNC: NORMAL G/DL
MCV RBC AUTO: 92 FL (ref 78–100)
MCV RBC AUTO: NORMAL FL
MONOCYTES # BLD AUTO: 1.2 10E3/UL (ref 0–1.3)
MONOCYTES # BLD AUTO: NORMAL 10*3/UL
MONOCYTES NFR BLD AUTO: 10 %
MONOCYTES NFR BLD AUTO: NORMAL %
NEUTROPHILS # BLD AUTO: 9.3 10E3/UL (ref 1.6–8.3)
NEUTROPHILS # BLD AUTO: NORMAL 10*3/UL
NEUTROPHILS NFR BLD AUTO: 79 %
NEUTROPHILS NFR BLD AUTO: NORMAL %
NRBC # BLD AUTO: 0 10E3/UL
NRBC # BLD AUTO: NORMAL 10*3/UL
NRBC BLD AUTO-RTO: 0 /100
NRBC BLD AUTO-RTO: NORMAL %
PLATELET # BLD AUTO: 179 10E3/UL (ref 150–450)
PLATELET # BLD AUTO: NORMAL 10*3/UL
POTASSIUM SERPL-SCNC: 4.6 MMOL/L (ref 3.4–5.3)
PROT SERPL-MCNC: 6.6 G/DL (ref 6.4–8.3)
RBC # BLD AUTO: 4.08 10E6/UL (ref 4.4–5.9)
RBC # BLD AUTO: NORMAL 10*6/UL
SODIUM SERPL-SCNC: 136 MMOL/L (ref 135–145)
WBC # BLD AUTO: 11.9 10E3/UL (ref 4–11)
WBC # BLD AUTO: NORMAL 10*3/UL

## 2024-09-29 PROCEDURE — 70450 CT HEAD/BRAIN W/O DYE: CPT

## 2024-09-29 PROCEDURE — 71045 X-RAY EXAM CHEST 1 VIEW: CPT

## 2024-09-29 PROCEDURE — 82077 ASSAY SPEC XCP UR&BREATH IA: CPT | Performed by: STUDENT IN AN ORGANIZED HEALTH CARE EDUCATION/TRAINING PROGRAM

## 2024-09-29 PROCEDURE — 36415 COLL VENOUS BLD VENIPUNCTURE: CPT | Performed by: STUDENT IN AN ORGANIZED HEALTH CARE EDUCATION/TRAINING PROGRAM

## 2024-09-29 PROCEDURE — 85025 COMPLETE CBC W/AUTO DIFF WBC: CPT | Performed by: STUDENT IN AN ORGANIZED HEALTH CARE EDUCATION/TRAINING PROGRAM

## 2024-09-29 PROCEDURE — 85610 PROTHROMBIN TIME: CPT | Performed by: STUDENT IN AN ORGANIZED HEALTH CARE EDUCATION/TRAINING PROGRAM

## 2024-09-29 PROCEDURE — 250N000009 HC RX 250: Performed by: STUDENT IN AN ORGANIZED HEALTH CARE EDUCATION/TRAINING PROGRAM

## 2024-09-29 PROCEDURE — 73060 X-RAY EXAM OF HUMERUS: CPT | Mod: RT

## 2024-09-29 PROCEDURE — 96360 HYDRATION IV INFUSION INIT: CPT | Mod: 59

## 2024-09-29 PROCEDURE — 99285 EMERGENCY DEPT VISIT HI MDM: CPT | Mod: 25

## 2024-09-29 PROCEDURE — 94640 AIRWAY INHALATION TREATMENT: CPT

## 2024-09-29 PROCEDURE — 29105 APPLICATION LONG ARM SPLINT: CPT

## 2024-09-29 PROCEDURE — 258N000003 HC RX IP 258 OP 636: Performed by: STUDENT IN AN ORGANIZED HEALTH CARE EDUCATION/TRAINING PROGRAM

## 2024-09-29 PROCEDURE — 29105 APPLICATION LONG ARM SPLINT: CPT | Performed by: STUDENT IN AN ORGANIZED HEALTH CARE EDUCATION/TRAINING PROGRAM

## 2024-09-29 PROCEDURE — 82040 ASSAY OF SERUM ALBUMIN: CPT | Performed by: STUDENT IN AN ORGANIZED HEALTH CARE EDUCATION/TRAINING PROGRAM

## 2024-09-29 PROCEDURE — 99285 EMERGENCY DEPT VISIT HI MDM: CPT | Mod: 25 | Performed by: STUDENT IN AN ORGANIZED HEALTH CARE EDUCATION/TRAINING PROGRAM

## 2024-09-29 RX ORDER — OXYCODONE HYDROCHLORIDE 5 MG/1
5 TABLET ORAL EVERY 6 HOURS PRN
Qty: 6 TABLET | Refills: 0 | Status: SHIPPED | OUTPATIENT
Start: 2024-09-29

## 2024-09-29 RX ORDER — IPRATROPIUM BROMIDE AND ALBUTEROL SULFATE 2.5; .5 MG/3ML; MG/3ML
3 SOLUTION RESPIRATORY (INHALATION) ONCE
Status: DISCONTINUED | OUTPATIENT
Start: 2024-09-29 | End: 2024-09-29

## 2024-09-29 RX ORDER — IPRATROPIUM BROMIDE AND ALBUTEROL SULFATE 2.5; .5 MG/3ML; MG/3ML
3 SOLUTION RESPIRATORY (INHALATION)
Status: DISCONTINUED | OUTPATIENT
Start: 2024-09-29 | End: 2024-09-29 | Stop reason: HOSPADM

## 2024-09-29 RX ORDER — IPRATROPIUM BROMIDE AND ALBUTEROL SULFATE 2.5; .5 MG/3ML; MG/3ML
SOLUTION RESPIRATORY (INHALATION)
Status: DISCONTINUED
Start: 2024-09-29 | End: 2024-09-29 | Stop reason: HOSPADM

## 2024-09-29 RX ADMIN — SODIUM CHLORIDE 1000 ML: 9 INJECTION, SOLUTION INTRAVENOUS at 17:18

## 2024-09-29 RX ADMIN — IPRATROPIUM BROMIDE AND ALBUTEROL SULFATE 3 ML: .5; 3 SOLUTION RESPIRATORY (INHALATION) at 16:56

## 2024-09-29 ASSESSMENT — ACTIVITIES OF DAILY LIVING (ADL)
ADLS_ACUITY_SCORE: 38

## 2024-09-29 ASSESSMENT — COLUMBIA-SUICIDE SEVERITY RATING SCALE - C-SSRS
1. IN THE PAST MONTH, HAVE YOU WISHED YOU WERE DEAD OR WISHED YOU COULD GO TO SLEEP AND NOT WAKE UP?: NO
6. HAVE YOU EVER DONE ANYTHING, STARTED TO DO ANYTHING, OR PREPARED TO DO ANYTHING TO END YOUR LIFE?: NO
2. HAVE YOU ACTUALLY HAD ANY THOUGHTS OF KILLING YOURSELF IN THE PAST MONTH?: NO

## 2024-09-29 NOTE — ED TRIAGE NOTES
Brought in VIA EMS post fall with  right arm deformity. Pt admits to drinking today. Pt given dilaudid 1 mg at 15:57 and 2 mg Versed at 1623. Denies LOC. Alert and oriented with slurred speech.     Triage Assessment (Adult)       Row Name 09/29/24 1647          Triage Assessment    Airway WDL WDL     Additional Documentation Breath Sounds (Group)        Respiratory WDL    Respiratory WDL X;rhythm/pattern     Rhythm/Pattern, Respiratory shallow        Breath Sounds    Breath Sounds All Fields     All Lung Fields Breath Sounds wheezes, expiratory        Skin Circulation/Temperature WDL    Skin Circulation/Temperature WDL WDL        Cardiac WDL    Cardiac WDL WDL;rhythm     Cardiac Rhythm NSR        Cognitive/Neuro/Behavioral WDL    Cognitive/Neuro/Behavioral WDL speech;X     Level of Consciousness lethargic;somnolent     Arousal Level arouses to voice     Speech slurred     Mood/Behavior hypoactive (quiet, withdrawn)        Pupils (CN II)    Pupil PERRLA yes     Pupil Size Left 2 mm     Pupil Size Right 2 mm        [place] Coma Scale    Best Eye Response 4-->(E4) spontaneous     Best Motor Response 6-->(M6) obeys commands     Best Verbal Response 5-->(V5) oriented     [place] Coma Scale Score 15

## 2024-09-29 NOTE — PROGRESS NOTES
09/29/24 1656   Vital Signs   Resp 15   Pulse 75   Oximeter Heart Rate 74 bpm   Oxygen Therapy   SpO2 90 %   Nebulizer Assessment & Treatment   $RT Use ONLY Delivery Method Nebulizer - Initial   Nebulizer Device Face mask   Pretreatment Heart Rate (beats/min) 71   Pretreatment Resp Rate (breaths/min) 18   Pretreat Breath Sounds - Bilat - All Lobes coarse;wheezes, expiratory   Pretreat Breath Sounds - RUTH coarse;wheezes, expiratory   Pretreat Breath Sounds - LLL coarse;wheezes, expiratory   Pretreat Breath Sounds - RUL coarse;wheezes, expiratory   Pretreat Breath Sounds - RML crackles;wheezes, expiratory   Pretreat Breath Sounds - RLL coarse;wheezes, expiratory   Patient Position Wallace's   Respiratory Treatment Status (SVN) given   Breath Sounds Post-Respiratory Treatment   Posttreatment Heart Rate (beats/min) 75   Posttreatment Resp Rate (breaths/min) 18   Post treatment O2 Sats - (TCU only) (!) 70   Posttreatment Assessment (SVN) breath sounds improved;increased aeration   Signs of Intolerance (SVN) none   Breath Sounds Posttreatment All Fields aeration increased;coarse   Breath Sounds Posttreatment RUTH aeration increased;coarse   Breath Sounds Posttreatment LLL aeration increased;coarse   Breath Sounds Posttreatment RUL aeration increased;coarse   Breath Sounds Posttreatment RML aeration increased;coarse   Breath Sounds Posttreatment RLL aeration increased;coarse     Patient wheezing and coarse   EMS state patient vomited in rig with possible aspiration  Patient admits to drinking 5 beer today   Wheezing improved post Duo neb   Oxygen via oxy mask 10 liters to maintain SpO2 92%
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
## 2024-09-30 NOTE — DISCHARGE INSTRUCTIONS
Orthopedics will call you tomorrow to talk about appointment scheduling.  Please take the opiates only for severe pain under supportive care by others to ensure no other falls as this can cause dizziness and acute confusion.  Ibuprofen Tylenol should be a mainstay of treatment.  Please removing all alcohol from your repertoire would be significant for reducing risk of other falls and further injury to the arm.  Return if you start feeling any loss of sensation or discoloration of the hand as we discussed.

## 2024-10-10 ENCOUNTER — LAB REQUISITION (OUTPATIENT)
Dept: LAB | Facility: CLINIC | Age: 74
End: 2024-10-10

## 2024-10-10 ENCOUNTER — DOCUMENTATION ONLY (OUTPATIENT)
Dept: GERIATRICS | Facility: CLINIC | Age: 74
End: 2024-10-10
Payer: COMMERCIAL

## 2024-10-10 ENCOUNTER — PATIENT OUTREACH (OUTPATIENT)
Dept: CARE COORDINATION | Facility: CLINIC | Age: 74
End: 2024-10-10
Payer: COMMERCIAL

## 2024-10-10 NOTE — LETTER
[facility]   To:             Please give to facility    From:   Melissa Behl  RN  Care Coordinator   [facility]   P: [phone] [email or web address]  Patient Name:  Genaro Barbosa YOB: 1950   Admit date: 10/9/2024      *Information Needed:  Please contact me when the patient will discharge (or if they will move to long term care)- include the discharge date, disposition, and main diagnosis   If the patient is discharged with home care services, please provide the name of the agency      Phone or Email with information                   Thank you

## 2024-10-10 NOTE — PROGRESS NOTES
Clinic Care Coordination Contact  Care Coordination Transition Communication    Clinical Data: Patient was hospitalized at [facility] from 8/29/24 to 10/9/24 with diagnosis of closed displaced comminuted fracture of shaft of right humerus..     Assessment: Patient has transitioned to TCU/ARU for short term rehabilitation:    [facility]  Transition Communication:  Notified facility of Primary Care- Care Coordination support via Epic fax.    Plan: Care Coordinator will await notification from facility staff informing of patient's discharge plans/needs. Care Coordinator will review chart and outreach to facility staff every 4 weeks and as needed.     Melissa Behl BSN, RN, PHN, CCM  RN Clinical Product Navigator  [phone]

## 2024-10-11 ENCOUNTER — LAB REQUISITION (OUTPATIENT)
Dept: LAB | Facility: CLINIC | Age: 74
End: 2024-10-11

## 2024-10-11 ENCOUNTER — TRANSITIONAL CARE UNIT VISIT (OUTPATIENT)
Dept: GERIATRICS | Facility: CLINIC | Age: 74
End: 2024-10-11
Payer: COMMERCIAL

## 2024-10-11 VITALS
BODY MASS INDEX: 24.79 KG/M2 | OXYGEN SATURATION: 94 % | WEIGHT: 182.8 LBS | TEMPERATURE: 97.7 F | SYSTOLIC BLOOD PRESSURE: 114 MMHG | DIASTOLIC BLOOD PRESSURE: 73 MMHG | RESPIRATION RATE: 16 BRPM | HEART RATE: 75 BPM

## 2024-10-11 DIAGNOSIS — Z95.1 HX OF CABG: ICD-10-CM

## 2024-10-11 DIAGNOSIS — I10 ESSENTIAL HYPERTENSION: ICD-10-CM

## 2024-10-11 DIAGNOSIS — K21.9 GASTROESOPHAGEAL REFLUX DISEASE WITHOUT ESOPHAGITIS: ICD-10-CM

## 2024-10-11 DIAGNOSIS — S42.291D OTHER CLOSED DISPLACED FRACTURE OF PROXIMAL END OF RIGHT HUMERUS WITH ROUTINE HEALING, SUBSEQUENT ENCOUNTER: Primary | ICD-10-CM

## 2024-10-11 DIAGNOSIS — I25.10 ATHEROSCLEROTIC HEART DISEASE OF NATIVE CORONARY ARTERY WITHOUT ANGINA PECTORIS: ICD-10-CM

## 2024-10-11 DIAGNOSIS — E11.9 TYPE 2 DIABETES, HBA1C GOAL < 7% (H): ICD-10-CM

## 2024-10-11 DIAGNOSIS — E78.5 HYPERLIPIDEMIA LDL GOAL <70: ICD-10-CM

## 2024-10-11 DIAGNOSIS — I25.10 CORONARY ARTERY DISEASE INVOLVING NATIVE HEART WITHOUT ANGINA PECTORIS, UNSPECIFIED VESSEL OR LESION TYPE: ICD-10-CM

## 2024-10-11 DIAGNOSIS — I48.91 ATRIAL FIBRILLATION, UNSPECIFIED TYPE (H): ICD-10-CM

## 2024-10-11 PROBLEM — S42.201A CLOSED FRACTURE OF PROXIMAL END OF RIGHT HUMERUS, INITIAL ENCOUNTER: Status: ACTIVE | Noted: 2024-09-29

## 2024-10-11 PROBLEM — E66.01 MORBID OBESITY (H): Status: ACTIVE | Noted: 2024-04-15

## 2024-10-11 PROBLEM — R53.1 GENERALIZED WEAKNESS: Status: ACTIVE | Noted: 2024-09-30

## 2024-10-11 PROCEDURE — 86481 TB AG RESPONSE T-CELL SUSP: CPT | Performed by: FAMILY MEDICINE

## 2024-10-11 PROCEDURE — P9604 ONE-WAY ALLOW PRORATED TRIP: HCPCS | Performed by: FAMILY MEDICINE

## 2024-10-11 PROCEDURE — 99309 SBSQ NF CARE MODERATE MDM 30: CPT

## 2024-10-11 PROCEDURE — 36415 COLL VENOUS BLD VENIPUNCTURE: CPT | Performed by: FAMILY MEDICINE

## 2024-10-11 RX ORDER — POLYETHYLENE GLYCOL 3350 17 G/17G
17 POWDER, FOR SOLUTION ORAL DAILY
COMMUNITY

## 2024-10-11 RX ORDER — AMOXICILLIN 250 MG
2 CAPSULE ORAL DAILY
COMMUNITY

## 2024-10-11 RX ORDER — METHOCARBAMOL 500 MG/1
500 TABLET, FILM COATED ORAL 3 TIMES DAILY
COMMUNITY

## 2024-10-11 RX ORDER — MULTIVITAMIN,THERAPEUTIC
1 TABLET ORAL DAILY
COMMUNITY

## 2024-10-11 RX ORDER — GLIPIZIDE 10 MG/1
10 TABLET ORAL
COMMUNITY

## 2024-10-11 RX ORDER — CALCIUM CARBONATE 500 MG/1
2 TABLET, CHEWABLE ORAL
COMMUNITY

## 2024-10-11 RX ORDER — HYDROMORPHONE HYDROCHLORIDE 4 MG/1
4 TABLET ORAL
COMMUNITY
End: 2024-10-15

## 2024-10-11 RX ORDER — ACETAMINOPHEN 500 MG
1000 TABLET ORAL 3 TIMES DAILY
COMMUNITY

## 2024-10-11 RX ORDER — ROSUVASTATIN CALCIUM 40 MG/1
40 TABLET, COATED ORAL DAILY
COMMUNITY

## 2024-10-11 RX ORDER — METOPROLOL TARTRATE 50 MG
50 TABLET ORAL 2 TIMES DAILY
COMMUNITY

## 2024-10-11 NOTE — LETTER
10/11/2024      Genaro Barbosa  [address]  [facility] [address]        [facility] GERIATRICS    PRIMARY CARE PROVIDER AND CLINIC:  Rigo Rivas PA-C, [address] [facility] [address]  Chief Complaint   Patient presents with     Hospital F/U      [place] Medical Record Number:  [id number]  Place of Service where encounter took place:  [facility] ([facility]) [[id number]]    Genaro Barbosa  is a 74 year old  (1950), admitted to the above facility from  [facility]. Hospital stay 9/29/2024 through 10/9/2024..   HPI:    jPer chart review, patient suffered a fall on 9/29/24 while at a bar with BAL 0.17 and sustained a comminuted right proximal humerus fracture and was sent home with sling/immobilizer to follow-up with orthopedic surgery. Later thay day, he was found on the ground and was weak and confused and send back to [facility]. Patient had ORIF on 10/4/24. Patient's history notable for CAD s/p CABG, hypertension, hyperlipidemia and type 2 diabetes on insulin.     Today, patient says his pain has decreased considerably and is currently experiencing pain rated 3 out of 10 of his right arm. He denies needing bowel medications and says he had a BM yesterday.     CODE STATUS/ADVANCE DIRECTIVES DISCUSSION:  Prior  CPR/Full code   ALLERGIES:   Allergies   Allergen Reactions     Ace Inhibitors Cough     Contrast Dye Other (See Comments)     Patient felt like ants were crawling all over him/ per patient's explaination 12-15-16/tw     Oxycodone      Other Reaction(s): Mental Status Change      PAST MEDICAL HISTORY:   Past Medical History:   Diagnosis Date     Coronary artery disease     stents x8     Diabetic eye exam (H) 10/11/12    [facility]     Fatty liver      Gallstones 2/1/11    hospitalized with RUQ pain     History of tobacco use: 1966 - 1976, 1/2 ppd 10/3/2011     Hyperlipidemia LDL goal < 100      Hypertension goal BP (blood pressure) <  130/80      Pyloric stenosis, congenital (H)      Type 2 diabetes, HbA1c goal < 7% (H)       PAST SURGICAL HISTORY:   has a past surgical history that includes PTA EXTREMITY ARTERY, EACH ADDITIONAL (1991); Cardiac surgery; pyloric stenosis; Phacoemulsification with standard intraocular lens implant (Right, 12/21/2023); and Phacoemulsification with standard intraocular lens implant (Left, 1/4/2024).  FAMILY HISTORY: family history includes Asthma in his son; C.A.D. in his mother; Circulatory in his mother; Diabetes in his father; Genitourinary Problems in his brother; Heart Disease in his mother; Hypertension in his mother; Lipids in his mother.  SOCIAL HISTORY:   reports that he has quit smoking. He has never used smokeless tobacco. He reports current alcohol use. He reports that he does not use drugs.  Patient's living condition: lives with spouse in apartment in [place]; he is primary caretaker of his wife who is disabled after a stroke. His 2 sons and granddaughter are taking care of his wife currently.     Post Discharge Medication Reconciliation Status:   Post Medication Reconciliation Status:  Discharge medications reconciled, continue medications without change       Current Outpatient Medications   Medication Sig Dispense Refill     acetaminophen (TYLENOL) 500 MG tablet Take 1,000 mg by mouth 3 times daily.       calcium carbonate (TUMS) 500 MG chewable tablet Take 2 chew tab by mouth every 2 hours as needed for heartburn.       diclofenac (VOLTAREN) 1 % topical gel Apply 2 g topically 4 times daily.       empagliflozin (JARDIANCE) 25 MG TABS tablet Take 25 mg by mouth daily.       glipiZIDE (GLUCOTROL) 10 MG tablet Take 10 mg by mouth 2 times daily (before meals).       insulin glargine (LANTUS PEN) 100 UNIT/ML pen Inject 20 Units subcutaneously at bedtime.       magnesium hydroxide (MILK OF MAGNESIA) 400 MG/5ML suspension Take 30 mLs by mouth daily as needed for constipation or heartburn.       metFORMIN  (GLUCOPHAGE) 1000 MG tablet Take 1,000 mg by mouth 2 times daily (with meals).       methocarbamol (ROBAXIN) 500 MG tablet Take 500 mg by mouth 3 times daily.       metoprolol tartrate (LOPRESSOR) 50 MG tablet Take 50 mg by mouth 2 times daily.       multivitamin, therapeutic (THERA-VIT) TABS tablet Take 1 tablet by mouth daily.       omeprazole (PRILOSEC) 20 MG DR capsule Take 20 mg by mouth daily.       polyethylene glycol (MIRALAX) 17 g packet Take 17 g by mouth daily.       rosuvastatin (CRESTOR) 40 MG tablet Take 40 mg by mouth daily.       senna-docusate (SENOKOT-S/PERICOLACE) 8.6-50 MG tablet Take 2 tablets by mouth daily.       blood glucose (NO BRAND SPECIFIED) test strip Use to test blood sugar 1 times daily or as directed. To accompany: Blood Glucose Monitor Brands: per insurance. 100 strip 6     blood glucose (NO BRAND SPECIFIED) test strip Use to test blood sugar 2 times daily or as directed.  ACCU-CHEK JUAN (Patient taking differently: 1 strip by In Vitro route. monthly or as directed.  ACCU-CHEK JUAN) 100 strip 11     blood glucose monitoring (NO BRAND SPECIFIED) meter device kit Use to test blood sugar 1 times daily or as directed. Preferred blood glucose meter OR supplies to accompany: Blood Glucose Monitor Brands: per insurance. 1 kit 0     blood glucose monitoring (NO BRAND SPECIFIED) meter device kit Use to test blood sugar 2 times daily or as directed. (Patient taking differently: by In Vitro route every 30 days. or as directed.) 1 kit 0     diclofenac (VOLTAREN) 1 % topical gel Apply 2 g topically 4 times daily as needed for moderate pain 50 g 1     empagliflozin (JARDIANCE) 25 MG TABS tablet Take 1 tablet (25 mg) by mouth daily. 30 tablet 2     glipiZIDE (GLUCOTROL) 10 MG tablet Take 1 tablet (10 mg) by mouth 2 times daily (before meals). 180 tablet 0     HYDROmorphone (DILAUDID) 4 MG tablet Take 1 tablet (4 mg) by mouth every 3 hours as needed for severe pain. 56 tablet 0     insulin  glargine (LANTUS PEN) 100 UNIT/ML pen Inject 18 Units subcutaneously at bedtime for 3 days, THEN 20 Units at bedtime for 27 days. 18 mL 0     insulin pen needle (32G X 4 MM) 32G X 4 MM miscellaneous Use 1 pen needles daily or as directed. 100 each 3     metFORMIN (GLUCOPHAGE XR) 500 MG 24 hr tablet Take 2 tablets (1,000 mg) by mouth 2 times daily (with meals). 360 tablet 0     metoprolol tartrate (LOPRESSOR) 50 MG tablet Take 1 tablet (50 mg) by mouth 2 times daily. 180 tablet 1     omeprazole (PRILOSEC) 20 MG DR capsule Take 1 capsule (20 mg) by mouth daily. 90 capsule 1     oxyCODONE (ROXICODONE) 5 MG tablet Take 1 tablet (5 mg) by mouth every 6 hours as needed for severe pain. 6 tablet 0     rosuvastatin (CRESTOR) 40 MG tablet Take 1 tablet (40 mg) by mouth daily 90 tablet 3     thin (NO BRAND SPECIFIED) lancets Use with lanceting device. To accompany: Blood Glucose Monitor Brands: per insurance. 100 each 6     No current facility-administered medications for this visit.       ROS:  4 point ROS including Respiratory, CV, GI and , other than that noted in the HPI,  is negative    Vitals:  /73   Pulse 75   Temp 97.7  F (36.5  C)   Resp 16   Wt 82.9 kg (182 lb 12.8 oz)   SpO2 94%   BMI 24.79 kg/m    Exam:  GENERAL APPEARANCE:  Alert, in no distress, cooperative  ENT:  Mouth and posterior oropharynx normal, moist mucous membranes  EYES:  EOM, conjunctivae, lids, pupils and irises normal  RESP:  respiratory effort and palpation of chest normal, lungs clear to auscultation   CV:  regular rate and rhythm, no murmur, rub, or gallop, +2 pedal pulses  M/S:   Gait and station normal  Right arm with ace wrap; no edema noted; right hand and digits with good perfusion and movement  SKIN:  Inspection of skin and subcutaneous tissue baseline  NEURO:   Cranial nerves 2-12 are normal tested and grossly at patient's baseline  PSYCH:  oriented X 3, affect and mood normal    Lab/Diagnostic data:  Labs done in SNF are in  [facility]. Please refer to them using [facility]/Care Everywhere.    ASSESSMENT/PLAN:    Other closed displaced fracture of proximal end of right humerus with routine healing, subsequent encounter  NWB with sling/immobilizer for 6-12 weeks. PT/OT for rehabilitation.   Analgesia with dilaudid 4 mg every 3 hours as needed and methocarbamol 500 mg TID. Will attempt to wean after follow-up with ortho scheduled on 10/22/24.     Type 2 diabetes, HbA1c goal < 7% (H)  BG monitoring QID. BG's have been between 146-252 in last 2 days.   Continue glipizide 10 mg BID, metformin 1000 mg BID, and Lantus 20 units daily at bedtime.     Coronary artery disease involving native heart without angina pectoris, unspecified vessel or lesion type  Hx of CABG  Essential hypertension  Hyperlipidemia LDL goal <70  Afib  Stents placed in 1992, 1997, 1998, 1999, 2008, 2011. Last EKG on 11/5/24 was NSR. Per patient, cardiologist took him off aspirin years ago.   SBP's ranging from 108 to 140 and DBP's between 59 and 80.   Continue jardiance 25 mg daily, metoprolol 50 mg BID and rosuvastatin 40 mg daily.       Gastroesophageal reflux disease without esophagitis  Continue omeprazole 20 mg daily.       Orders:  Labs: CBC, BMP, HgbA1c      Electronically signed by:  RACIEL Santos CNP                   Sincerely,        RACIEL Santos CNP

## 2024-10-11 NOTE — PROGRESS NOTES
[facility] GERIATRICS    PRIMARY CARE PROVIDER AND CLINIC:  Rigo Rivas PA-C, [address] [facility] [address]  Chief Complaint   Patient presents with    Hospital F/U      [place] Medical Record Number:  [id number]  Place of Service where encounter took place:  [facility] ([facility]) [[id number]]    Genaro Barbosa  is a 74 year old  (1950), admitted to the above facility from  [facility]. Hospital stay 9/29/2024 through 10/9/2024..   HPI:    jPer chart review, patient suffered a fall on 9/29/24 while at a bar with BAL 0.17 and sustained a comminuted right proximal humerus fracture and was sent home with sling/immobilizer to follow-up with orthopedic surgery. Later thay day, he was found on the ground and was weak and confused and send back to [facility]. Patient had ORIF on 10/4/24. Patient's history notable for CAD s/p CABG, hypertension, hyperlipidemia and type 2 diabetes on insulin.     Today, patient says his pain has decreased considerably and is currently experiencing pain rated 3 out of 10 of his right arm. He denies needing bowel medications and says he had a BM yesterday.     CODE STATUS/ADVANCE DIRECTIVES DISCUSSION:  Prior  CPR/Full code   ALLERGIES:   Allergies   Allergen Reactions    Ace Inhibitors Cough    Contrast Dye Other (See Comments)     Patient felt like ants were crawling all over him/ per patient's explaination 12-15-16/tw    Oxycodone      Other Reaction(s): Mental Status Change      PAST MEDICAL HISTORY:   Past Medical History:   Diagnosis Date    Coronary artery disease     stents x8    Diabetic eye exam (H) 10/11/12    [facility]    Fatty liver     Gallstones 2/1/11    hospitalized with RUQ pain    History of tobacco use: 1966 - 1976, 1/2 ppd 10/3/2011    Hyperlipidemia LDL goal < 100     Hypertension goal BP (blood pressure) < 130/80     Pyloric stenosis, congenital (H)     Type 2 diabetes, HbA1c goal < 7% (H)       PAST  SURGICAL HISTORY:   has a past surgical history that includes PTA EXTREMITY ARTERY, EACH ADDITIONAL (1991); Cardiac surgery; pyloric stenosis; Phacoemulsification with standard intraocular lens implant (Right, 12/21/2023); and Phacoemulsification with standard intraocular lens implant (Left, 1/4/2024).  FAMILY HISTORY: family history includes Asthma in his son; C.A.D. in his mother; Circulatory in his mother; Diabetes in his father; Genitourinary Problems in his brother; Heart Disease in his mother; Hypertension in his mother; Lipids in his mother.  SOCIAL HISTORY:   reports that he has quit smoking. He has never used smokeless tobacco. He reports current alcohol use. He reports that he does not use drugs.  Patient's living condition: lives with spouse in apartment in [place]; he is primary caretaker of his wife who is disabled after a stroke. His 2 sons and granddaughter are taking care of his wife currently.     Post Discharge Medication Reconciliation Status:   Post Medication Reconciliation Status:  Discharge medications reconciled, continue medications without change       Current Outpatient Medications   Medication Sig Dispense Refill    acetaminophen (TYLENOL) 500 MG tablet Take 1,000 mg by mouth 3 times daily.      calcium carbonate (TUMS) 500 MG chewable tablet Take 2 chew tab by mouth every 2 hours as needed for heartburn.      diclofenac (VOLTAREN) 1 % topical gel Apply 2 g topically 4 times daily.      empagliflozin (JARDIANCE) 25 MG TABS tablet Take 25 mg by mouth daily.      glipiZIDE (GLUCOTROL) 10 MG tablet Take 10 mg by mouth 2 times daily (before meals).      insulin glargine (LANTUS PEN) 100 UNIT/ML pen Inject 20 Units subcutaneously at bedtime.      magnesium hydroxide (MILK OF MAGNESIA) 400 MG/5ML suspension Take 30 mLs by mouth daily as needed for constipation or heartburn.      metFORMIN (GLUCOPHAGE) 1000 MG tablet Take 1,000 mg by mouth 2 times daily (with meals).      methocarbamol  (ROBAXIN) 500 MG tablet Take 500 mg by mouth 3 times daily.      metoprolol tartrate (LOPRESSOR) 50 MG tablet Take 50 mg by mouth 2 times daily.      multivitamin, therapeutic (THERA-VIT) TABS tablet Take 1 tablet by mouth daily.      omeprazole (PRILOSEC) 20 MG DR capsule Take 20 mg by mouth daily.      polyethylene glycol (MIRALAX) 17 g packet Take 17 g by mouth daily.      rosuvastatin (CRESTOR) 40 MG tablet Take 40 mg by mouth daily.      senna-docusate (SENOKOT-S/PERICOLACE) 8.6-50 MG tablet Take 2 tablets by mouth daily.      blood glucose (NO BRAND SPECIFIED) test strip Use to test blood sugar 1 times daily or as directed. To accompany: Blood Glucose Monitor Brands: per insurance. 100 strip 6    blood glucose (NO BRAND SPECIFIED) test strip Use to test blood sugar 2 times daily or as directed.  ACCU-CHEK JUAN (Patient taking differently: 1 strip by In Vitro route. monthly or as directed.  ACCU-CHEK JUAN) 100 strip 11    blood glucose monitoring (NO BRAND SPECIFIED) meter device kit Use to test blood sugar 1 times daily or as directed. Preferred blood glucose meter OR supplies to accompany: Blood Glucose Monitor Brands: per insurance. 1 kit 0    blood glucose monitoring (NO BRAND SPECIFIED) meter device kit Use to test blood sugar 2 times daily or as directed. (Patient taking differently: by In Vitro route every 30 days. or as directed.) 1 kit 0    diclofenac (VOLTAREN) 1 % topical gel Apply 2 g topically 4 times daily as needed for moderate pain 50 g 1    empagliflozin (JARDIANCE) 25 MG TABS tablet Take 1 tablet (25 mg) by mouth daily. 30 tablet 2    glipiZIDE (GLUCOTROL) 10 MG tablet Take 1 tablet (10 mg) by mouth 2 times daily (before meals). 180 tablet 0    HYDROmorphone (DILAUDID) 4 MG tablet Take 1 tablet (4 mg) by mouth every 3 hours as needed for severe pain. 56 tablet 0    insulin glargine (LANTUS PEN) 100 UNIT/ML pen Inject 18 Units subcutaneously at bedtime for 3 days, THEN 20 Units at bedtime for  27 days. 18 mL 0    insulin pen needle (32G X 4 MM) 32G X 4 MM miscellaneous Use 1 pen needles daily or as directed. 100 each 3    metFORMIN (GLUCOPHAGE XR) 500 MG 24 hr tablet Take 2 tablets (1,000 mg) by mouth 2 times daily (with meals). 360 tablet 0    metoprolol tartrate (LOPRESSOR) 50 MG tablet Take 1 tablet (50 mg) by mouth 2 times daily. 180 tablet 1    omeprazole (PRILOSEC) 20 MG DR capsule Take 1 capsule (20 mg) by mouth daily. 90 capsule 1    oxyCODONE (ROXICODONE) 5 MG tablet Take 1 tablet (5 mg) by mouth every 6 hours as needed for severe pain. 6 tablet 0    rosuvastatin (CRESTOR) 40 MG tablet Take 1 tablet (40 mg) by mouth daily 90 tablet 3    thin (NO BRAND SPECIFIED) lancets Use with lanceting device. To accompany: Blood Glucose Monitor Brands: per insurance. 100 each 6     No current facility-administered medications for this visit.       ROS:  4 point ROS including Respiratory, CV, GI and , other than that noted in the HPI,  is negative    Vitals:  /73   Pulse 75   Temp 97.7  F (36.5  C)   Resp 16   Wt 82.9 kg (182 lb 12.8 oz)   SpO2 94%   BMI 24.79 kg/m    Exam:  GENERAL APPEARANCE:  Alert, in no distress, cooperative  ENT:  Mouth and posterior oropharynx normal, moist mucous membranes  EYES:  EOM, conjunctivae, lids, pupils and irises normal  RESP:  respiratory effort and palpation of chest normal, lungs clear to auscultation   CV:  regular rate and rhythm, no murmur, rub, or gallop, +2 pedal pulses  M/S:   Gait and station normal  Right arm with ace wrap; no edema noted; right hand and digits with good perfusion and movement  SKIN:  Inspection of skin and subcutaneous tissue baseline  NEURO:   Cranial nerves 2-12 are normal tested and grossly at patient's baseline  PSYCH:  oriented X 3, affect and mood normal    Lab/Diagnostic data:  Labs done in SNF are in [place] [facility]. Please refer to them using [facility] [place]/Care Everywhere.    ASSESSMENT/PLAN:    Other closed displaced fracture of  proximal end of right humerus with routine healing, subsequent encounter  NWB with sling/immobilizer for 6-12 weeks. PT/OT for rehabilitation.   Analgesia with dilaudid 4 mg every 3 hours as needed and methocarbamol 500 mg TID. Will attempt to wean after follow-up with ortho scheduled on 10/22/24.     Type 2 diabetes, HbA1c goal < 7% (H)  BG monitoring QID. BG's have been between 146-252 in last 2 days.   Continue glipizide 10 mg BID, metformin 1000 mg BID, and Lantus 20 units daily at bedtime.     Coronary artery disease involving native heart without angina pectoris, unspecified vessel or lesion type  Hx of CABG  Essential hypertension  Hyperlipidemia LDL goal <70  Afib  Stents placed in 1992, 1997, 1998, 1999, 2008, 2011. Last EKG on 11/5/24 was NSR. Per patient, cardiologist took him off aspirin years ago.   SBP's ranging from 108 to 140 and DBP's between 59 and 80.   Continue jardiance 25 mg daily, metoprolol 50 mg BID and rosuvastatin 40 mg daily.       Gastroesophageal reflux disease without esophagitis  Continue omeprazole 20 mg daily.       Orders:  Labs: CBC, BMP, HgbA1c      Electronically signed by:  RACIEL Santos CNP

## 2024-10-12 ENCOUNTER — HEALTH MAINTENANCE LETTER (OUTPATIENT)
Age: 74
End: 2024-10-12

## 2024-10-12 LAB
QUANTIFERON MITOGEN: 0.32 IU/ML
QUANTIFERON NIL TUBE: 0.02 IU/ML
QUANTIFERON TB1 TUBE: 0.06 IU/ML
QUANTIFERON TB2 TUBE: 0.05

## 2024-10-14 ENCOUNTER — DOCUMENTATION ONLY (OUTPATIENT)
Dept: OTHER | Facility: CLINIC | Age: 74
End: 2024-10-14
Payer: COMMERCIAL

## 2024-10-14 LAB
ANION GAP SERPL CALCULATED.3IONS-SCNC: 12 MMOL/L (ref 7–15)
BUN SERPL-MCNC: 17.1 MG/DL (ref 8–23)
CALCIUM SERPL-MCNC: 8.9 MG/DL (ref 8.8–10.4)
CHLORIDE SERPL-SCNC: 103 MMOL/L (ref 98–107)
CREAT SERPL-MCNC: 0.9 MG/DL (ref 0.67–1.17)
EGFRCR SERPLBLD CKD-EPI 2021: 89 ML/MIN/1.73M2
ERYTHROCYTE [DISTWIDTH] IN BLOOD BY AUTOMATED COUNT: 13.2 % (ref 10–15)
EST. AVERAGE GLUCOSE BLD GHB EST-MCNC: 177 MG/DL
GAMMA INTERFERON BACKGROUND BLD IA-ACNC: 0.02 IU/ML
GLUCOSE SERPL-MCNC: 127 MG/DL (ref 70–99)
HBA1C MFR BLD: 7.8 %
HCO3 SERPL-SCNC: 21 MMOL/L (ref 22–29)
HCT VFR BLD AUTO: 33.1 % (ref 40–53)
HGB BLD-MCNC: 11.2 G/DL (ref 13.3–17.7)
M TB IFN-G BLD-IMP: ABNORMAL
M TB IFN-G CD4+ BCKGRND COR BLD-ACNC: 0.3 IU/ML
MCH RBC QN AUTO: 30.8 PG (ref 26.5–33)
MCHC RBC AUTO-ENTMCNC: 33.8 G/DL (ref 31.5–36.5)
MCV RBC AUTO: 91 FL (ref 78–100)
MITOGEN IGNF BCKGRD COR BLD-ACNC: 0.03 IU/ML
MITOGEN IGNF BCKGRD COR BLD-ACNC: 0.04 IU/ML
PLATELET # BLD AUTO: 298 10E3/UL (ref 150–450)
POTASSIUM SERPL-SCNC: 4.2 MMOL/L (ref 3.4–5.3)
RBC # BLD AUTO: 3.64 10E6/UL (ref 4.4–5.9)
SODIUM SERPL-SCNC: 136 MMOL/L (ref 135–145)
WBC # BLD AUTO: 7.9 10E3/UL (ref 4–11)

## 2024-10-14 PROCEDURE — 85027 COMPLETE CBC AUTOMATED: CPT

## 2024-10-14 PROCEDURE — 80048 BASIC METABOLIC PNL TOTAL CA: CPT

## 2024-10-14 PROCEDURE — 82947 ASSAY GLUCOSE BLOOD QUANT: CPT

## 2024-10-14 PROCEDURE — 83036 HEMOGLOBIN GLYCOSYLATED A1C: CPT

## 2024-10-14 PROCEDURE — 82310 ASSAY OF CALCIUM: CPT

## 2024-10-14 PROCEDURE — 36415 COLL VENOUS BLD VENIPUNCTURE: CPT

## 2024-10-15 ENCOUNTER — TRANSITIONAL CARE UNIT VISIT (OUTPATIENT)
Dept: GERIATRICS | Facility: CLINIC | Age: 74
End: 2024-10-15
Payer: COMMERCIAL

## 2024-10-15 VITALS
HEIGHT: 72 IN | HEART RATE: 81 BPM | RESPIRATION RATE: 18 BRPM | BODY MASS INDEX: 24.76 KG/M2 | TEMPERATURE: 97.2 F | SYSTOLIC BLOOD PRESSURE: 134 MMHG | WEIGHT: 182.8 LBS | DIASTOLIC BLOOD PRESSURE: 70 MMHG | OXYGEN SATURATION: 98 %

## 2024-10-15 DIAGNOSIS — S42.291D OTHER CLOSED DISPLACED FRACTURE OF PROXIMAL END OF RIGHT HUMERUS WITH ROUTINE HEALING, SUBSEQUENT ENCOUNTER: Primary | ICD-10-CM

## 2024-10-15 DIAGNOSIS — I48.91 ATRIAL FIBRILLATION, UNSPECIFIED TYPE (H): ICD-10-CM

## 2024-10-15 DIAGNOSIS — I10 ESSENTIAL HYPERTENSION: ICD-10-CM

## 2024-10-15 DIAGNOSIS — I25.10 CORONARY ARTERY DISEASE INVOLVING NATIVE HEART WITHOUT ANGINA PECTORIS, UNSPECIFIED VESSEL OR LESION TYPE: ICD-10-CM

## 2024-10-15 DIAGNOSIS — E78.5 HYPERLIPIDEMIA LDL GOAL <70: ICD-10-CM

## 2024-10-15 DIAGNOSIS — Z95.1 HX OF CABG: ICD-10-CM

## 2024-10-15 DIAGNOSIS — E11.9 TYPE 2 DIABETES, HBA1C GOAL < 7% (H): ICD-10-CM

## 2024-10-15 DIAGNOSIS — K21.9 GASTROESOPHAGEAL REFLUX DISEASE WITHOUT ESOPHAGITIS: ICD-10-CM

## 2024-10-15 PROBLEM — E66.01 MORBID OBESITY (H): Status: RESOLVED | Noted: 2024-04-15 | Resolved: 2024-10-15

## 2024-10-15 PROCEDURE — 99309 SBSQ NF CARE MODERATE MDM 30: CPT

## 2024-10-15 RX ORDER — HYDROMORPHONE HYDROCHLORIDE 4 MG/1
4 TABLET ORAL
Qty: 56 TABLET | Refills: 0 | Status: SHIPPED | OUTPATIENT
Start: 2024-10-15 | End: 2024-10-25

## 2024-10-15 NOTE — LETTER
10/15/2024      Genaro Barbosa  [address]  [facility] [address]        [facility] GERIATRICS    Chief Complaint   Patient presents with     RECHECK     HPI:  Genaro Barbosa is a 74 year old  (1950), who is being seen today for an episodic care visit at: [facility] ([facility]) [[id number]]. Per chart review, patient suffered a fall on 9/29/24 while at a bar with BAL 0.17 and sustained a comminuted right proximal humerus fracture and was sent home with sling/immobilizer to follow-up with orthopedic surgery. Later thay day, he was found on the ground and was weak and confused and send back to [facility]. Patient had ORIF on 10/4/24. Patient's history notable for CAD s/p CABG, hypertension, hyperlipidemia and type 2 diabetes on insulin. Today's concern is: Staff reports patient has been asking for Dilaudid more often than every 3 hours. Upon visit today, there is a gap that appears swollen near right elbow between the compression wrap on right upper arm and ace wrap on right forearm. Patient reports that his pain level is 12 out of 10. He reports that he has been sleeping okay and using ice at night. Patient has no other concerns.     Allergies, and PMH/PSH reviewed in [facility] today.  REVIEW OF SYSTEMS:  4 point ROS including Respiratory, CV, GI and , other than that noted in the HPI,  is negative    Objective:   /70   Pulse 81   Temp 97.2  F (36.2  C)   Resp 18   Ht 1.829 m (6')   Wt 82.9 kg (182 lb 12.8 oz)   SpO2 98%   BMI 24.79 kg/m    GENERAL APPEARANCE:  Alert, in no distress  RESP:  respiratory effort and palpation of chest normal, lungs clear to auscultation   CV:  regular rate and rhythm, no murmur, rub, or gallop, +2 pedal pulses  M/S:   Edema noted of right elbow. No edema of right hand with normal  strength.   SKIN:  Inspection of skin and subcutaneous tissue baseline    Labs done in SNF are in [facility]. Please refer to them using  EPIC/Care Everywhere.    Assessment/Plan:  Other closed displaced fracture of proximal end of right humerus with routine healing, subsequent encounter  NWB with sling/immobilizer for 6-12 weeks. PT/OT for rehabilitation.   Analgesia with dilaudid 4 mg every 3 hours as needed and methocarbamol 500 mg TID. Will attempt to wean after follow-up with ortho scheduled on 10/22/24.   Encourage elevation of right arm and ice throughout the day for additional pain control.      Type 2 diabetes, HbA1c goal < 7% (H)  BG monitoring QID. BG's have been between 146-252 in last 2 days.   Continue glipizide 10 mg BID, metformin 1000 mg BID, and Lantus 20 units daily at bedtime.   10/14/24: Alc dropped to 7.8 from 11.1 on 8/27/24.      Coronary artery disease involving native heart without angina pectoris, unspecified vessel or lesion type  Hx of CABG  Essential hypertension  Hyperlipidemia LDL goal <70  Afib  Stents placed in 1992, 1997, 1998, 1999, 2008, 2011. Last EKG on 11/5/24 was NSR. Per patient, cardiologist took him off aspirin years ago.   SBP's ranging from 108 to 140 and DBP's between 59 and 80.   Continue jardiance 25 mg daily, metoprolol 50 mg BID and rosuvastatin 40 mg daily.         Gastroesophageal reflux disease without esophagitis  Continue omeprazole 20 mg daily.         Orders:  None        Electronically signed by:  RACIEL Santos CNP            Sincerely,        RACIEL Santos CNP

## 2024-10-15 NOTE — TELEPHONE ENCOUNTER
Orders Placed This Encounter   Medications    HYDROmorphone (DILAUDID) 4 MG tablet     Sig: Take 1 tablet (4 mg) by mouth every 3 hours as needed for severe pain.     Dispense:  56 tablet     Refill:  0     7 day supply until patient is seen by ortho on 10/22/24.   RACIEL Santos CNP

## 2024-10-23 ENCOUNTER — DISCHARGE SUMMARY NURSING HOME (OUTPATIENT)
Dept: GERIATRICS | Facility: CLINIC | Age: 74
End: 2024-10-23
Payer: COMMERCIAL

## 2024-10-23 DIAGNOSIS — E78.5 HYPERLIPIDEMIA LDL GOAL <70: ICD-10-CM

## 2024-10-23 DIAGNOSIS — I10 ESSENTIAL HYPERTENSION: ICD-10-CM

## 2024-10-23 DIAGNOSIS — Z95.1 HX OF CABG: ICD-10-CM

## 2024-10-23 DIAGNOSIS — E11.9 TYPE 2 DIABETES, HBA1C GOAL < 7% (H): ICD-10-CM

## 2024-10-23 DIAGNOSIS — K21.9 GASTROESOPHAGEAL REFLUX DISEASE WITHOUT ESOPHAGITIS: ICD-10-CM

## 2024-10-23 DIAGNOSIS — I25.10 CORONARY ARTERY DISEASE INVOLVING NATIVE HEART WITHOUT ANGINA PECTORIS, UNSPECIFIED VESSEL OR LESION TYPE: ICD-10-CM

## 2024-10-23 DIAGNOSIS — I48.91 ATRIAL FIBRILLATION, UNSPECIFIED TYPE (H): ICD-10-CM

## 2024-10-23 DIAGNOSIS — S42.291D OTHER CLOSED DISPLACED FRACTURE OF PROXIMAL END OF RIGHT HUMERUS WITH ROUTINE HEALING, SUBSEQUENT ENCOUNTER: Primary | ICD-10-CM

## 2024-10-23 PROCEDURE — 99309 SBSQ NF CARE MODERATE MDM 30: CPT

## 2024-10-23 NOTE — PROGRESS NOTES
[facility] GERIATRICS    Chief Complaint   Patient presents with    RECHECK     HPI:  Genaro Barbosa is a 74 year old  (1950), who is being seen today for an episodic care visit [facility]: [facility] [[id number]]. Per chart review, patient suffered a fall on 9/29/24 while at a bar with BAL 0.17 and sustained a comminuted right proximal humerus fracture and was sent home with sling/immobilizer to follow-up with orthopedic surgery. Later thay day, he was found on the ground and was weak and confused and send back to [facility]. Patient had ORIF on 10/4/24. Patient's history notable for CAD s/p CABG, hypertension, hyperlipidemia and type 2 diabetes on insulin. Today's concern is: Staff reports patient has been asking for Dilaudid more often than every 3 hours. Upon visit today, there is a gap that appears swollen near right elbow between the compression wrap on right upper arm and ace wrap on right forearm. Patient reports that his pain level is 12 out of 10. He reports that he has been sleeping okay and using ice at night. Patient has no other concerns.     Allergies, and PMH/PSH reviewed in EPIC today.  REVIEW OF SYSTEMS:  4 point ROS including Respiratory, CV, GI and , other than that noted in the HPI,  is negative    Objective:   /70   Pulse 81   Temp 97.2  F (36.2  C)   Resp 18   Ht 1.829 m (6')   Wt 82.9 kg (182 lb 12.8 oz)   SpO2 98%   BMI 24.79 kg/m    GENERAL APPEARANCE:  Alert, in no distress  RESP:  respiratory effort and palpation of chest normal, lungs clear to auscultation   CV:  regular rate and rhythm, no murmur, rub, or gallop, +2 pedal pulses  M/S:   Edema noted of right elbow. No edema of right hand with normal  strength.   SKIN:  Inspection of skin and subcutaneous tissue baseline    Labs done in SNF are in [place] EPIC. Please refer to them using EPIC/Care Everywhere.    Assessment/Plan:  Other closed displaced fracture of proximal end  of right humerus with routine healing, subsequent encounter  NWB with sling/immobilizer for 6-12 weeks. PT/OT for rehabilitation.   Analgesia with dilaudid 4 mg every 3 hours as needed and methocarbamol 500 mg TID. Will attempt to wean after follow-up with ortho scheduled on 10/22/24.   Encourage elevation of right arm and ice throughout the day for additional pain control.      Type 2 diabetes, HbA1c goal < 7% (H)  BG monitoring QID. BG's have been between 146-252 in last 2 days.   Continue glipizide 10 mg BID, metformin 1000 mg BID, and Lantus 20 units daily at bedtime.   10/14/24: Alc dropped to 7.8 from 11.1 on 8/27/24.      Coronary artery disease involving native heart without angina pectoris, unspecified vessel or lesion type  Hx of CABG  Essential hypertension  Hyperlipidemia LDL goal <70  Afib  Stents placed in 1992, 1997, 1998, 1999, 2008, 2011. Last EKG on 11/5/24 was NSR. Per patient, cardiologist took him off aspirin years ago.   SBP's ranging from 108 to 140 and DBP's between 59 and 80.   Continue jardiance 25 mg daily, metoprolol 50 mg BID and rosuvastatin 40 mg daily.         Gastroesophageal reflux disease without esophagitis  Continue omeprazole 20 mg daily.         Orders:  None        Electronically signed by:  RACIEL Santos CNP

## 2024-10-23 NOTE — LETTER
" 10/23/2024      Genaro Barbosa  [address]  [place] [address]        [facility] GERIATRICS    Chief Complaint   Patient presents with     RECHECK     HPI:  Genaro Barbosa is a 74 year old  (1950), who is being seen today for an episodic care visit [facility] [[id number]]. Today's concern is: Patient reports that his whole body was aching this morning, but he tested negative for Covid and he is feeling better this afternoon. Patient says that he needs the narcotic pain medication at night but he is able to wait 6 hours between doses during the day. He has pain with abduction of his right arm but he should be NWB of his right arm and I encouraged him to wear his sling as a reminder to not move it. Patient very concerned about possible needing to discharge on 10/26/24, however, it is being appealed. Patient says he doesn't not feel \"steady\" with walking and he doesn't want to hurt himself by going home and trying to help his disabled wife. His son and granddaughter are currently taking care of his wife.     Allergies, and PMH/PSH reviewed in EPIC today.  REVIEW OF SYSTEMS:  4 point ROS including Respiratory, CV, GI and , other than that noted in the HPI,  is negative    Objective:   /75   Pulse 89   Temp 97.7  F (36.5  C)   Resp 18   Ht 1.829 m (6')   Wt 79.4 kg (175 lb)   SpO2 98%   BMI 23.73 kg/m    GENERAL APPEARANCE:  Alert, in no distress  RESP:  respiratory effort and palpation of chest normal, lungs clear to auscultation   CV:  regular rate and rhythm, no murmur, rub, or gallop, no edema  ABDOMEN:  normal bowel sounds, soft, nontender, no hepatosplenomegaly or other masses  M/S:   limited assistance of 1 with transfer and mobility, right arm well perfused with normal hand strength and ROM.   SKIN:  wound healing well, no signs of infection with scar extending from right elbow to shoulder.   PSYCH:  oriented X 3, affect and mood " "normal    Labs done in SNF are in [place] EPIC. Please refer to them using EPIC/Care Everywhere.    Assessment/Plan:  Other closed displaced fracture of proximal end of right humerus with routine healing, subsequent encounter  NWB with sling/immobilizer for 6-12 weeks. PT/OT for rehabilitation.   Decrease  from dilaudid 4 mg every 3 hours as needed to 4 mg every 4 hours as needed; maximum 4 doses daily, and methocarbamol 500 mg TID. Follow-up with ortho on 10/22/24 and 40 sutures were removed. X-ray on 10/22, \"Intact hardware with slight movement of fracture without significant internal healing.   Encourage use of sling, elevation of right arm and ice throughout the day for additional pain control.      Type 2 diabetes, HbA1c goal < 7% (H)  BG monitoring QID. BG's have been between 146-252 in last 2 days.   Continue glipizide 10 mg BID, metformin 1000 mg BID, and Lantus 20 units daily at bedtime.   10/14/24: Alc dropped to 7.8 from 11.1 on 8/27/24.      Coronary artery disease involving native heart without angina pectoris, unspecified vessel or lesion type  Hx of CABG  Essential hypertension  Hyperlipidemia LDL goal <70  Afib  Stents placed in 1992, 1997, 1998, 1999, 2008, 2011. Last EKG on 11/5/24 was NSR. Per patient, cardiologist took him off aspirin years ago.   SBP's ranging from 108 to 140 and DBP's between 59 and 80.   Continue jardiance 25 mg daily, metoprolol 50 mg BID and rosuvastatin 40 mg daily.      Gastroesophageal reflux disease without esophagitis  Continue omeprazole 20 mg daily.       Orders:  Decrease Dilaudid to 4 mg every 4 hours as needed. Max 4 doses/day.       Electronically signed by: RACIEL Santos CNP           Sincerely,        RACIEL Santos CNP      "

## 2024-10-25 VITALS
WEIGHT: 175 LBS | OXYGEN SATURATION: 98 % | BODY MASS INDEX: 23.7 KG/M2 | HEIGHT: 72 IN | DIASTOLIC BLOOD PRESSURE: 75 MMHG | HEART RATE: 89 BPM | RESPIRATION RATE: 18 BRPM | TEMPERATURE: 97.7 F | SYSTOLIC BLOOD PRESSURE: 124 MMHG

## 2024-10-25 DIAGNOSIS — S42.291D OTHER CLOSED DISPLACED FRACTURE OF PROXIMAL END OF RIGHT HUMERUS WITH ROUTINE HEALING, SUBSEQUENT ENCOUNTER: ICD-10-CM

## 2024-10-25 RX ORDER — HYDROMORPHONE HYDROCHLORIDE 4 MG/1
4 TABLET ORAL EVERY 4 HOURS PRN
Qty: 40 TABLET | Refills: 0 | Status: SHIPPED | OUTPATIENT
Start: 2024-10-25 | End: 2024-11-04

## 2024-10-25 NOTE — TELEPHONE ENCOUNTER
Orders Placed This Encounter   Medications    HYDROmorphone (DILAUDID) 4 MG tablet     Sig: Take 1 tablet (4 mg) by mouth every 4 hours as needed for severe pain (Max 4 tabs per day.).     Dispense:  40 tablet     Refill:  0     RACIEL Santos CNP

## 2024-10-25 NOTE — PROGRESS NOTES
"[facility] GERIATRICS    Chief Complaint   Patient presents with    RECHECK     HPI:  Genaro Barbosa is a 74 year old  (1950), who is being seen today for an episodic care visit at: [facility] [[id number]]. Today's concern is: Patient reports that his whole body was aching this morning, but he tested negative for Covid and he is feeling better this afternoon. Patient says that he needs the narcotic pain medication at night but he is able to wait 6 hours between doses during the day. He has pain with abduction of his right arm but he should be NWB of his right arm and I encouraged him to wear his sling as a reminder to not move it. Patient very concerned about possible needing to discharge on 10/26/24, however, it is being appealed. Patient says he doesn't not feel \"steady\" with walking and he doesn't want to hurt himself by going home and trying to help his disabled wife. His son and granddaughter are currently taking care of his wife.     Allergies, and PMH/PSH reviewed in EPIC today.  REVIEW OF SYSTEMS:  4 point ROS including Respiratory, CV, GI and , other than that noted in the HPI,  is negative    Objective:   /75   Pulse 89   Temp 97.7  F (36.5  C)   Resp 18   Ht 1.829 m (6')   Wt 79.4 kg (175 lb)   SpO2 98%   BMI 23.73 kg/m    GENERAL APPEARANCE:  Alert, in no distress  RESP:  respiratory effort and palpation of chest normal, lungs clear to auscultation   CV:  regular rate and rhythm, no murmur, rub, or gallop, no edema  ABDOMEN:  normal bowel sounds, soft, nontender, no hepatosplenomegaly or other masses  M/S:   limited assistance of 1 with transfer and mobility, right arm well perfused with normal hand strength and ROM.   SKIN:  wound healing well, no signs of infection with scar extending from right elbow to shoulder.   PSYCH:  oriented X 3, affect and mood normal    Labs done in SNF are in [place] EPIC. Please refer to them using [facility]/Care " "Everywhere.    Assessment/Plan:  Other closed displaced fracture of proximal end of right humerus with routine healing, subsequent encounter  NWB with sling/immobilizer for 6-12 weeks. PT/OT for rehabilitation.   Decrease  from dilaudid 4 mg every 3 hours as needed to 4 mg every 4 hours as needed; maximum 4 doses daily, and methocarbamol 500 mg TID. Follow-up with ortho on 10/22/24 and 40 sutures were removed. X-ray on 10/22, \"Intact hardware with slight movement of fracture without significant internal healing.   Encourage use of sling, elevation of right arm and ice throughout the day for additional pain control.      Type 2 diabetes, HbA1c goal < 7% (H)  BG monitoring QID. BG's have been between 146-252 in last 2 days.   Continue glipizide 10 mg BID, metformin 1000 mg BID, and Lantus 20 units daily at bedtime.   10/14/24: Alc dropped to 7.8 from 11.1 on 8/27/24.      Coronary artery disease involving native heart without angina pectoris, unspecified vessel or lesion type  Hx of CABG  Essential hypertension  Hyperlipidemia LDL goal <70  Afib  Stents placed in 1992, 1997, 1998, 1999, 2008, 2011. Last EKG on 11/5/24 was NSR. Per patient, cardiologist took him off aspirin years ago.   SBP's ranging from 108 to 140 and DBP's between 59 and 80.   Continue jardiance 25 mg daily, metoprolol 50 mg BID and rosuvastatin 40 mg daily.      Gastroesophageal reflux disease without esophagitis  Continue omeprazole 20 mg daily.       Orders:  Decrease Dilaudid to 4 mg every 4 hours as needed. Max 4 doses/day.       [facility] GERIATRICS DISCHARGE SUMMARY  PATIENT'S NAME: Genaro Barbosa  YOB: 1950  MEDICAL RECORD NUMBER:  [id number]  Place of Service where encounter took place:  [facility] ([facility]) [[id number]]    PRIMARY CARE PROVIDER AND CLINIC RESPONSIBLE AFTER TRANSFER:   Rigo Rivas PA-C, [address] [facility] [address]     Home to apartment in [place] with wife.   "     Transferring providers: RACIEL Santos CNP, Moraima Mullins MD  Recent Hospitalization/ED:  [facility] Hospital stay 9/29/24 to 10/9/24.  Date of SNF Admission:  10/9/24  Date of SNF (anticipated) Discharge:  10/31/24  Discharged to: home  Cognitive Scores: SLUMS: 22/30  Physical Function:  independent ambulation  DME: No DME needed    CODE STATUS/ADVANCE DIRECTIVES DISCUSSION:  Full Code   ALLERGIES: Ace inhibitors, Contrast dye, and Oxycodone    NURSING FACILITY COURSE   Medication Changes/Rationale:   Wean off dilaudid with fracture healing. Patient was non-compliant with non-weight bearing but is now wearing a sling to prevent movement.       Discharge Medications:  Post Medication Reconciliation Status:  Discharge medications reconciled, continue medications without change     Controlled medications:   Medication: Dilaudid , remaining tabs given to patient at the time of discharge to take home     Past Medical History:   Past Medical History:   Diagnosis Date    Coronary artery disease     stents x8    Diabetic eye exam (H) 10/11/12    [facility]    Fatty liver     Gallstones 2/1/11    hospitalized with RUQ pain    History of tobacco use: 1966 - 1976, 1/2 ppd 10/3/2011    Hyperlipidemia LDL goal < 100     Hypertension goal BP (blood pressure) < 130/80     Pyloric stenosis, congenital (H)     Type 2 diabetes, HbA1c goal < 7% (H)      SNF labs: Labs done in SNF are in [place] EPIC. Please refer to them using [place]/Care Everywhere.    DISCHARGE PLAN:  Follow up labs: No labs orders/due  Medical Follow Up:      Follow up with primary care provider in 1-2 weeks  Current [place] scheduled appointments:  Appointments in Next Year      Nov 07, 2024 11:30 AM  (Arrive by 11:10 AM)  Provider Visit with Rigo Rivas PA-C  [facility] ([facility] ) [phone]           Discharge Services: Home Care:  Occupational Therapy, Physical Therapy, and  Home Health Aide  Discharge Instructions Verbalized to Patient at Discharge:   Weight bearing restrictions:  Non-weight bearing of right arm.     TOTAL DISCHARGE TIME:   Greater than 30 minutes  Electronically signed by:  RACIEL Santos CNP     Documentation of Face to Face and Certification for Home Health Services    I certify that patient: Genaro Barbosa is under my care and that I, or a nurse practitioner or physician's assistant working with me, had a face-to-face encounter that meets the physician face-to-face encounter requirements with this patient on: 10/23/2024.    This encounter with the patient was in whole, or in part, for the following medical condition, which is the primary reason for home health care: Other closed displaced fracture of proximal end of right humerus with routine healing, subsequent encounter, M62.81 Muscle weakness (generalized).    I certify that, based on my findings, the following services are medically necessary home health services: Occupational Therapy, Physical Therapy, and HHA .    My clinical findings support the need for the above services because: Occupational Therapy Services are needed to assess and treat cognitive ability and address ADL safety due to impairment in function of right arm. and Physical Therapy Services are needed to assess and treat the following functional impairments: muscle weakness s/p right humerus fracture.    Further, I certify that my clinical findings support that this patient is homebound (i.e. absences from home require considerable and taxing effort and are for medical reasons or [employer] services or infrequently or of short duration when for other reasons) because: Requires assistance of another person or specialized equipment to access medical services because patient: Range of motion limitations prevents ability to exit home safely. and Requires supervision of another for safe transfer...    Based on the above findings. I certify that  this patient is confined to the home and needs intermittent skilled nursing care, physical therapy and/or speech therapy.  The patient is under my care, and I have initiated the establishment of the plan of care.  This patient will be followed by a physician who will periodically review the plan of care.  Physician/Provider to provide follow up care: Rigo Rivas    Attending hospital physician (the Medicare certified [facility] provider): RACIEL Santos CNP  Physician Signature: See electronic signature associated with these discharge orders.  Date: 10/31/2024          Electronically signed by: RACIEL Santos CNP

## 2024-10-30 ENCOUNTER — TELEPHONE (OUTPATIENT)
Dept: FAMILY MEDICINE | Facility: OTHER | Age: 74
End: 2024-10-30
Payer: COMMERCIAL

## 2024-10-30 ENCOUNTER — PATIENT OUTREACH (OUTPATIENT)
Dept: CARE COORDINATION | Facility: CLINIC | Age: 74
End: 2024-10-30
Payer: COMMERCIAL

## 2024-10-30 NOTE — PROGRESS NOTES
Clinic Care Coordination Contact  Care Team Conversations    Upon chart review, a discharge summary from [facility] was noted from 10/23/24, however, also noted the discharge was being appealed.    Writer contacted [facility] [phone] and was informed patient is still in TCU.    RN Care Coordinator will await notification from TCU informing RN Care Coordinator of patients discharge plans/needs. RN Care Coordinator will review chart and outreach to TCU every 4 weeks.     Melissa Behl BSN, RN, PHN, [facility]  RN Clinical Product Navigator  [phone]

## 2024-10-30 NOTE — TELEPHONE ENCOUNTER
Patient Returning Call    Reason for call:  Jiff would like to make sure PT is ok to continue care and would like Provider to sign off .    Information relayed to patient:   n/a     Patient has additional questions:  No        Could we send this information to you in [facility] or would you prefer to receive a phone call?:   Patient would prefer a phone call   Okay to leave a detailed message?: Yes at Other phone number:  Gayla  [phone]  Jiff

## 2024-10-31 NOTE — TELEPHONE ENCOUNTER
I approve of continued PT for this patient.     Thanks,  Rigo Rivas PA-C on 10/31/2024 at 11:01 AM

## 2024-11-01 ENCOUNTER — PATIENT OUTREACH (OUTPATIENT)
Dept: CARE COORDINATION | Facility: CLINIC | Age: 74
End: 2024-11-01
Payer: COMMERCIAL

## 2024-11-01 NOTE — PROGRESS NOTES
Clinic Care Coordination Contact  Care Team Conversations  Writer was updated by OBEY MORFIN that patient discharged home on 10/31/24 with home care services through [facility] RN, PT, OT, HHA.    UT/Voicemail    Clinical Data: Care Coordinator Outreach    Outreach Documentation Number of Outreach Attempt   11/1/2024  10:50 AM 1       Left message on patient's voicemail with call back information and requested return call.      Plan: Care Coordinator will try to reach patient again in 1-2 business days.    Melissa Behl BSN, RN, PHN, [facility]  RN Clinical Product Navigator  [phone]

## 2024-11-01 NOTE — LETTER
[facility] CARE COORDINATION  [address]  [place] [address]   November 4, 2024    Genaro Barbosa  [address]   [place] [address]      Dear Genaro,    I am a clinical product navigator that works on behalf of [facility]; I wanted to introduce myself and role to you, as I can help you establish care and follow-up with recommended providers for ongoing care within our health care system.     This role serves as a liaison between [facility]'s clinical network and the health insurance plans to provide guidance on establishing primary and specialty care needs. One of the benefits of having a primary care provider from the network is that your care team will help coordinate your care and guide you through any additional care you may need. Our care team is focused and trained to treat the whole person and can help you with all your physical, emotional, and social concerns.     Also, our Primary Care Clinics are all supported by Clinic Care Coordination:     The clinic care coordination team is made up of a registered nurse, , financial resource worker and community health worker who understand the health care system. The goal of clinic care coordination is to help you manage your health and improve access to the health care system. Our team works alongside your provider to assist you in determining your health and social needs. We can help you obtain health care and community resources, providing you with necessary information and education. We can work with you through any barriers and develop a care plan that helps coordinate and strengthen the communication between you and your care team.  Our services are voluntary and are offered without charge to you personally.    Please feel free to contact me with any questions or concerns regarding care coordination and what we can offer.      We are focused on providing you with the highest-quality healthcare experience  possible.    Sincerely,       GABBI Cuevas Clinical Navigator  [phone]

## 2024-11-04 ENCOUNTER — MEDICAL CORRESPONDENCE (OUTPATIENT)
Dept: HEALTH INFORMATION MANAGEMENT | Facility: CLINIC | Age: 74
End: 2024-11-04

## 2024-11-04 DIAGNOSIS — Z09 HOSPITAL DISCHARGE FOLLOW-UP: ICD-10-CM

## 2024-11-04 NOTE — PROGRESS NOTES
Clinic Care Coordination Contact  [facility]    Clinical Data: Care Coordinator Outreach    Outreach Documentation Number of Outreach Attempt   11/1/2024  10:50 AM 1   11/4/2024  11:33 AM 2       Unable to leave a message due to: answering machine picked up, then the call was picked up and hung up on writer.      Plan: Care Coordinator will send care coordination introduction letter with care coordinator contact information and explanation of care coordination services via [facility] [employer] [facility]. Care Coordinator will do no further outreaches at this time.    Melissa Behl BSN, RN, PHN, [facility]  RN Clinical Product Navigator  [phone]

## 2024-11-05 ENCOUNTER — TELEPHONE (OUTPATIENT)
Dept: FAMILY MEDICINE | Facility: OTHER | Age: 74
End: 2024-11-05
Payer: COMMERCIAL

## 2024-11-05 NOTE — TELEPHONE ENCOUNTER
MTM referral from: Transitions of Care (recent hospital discharge, TCU discharge, or ED visit)    MT referral outreach attempt #1 on November 5, 2024 at 10:53 AM      Outcome: Spoke with patient had no idea what the referral was for, and said he's  not interested    Use [facility] med adv for the carrier/Plan on the flowsheet          Izabel Aceves MT   [phone]

## 2024-11-12 ENCOUNTER — TELEPHONE (OUTPATIENT)
Dept: FAMILY MEDICINE | Facility: OTHER | Age: 74
End: 2024-11-12
Payer: COMMERCIAL

## 2024-11-12 NOTE — TELEPHONE ENCOUNTER
INCOMING FORMS    Sender: [place] [facility]     Type of Form, letter or note (What is requested?): HHC/POC and Order    How was the form received?: Fax    How should forms be returned?:  Fax : [phone]    Form placed in JR bin for review/signature if appropriate.

## 2024-11-13 ENCOUNTER — MEDICAL CORRESPONDENCE (OUTPATIENT)
Dept: HEALTH INFORMATION MANAGEMENT | Facility: CLINIC | Age: 74
End: 2024-11-13
Payer: COMMERCIAL

## 2024-11-13 NOTE — TELEPHONE ENCOUNTER
Placed in MA task box tomorrow morning, 11/14/24.    Thanks,  Rigo Rivas PA-C on 11/13/2024 at 4:19 PM

## 2024-11-21 DIAGNOSIS — Z53.9 DIAGNOSIS NOT YET DEFINED: Primary | ICD-10-CM

## 2024-11-22 ENCOUNTER — TELEPHONE (OUTPATIENT)
Dept: FAMILY MEDICINE | Facility: OTHER | Age: 74
End: 2024-11-22
Payer: COMMERCIAL

## 2024-11-22 NOTE — TELEPHONE ENCOUNTER
INCOMING FORMS    Sender: [place] [facility]     Type of Form, letter or note (What is requested?): order    How was the form received?: Fax    How should forms be returned?:  Fax : [phone]    Form placed in JR bin for review/signature if appropriate.

## 2024-11-24 ENCOUNTER — MEDICAL CORRESPONDENCE (OUTPATIENT)
Dept: HEALTH INFORMATION MANAGEMENT | Facility: CLINIC | Age: 74
End: 2024-11-24
Payer: COMMERCIAL

## 2024-12-02 ENCOUNTER — MEDICAL CORRESPONDENCE (OUTPATIENT)
Dept: HEALTH INFORMATION MANAGEMENT | Facility: CLINIC | Age: 74
End: 2024-12-02
Payer: COMMERCIAL

## 2024-12-03 ENCOUNTER — TELEPHONE (OUTPATIENT)
Dept: FAMILY MEDICINE | Facility: OTHER | Age: 74
End: 2024-12-03
Payer: COMMERCIAL

## 2024-12-03 NOTE — TELEPHONE ENCOUNTER
INCOMING FORMS    Sender: [facility].     Type of Form, letter or note (What is requested?): order    How was the form received?: Fax    How should forms be returned?:  Fax : [phone]    Form placed in JR bin for review/signature if appropriate.

## 2024-12-04 ENCOUNTER — TELEPHONE (OUTPATIENT)
Dept: FAMILY MEDICINE | Facility: OTHER | Age: 74
End: 2024-12-04
Payer: COMMERCIAL

## 2024-12-04 NOTE — TELEPHONE ENCOUNTER
INCOMING FORMS    Sender: [place] [facility]     Type of Form, letter or note (What is requested?): order    How was the form received?: Fax    How should forms be returned?:  Fax : [phone]    Form placed in JR bin for review/signature if appropriate.

## 2024-12-06 ENCOUNTER — MEDICAL CORRESPONDENCE (OUTPATIENT)
Dept: HEALTH INFORMATION MANAGEMENT | Facility: CLINIC | Age: 74
End: 2024-12-06
Payer: COMMERCIAL

## 2024-12-09 ENCOUNTER — MEDICAL CORRESPONDENCE (OUTPATIENT)
Dept: HEALTH INFORMATION MANAGEMENT | Facility: CLINIC | Age: 74
End: 2024-12-09
Payer: COMMERCIAL

## 2024-12-09 ENCOUNTER — NURSE TRIAGE (OUTPATIENT)
Dept: FAMILY MEDICINE | Facility: OTHER | Age: 74
End: 2024-12-09
Payer: COMMERCIAL

## 2024-12-09 NOTE — TELEPHONE ENCOUNTER
S: Headaches.     B: Patient is calling in regarding headache's. He said he has had a headache off and on for 6 days. It comes and goes. He is taking tylenol for it many times throughout a day.  Denies nausea or vomiting. Denies vision changes, double or blurred vision. Denies fever.     A: Advised to be seen today or tomorrow per protocol. RN reviewed red flag symptoms with patient and when to seek emergency care.     R: Patient verbalized understanding and is agreeable. Patient scheduled tomorrow. Patient denies any other questions or concerns at this time.       Reason for Disposition   MODERATE headache (e.g., interferes with normal activities) present > 24 hours and unexplained    Additional Information   Negative: Difficult to awaken or acting confused (e.g., disoriented, slurred speech)   Negative: Weakness of the face, arm or leg on one side of the body and new-onset   Negative: Numbness of the face, arm or leg on one side of the body and new-onset   Negative: Loss of speech or garbled speech and new-onset   Negative: Passed out (e.g., fainted, lost consciousness, blacked out and was not responding)   Negative: Sounds like a life-threatening emergency to the triager   Negative: Followed a head injury within last 3 days   Negative: Traumatic Brain Injury (TBI) is suspected   Negative: Sinus pain or congestion is main symptom(s)   Negative: Influenza suspected (i.e., cough, fever, other respiratory symptoms; probable influenza exposure)   Negative: Pregnant   Negative: Unable to walk without falling   Negative: Stiff neck (can't touch chin to chest)   Negative: Other family members (or people in same household) with headaches and possibility of carbon monoxide exposure   Negative: SEVERE headache, states 'worst headache' of life   Negative: SEVERE headache, sudden-onset (i.e., reaching maximum intensity within seconds to 1 hour)   Negative: Severe pain in one eye   Negative: Loss of vision or double vision  (Exception: Same as previously diagnosed migraines.)   Negative: Patient sounds very sick or weak to the triager   Negative: Fever > 103 F (39.4 C)   Negative: Fever > 100 F (37.8 C) and has diabetes mellitus or a weak immune system (e.g., HIV positive, cancer chemotherapy, organ transplant, splenectomy, chronic steroids)   Negative: SEVERE headache (e.g., excruciating) and has had severe headaches before   Negative: SEVERE headache and not relieved by pain meds   Negative: SEVERE headache and vomiting   Negative: SEVERE headache and fever   Negative: New-onset headache and weak immune system (e.g., HIV positive, cancer chemo, splenectomy, organ transplant, chronic steroids)   Negative: Fever present > 3 days (72 hours)   Negative: Patient wants to be seen    Protocols used: Headache-A-OH

## 2024-12-09 NOTE — TELEPHONE ENCOUNTER
Faxed and sent to scanning.   Consent 2/Introductory Paragraph: Mohs surgery was explained to the patient and consent was obtained. The risks, benefits and alternatives to therapy were discussed in detail. Specifically, the risks of infection, scarring, bleeding, prolonged wound healing, incomplete removal, allergy to anesthesia, nerve injury and recurrence were addressed. Prior to the procedure, the treatment site was clearly identified and confirmed by the patient. All components of Universal Protocol/PAUSE Rule completed.

## 2024-12-10 ENCOUNTER — OFFICE VISIT (OUTPATIENT)
Dept: FAMILY MEDICINE | Facility: OTHER | Age: 74
End: 2024-12-10
Payer: COMMERCIAL

## 2024-12-10 VITALS
HEART RATE: 69 BPM | WEIGHT: 183 LBS | HEIGHT: 72 IN | OXYGEN SATURATION: 98 % | DIASTOLIC BLOOD PRESSURE: 76 MMHG | BODY MASS INDEX: 24.79 KG/M2 | TEMPERATURE: 97.9 F | SYSTOLIC BLOOD PRESSURE: 124 MMHG | RESPIRATION RATE: 18 BRPM

## 2024-12-10 DIAGNOSIS — R51.9 SINUS HEADACHE: ICD-10-CM

## 2024-12-10 DIAGNOSIS — E11.9 TYPE 2 DIABETES, HBA1C GOAL < 7% (H): Primary | ICD-10-CM

## 2024-12-10 DIAGNOSIS — Z29.11 NEED FOR VACCINATION AGAINST RESPIRATORY SYNCYTIAL VIRUS: ICD-10-CM

## 2024-12-10 DIAGNOSIS — E78.5 HYPERLIPIDEMIA LDL GOAL <70: ICD-10-CM

## 2024-12-10 LAB
ALBUMIN SERPL BCG-MCNC: 4 G/DL (ref 3.5–5.2)
ALP SERPL-CCNC: 138 U/L (ref 40–150)
ALT SERPL W P-5'-P-CCNC: 10 U/L (ref 0–70)
ANION GAP SERPL CALCULATED.3IONS-SCNC: 12 MMOL/L (ref 7–15)
AST SERPL W P-5'-P-CCNC: 13 U/L (ref 0–45)
BILIRUB SERPL-MCNC: 0.5 MG/DL
BUN SERPL-MCNC: 13.9 MG/DL (ref 8–23)
CALCIUM SERPL-MCNC: 9.5 MG/DL (ref 8.8–10.4)
CHLORIDE SERPL-SCNC: 102 MMOL/L (ref 98–107)
CREAT SERPL-MCNC: 0.88 MG/DL (ref 0.67–1.17)
EGFRCR SERPLBLD CKD-EPI 2021: 89 ML/MIN/1.73M2
EST. AVERAGE GLUCOSE BLD GHB EST-MCNC: 194 MG/DL
GLUCOSE SERPL-MCNC: 198 MG/DL (ref 70–99)
HBA1C MFR BLD: 8.4 % (ref 0–5.6)
HCO3 SERPL-SCNC: 23 MMOL/L (ref 22–29)
POTASSIUM SERPL-SCNC: 4.6 MMOL/L (ref 3.4–5.3)
PROT SERPL-MCNC: 7.7 G/DL (ref 6.4–8.3)
SODIUM SERPL-SCNC: 137 MMOL/L (ref 135–145)

## 2024-12-10 PROCEDURE — 99214 OFFICE O/P EST MOD 30 MIN: CPT | Performed by: PHYSICIAN ASSISTANT

## 2024-12-10 PROCEDURE — 80053 COMPREHEN METABOLIC PANEL: CPT | Performed by: PHYSICIAN ASSISTANT

## 2024-12-10 PROCEDURE — 36415 COLL VENOUS BLD VENIPUNCTURE: CPT | Performed by: PHYSICIAN ASSISTANT

## 2024-12-10 PROCEDURE — 83036 HEMOGLOBIN GLYCOSYLATED A1C: CPT | Performed by: PHYSICIAN ASSISTANT

## 2024-12-10 RX ORDER — AMOXICILLIN 500 MG/1
500 CAPSULE ORAL 2 TIMES DAILY
Qty: 10 CAPSULE | Refills: 0 | Status: SHIPPED | OUTPATIENT
Start: 2024-12-10

## 2024-12-10 ASSESSMENT — ENCOUNTER SYMPTOMS: HEADACHES: 1

## 2024-12-10 NOTE — PROGRESS NOTES
Assessment & Plan     Type 2 diabetes, HbA1c goal < 7% (H)  Patient does not have a very good understanding of how to limit carbs. He reports that he has been consuming alcohol to the point of inebriation, eating candy as he was concerned that sugars were too low. While he recently had A1c checked, I opted to repeat this today given the history he provided. Unfortunately, A1c returned elevated today at 8.4% which is up from previous check in Oct 2024. I instructed him to stop consuming candy and similar high sugary foods and beverages. Patient was also instructed to limit alcoholic beverages to 1/day or less. Next follow up recommended in 3 months.   - Hemoglobin A1c; Future  - Hemoglobin A1c    Hyperlipidemia LDL goal <70  The 10-year ASCVD risk score (Gena FELIX, et al., 2019) is: 49.5%    Values used to calculate the score:      Age: 74 years      Sex: Male      Is Non- : No      Diabetic: Yes      Tobacco smoker: No      Systolic Blood Pressure: 124 mmHg      Is BP treated: Yes      HDL Cholesterol: 56 mg/dL      Total Cholesterol: 262 mg/dL  Patient is currently taking 40mg of crestor. Will continue to monitor lipids at future visits. Patient instructed on importance of healthy living.     Sinus headache  Patient reports persistent headache for the past 6 weeks. He reports the headache is localized to the frontal sinus region and worsens when he lays down. He has been managing this with OTC analgesics, but has been unable to find relief. Nasal congestion, erythematous throat and sinus tenderness to percussion noted on exam. Given the timeframe, I am comfortable with the patient completing short course of antibiotic to treat for possible infection. He will keep me updated if this does not improve as expected.  - amoxicillin (AMOXIL) 500 MG capsule; Take 1 capsule (500 mg) by mouth 2 times daily.  - Comprehensive metabolic panel (BMP + Alb, Alk Phos, ALT, AST, Total. Bili, TP); Future  -  "Comprehensive metabolic panel (BMP + Alb, Alk Phos, ALT, AST, Total. Bili, TP)    Need for vaccination against respiratory syncytial virus  Sent to the pharmacy.   - RSV vaccine, bivalent, ABRYSVO, injection; Inject 0.5 mLs into the muscle once for 1 dose. Pharmacist administered    Subjective   Genaro is a 74 year old, presenting for the following health issues:  Headache and Check arm        12/10/2024    10:46 AM   Additional Questions   Roomed by Angela SHEARER   Accompanied by self     History of Present Illness       Headaches:   Since the patient's last clinic visit, headaches are: worsened  The patient is getting headaches:  Everyday - when he lays down to go to bed at night that is when they kick in  He is not able to do normal daily activities when he has a migraine.  The patient is taking the following rescue/relief medications:  Tylenol and Excedrin   Patient states \"The relief is inconsistent\" from the rescue/relief medications.   The patient is taking the following medications to prevent migraines:  No medications to prevent migraines  In the past 4 weeks, the patient has gone to an Urgent Care or Emergency Room 0 times times due to headaches.   He is taking medications regularly.     6 weeks now he has had the headache for, it is not constant throughout the day, typically comes on at night when he is getting ready to go to bed. He has been taking 1500 MG of Tylenol per day and 3 tablets of Excedrin at night and those seem to be helping with the headache. He feels like maybe the headache is sinus related, he has been spraying some nasal spray in his nose which might be helping but now he ran out. There is one sinus spray that says it shortens colds on it (maybe Zicam spray). He also reports that he has never struggled with headaches/migraines before.     Also had surgery on his right upper extremity and he just wants you to take a look at his wound and just make sure that he is healing correctly and doesn't have " any infection present. He had broken his arm at a bar during a football game and had to have surgery.           Review of Systems  Constitutional, HEENT, cardiovascular, pulmonary, gi and gu systems are negative, except as otherwise noted.      Objective    /76   Pulse 69   Temp 97.9  F (36.6  C) (Temporal)   Resp 18   Ht 1.829 m (6')   Wt 83 kg (183 lb)   SpO2 98%   BMI 24.82 kg/m    Body mass index is 24.82 kg/m .  Physical Exam   GENERAL: alert and no distress  HENT: normal cephalic/atraumatic, ear canals and TM's normal, nasal mucosa edematous , oral mucous membranes moist, tonsillar erythema, and sinuses: frontal tenderness on bilateral  NECK: no adenopathy, no asymmetry, masses, or scars  RESP: lungs clear to auscultation - no rales, rhonchi or wheezes  CV: regular rate and rhythm, normal S1 S2, no S3 or S4, no murmur, click or rub, no peripheral edema  MS: no gross musculoskeletal defects noted, no edema  SKIN: long well healed incisional scar along the medial right upper arm. Small area of erythema and scabbing at the proximal end of this scar.   PSYCH: mentation appears normal, affect normal/bright    Results for orders placed or performed in visit on 12/10/24   Hemoglobin A1c     Status: Abnormal   Result Value Ref Range    Estimated Average Glucose 194 (H) <117 mg/dL    Hemoglobin A1C 8.4 (H) 0.0 - 5.6 %    Narrative    Results consistent with previous, repeat testing unnecessary             Signed Electronically by: Rigo Rivas PA-C

## 2025-02-01 DIAGNOSIS — E11.9 TYPE 2 DIABETES, HBA1C GOAL < 7% (H): ICD-10-CM

## 2025-02-03 RX ORDER — INSULIN GLARGINE-YFGN 100 [IU]/ML
20 INJECTION, SOLUTION SUBCUTANEOUS AT BEDTIME
Qty: 12 ML | Refills: 0 | Status: SHIPPED | OUTPATIENT
Start: 2025-02-03

## 2025-02-03 NOTE — TELEPHONE ENCOUNTER
Clinic RN: Please investigate patient's chart or contact patient if the information cannot be found because the medication is listed as historical or discontinued. Confirm patient is taking this medication. Document findings and route refill encounter to provider for approval or denial.    Alexus Ozuna, GLORIAN, RN

## 2025-02-18 ENCOUNTER — PATIENT OUTREACH (OUTPATIENT)
Dept: CARE COORDINATION | Facility: CLINIC | Age: 75
End: 2025-02-18
Payer: COMMERCIAL

## 2025-02-19 DIAGNOSIS — I10 HTN, GOAL BELOW 140/90: ICD-10-CM

## 2025-02-19 RX ORDER — METOPROLOL TARTRATE 50 MG
50 TABLET ORAL 2 TIMES DAILY
Qty: 180 TABLET | Refills: 2 | Status: SHIPPED | OUTPATIENT
Start: 2025-02-19

## 2025-03-11 ENCOUNTER — OFFICE VISIT (OUTPATIENT)
Dept: FAMILY MEDICINE | Facility: OTHER | Age: 75
End: 2025-03-11
Payer: COMMERCIAL

## 2025-03-11 VITALS
DIASTOLIC BLOOD PRESSURE: 86 MMHG | WEIGHT: 187.5 LBS | TEMPERATURE: 97.2 F | BODY MASS INDEX: 25.4 KG/M2 | SYSTOLIC BLOOD PRESSURE: 151 MMHG | HEIGHT: 72 IN | OXYGEN SATURATION: 96 % | RESPIRATION RATE: 15 BRPM | HEART RATE: 74 BPM

## 2025-03-11 DIAGNOSIS — I48.91 ATRIAL FIBRILLATION, UNSPECIFIED TYPE (H): ICD-10-CM

## 2025-03-11 DIAGNOSIS — M65.30 TRIGGER FINGER, ACQUIRED: ICD-10-CM

## 2025-03-11 DIAGNOSIS — N18.31 CHRONIC KIDNEY DISEASE, STAGE 3A (H): ICD-10-CM

## 2025-03-11 DIAGNOSIS — E78.5 HYPERLIPIDEMIA LDL GOAL <70: ICD-10-CM

## 2025-03-11 DIAGNOSIS — E11.59 TYPE 2 DIABETES MELLITUS WITH OTHER CIRCULATORY COMPLICATION, WITHOUT LONG-TERM CURRENT USE OF INSULIN (H): Primary | ICD-10-CM

## 2025-03-11 LAB
CHOLEST SERPL-MCNC: 253 MG/DL
EST. AVERAGE GLUCOSE BLD GHB EST-MCNC: 269 MG/DL
FASTING STATUS PATIENT QL REPORTED: NO
HBA1C MFR BLD: 11 % (ref 0–5.6)
HDLC SERPL-MCNC: 50 MG/DL
LDLC SERPL CALC-MCNC: 177 MG/DL
NONHDLC SERPL-MCNC: 203 MG/DL
TRIGL SERPL-MCNC: 132 MG/DL

## 2025-03-11 PROCEDURE — 3077F SYST BP >= 140 MM HG: CPT | Performed by: PHYSICIAN ASSISTANT

## 2025-03-11 PROCEDURE — 36415 COLL VENOUS BLD VENIPUNCTURE: CPT | Performed by: PHYSICIAN ASSISTANT

## 2025-03-11 PROCEDURE — 3078F DIAST BP <80 MM HG: CPT | Performed by: PHYSICIAN ASSISTANT

## 2025-03-11 PROCEDURE — 1126F AMNT PAIN NOTED NONE PRSNT: CPT | Performed by: PHYSICIAN ASSISTANT

## 2025-03-11 PROCEDURE — 99214 OFFICE O/P EST MOD 30 MIN: CPT | Performed by: PHYSICIAN ASSISTANT

## 2025-03-11 PROCEDURE — 83036 HEMOGLOBIN GLYCOSYLATED A1C: CPT | Performed by: PHYSICIAN ASSISTANT

## 2025-03-11 PROCEDURE — 80061 LIPID PANEL: CPT | Performed by: PHYSICIAN ASSISTANT

## 2025-03-11 ASSESSMENT — PAIN SCALES - GENERAL: PAINLEVEL_OUTOF10: NO PAIN (0)

## 2025-03-11 NOTE — PROGRESS NOTES
Assessment & Plan     Type 2 diabetes mellitus with other circulatory complication, without long-term current use of insulin (H)  A1c returned increased back to 11%. I have asked the patient to schedule with diabetic education. He has communicated following poor diet in the past and I suspect that the increase in the A1c is due to lifestyle choices. I will start the patient on jardiance and have him increase the insulin. Patient was reminded on the importance of taking all of his medications. Next follow up in 3 months.   - insulin glargine (LANTUS PEN) 100 UNIT/ML pen; Inject 30 Units subcutaneously at bedtime.  - Hemoglobin A1c; Future  - Hemoglobin A1c    Hyperlipidemia LDL goal <70  The 10-year ASCVD risk score (Gena FELIX, et al., 2019) is: 63.2%    Values used to calculate the score:      Age: 74 years      Sex: Male      Is Non- : No      Diabetic: Yes      Tobacco smoker: No      Systolic Blood Pressure: 151 mmHg      Is BP treated: Yes      HDL Cholesterol: 50 mg/dL      Total Cholesterol: 253 mg/dL  Patient informs me that he stopped his statin medication. I reviewed why this medication has been prescribed and the increased risk for heart attack/stroke without it. He was agreeable to restarting this. Repeat the lipids at the next office visit.   - Lipid Profile (Chol, Trig, HDL, LDL calc); Future  - Lipid Profile (Chol, Trig, HDL, LDL calc)    Atrial fibrillation, unspecified type (H)  Previous cardiology visit was on 04/15/24. Patient will continue to follow up as directed. No new concerns at this time.     Chronic kidney disease, stage 3a (H)  Patient was started on jardiance to help improve both his diabetes and CKD. Patient will reach out if this is not covered or tolerated.     Trigger finger, acquired  Patient was quite upset today as he says that his right middle finger was sticking in a flexed position. He said that most mornings he has to manually reduce the finger. We  "discussed trigger finger and the need for an orthopedic consult. He was agreeable to this plan.   - Orthopedic  Referral; Future    Subjective   Genaro is a 74 year old, presenting for the following health issues:  Diabetes        12/10/2024    10:46 AM   Additional Questions   Roomed by Angela SHEARER   Accompanied by self       Via the Health Maintenance questionnaire, the patient has reported the following services have been completed -Eye Exam: [facility] 2024-03-01, this information has been sent to the abstraction team.  History of Present Illness       Diabetes:   He presents for follow up of diabetes.  He is checking home blood glucose one time daily.   He checks blood glucose before meals.  Blood glucose is sometimes over 200 and never under 70.  When his blood glucose is low, the patient is asymptomatic for confusion, blurred vision, lethargy and reports not feeling dizzy, shaky, or weak.  He is concerned about blood sugar frequently over 200 and other.   He is having weight loss.  The patient has had a diabetic eye exam in the last 12 months. Eye exam performed on 03/2024. Location of last eye exam unsure.        He eats 0-1 servings of fruits and vegetables daily.He consumes 0 sweetened beverage(s) daily.He exercises with enough effort to increase his heart rate 9 or less minutes per day.  He exercises with enough effort to increase his heart rate 3 or less days per week.   He is taking medications regularly.      Trigger finger on right hand middle finger     Cholesterol lab test     Review of Systems  Constitutional, HEENT, cardiovascular, pulmonary, gi and gu systems are negative, except as otherwise noted.      Objective    BP (!) 148/85   Pulse 74   Temp 97.2  F (36.2  C) (Temporal)   Resp 15   Ht 1.83 m (6' 0.05\")   Wt 85 kg (187 lb 8 oz)   SpO2 96%   BMI 25.40 kg/m    Body mass index is 25.4 kg/m .  Physical Exam   GENERAL: alert and no distress  RESP: lungs clear to auscultation - " no rales, rhonchi or wheezes  CV: regular rate and rhythm, normal S1 S2, no S3 or S4, no murmur, click or rub, no peripheral edema  MS: no gross musculoskeletal defects noted, no edema  NEURO: Normal strength and tone, mentation intact and speech normal  PSYCH: mentation appears normal, affect normal/bright    Results for orders placed or performed in visit on 03/11/25   Hemoglobin A1c     Status: Abnormal   Result Value Ref Range    Estimated Average Glucose 269 (H) <117 mg/dL    Hemoglobin A1C 11.0 (H) 0.0 - 5.6 %    Narrative    Results confirmed by repeat test.    Lipid Profile (Chol, Trig, HDL, LDL calc)     Status: Abnormal   Result Value Ref Range    Cholesterol 253 (H) <200 mg/dL    Triglycerides 132 <150 mg/dL    Direct Measure HDL 50 >=40 mg/dL    LDL Cholesterol Calculated 177 (H) <100 mg/dL    Non HDL Cholesterol 203 (H) <130 mg/dL    Patient Fasting > 8hrs? No     Narrative    Cholesterol  Desirable: < 200 mg/dL  Borderline High: 200 - 239 mg/dL  High: >= 240 mg/dL    Triglycerides  Normal: < 150 mg/dL  Borderline High: 150 - 199 mg/dL  High: 200-499 mg/dL  Very High: >= 500 mg/dL    Direct Measure HDL  Female: >= 50 mg/dL   Male: >= 40 mg/dL    LDL Cholesterol  Desirable: < 100 mg/dL  Above Desirable: 100 - 129 mg/dL   Borderline High: 130 - 159 mg/dL   High:  160 - 189 mg/dL   Very High: >= 190 mg/dL    Non HDL Cholesterol  Desirable: < 130 mg/dL  Above Desirable: 130 - 159 mg/dL  Borderline High: 160 - 189 mg/dL  High: 190 - 219 mg/dL  Very High: >= 220 mg/dL         Signed Electronically by: Rigo Rivas PA-C

## 2025-03-12 DIAGNOSIS — E11.59 TYPE 2 DIABETES MELLITUS WITH OTHER CIRCULATORY COMPLICATION, WITHOUT LONG-TERM CURRENT USE OF INSULIN (H): ICD-10-CM

## 2025-03-12 RX ORDER — METFORMIN HYDROCHLORIDE 500 MG/1
1000 TABLET, EXTENDED RELEASE ORAL 2 TIMES DAILY WITH MEALS
Qty: 360 TABLET | Refills: 0 | Status: SHIPPED | OUTPATIENT
Start: 2025-03-12

## 2025-03-12 RX ORDER — GLIPIZIDE 10 MG/1
10 TABLET ORAL
Qty: 180 TABLET | Refills: 0 | Status: SHIPPED | OUTPATIENT
Start: 2025-03-12

## 2025-03-12 NOTE — RESULT ENCOUNTER NOTE
Please call patient to inform him that his A1c has increased from 8.4% to 11%. I would like him to meet with the diabetic education staff to work on modifying diet and lifestyle to lower sugars. Please help him schedule this.    He should continue to take all of his other medications including:  Metformin  Glipizide  Rosuvastatin  Metoprolol    I am going to send out a medication called jardiance for him to take in addition with the others. This helps to lower blood sugar through the urinary system.     He can increase his long acting insulin by 2 units every 2-3 days to a maximum of 36 units at bedtime. He should monitor sugars closely.    Next diabetic follow up in 3 months.    Rigo Rivas PA-C on 3/12/2025 at 12:34 PM

## 2025-03-18 NOTE — PROGRESS NOTES
Genaro Barbosa  :  1950  DOS: 3/19/2025  MRN: [id number]  PCP: Rigo Rivas    Sports Medicine Clinic Visit      HPI  Genaro Barbosa is a 74 year old male who is seen in consultation at the request of  Rigo Rivas PA-C presenting with right long finger triggering.    - Mechanism of Injury:    - No inciting injury  - Pertinent history and prior evaluations:    - 3/11/2025 with Rigo Rivas PA-C:    - 11% A1C as of 3/11/2025    - Pain Character:    - Location:  right long finger  - Character: Triggering, pain at the A1 pulley  - Duration: Months  - Course: Worsening  - Endorses:    -Pain and triggering at the A1 pulley, locking of the finger in the morning.  - Denies:    - swelling, instability, numbness, tingling, radicular shooting pain  - Alleviating factors:    -Rest  - Aggravating factors:    - getting stuck in flexion, sleeping position    - Patient Goals:    - get a formal diagnosis, discuss treatment options  - Social History:   -Active with his hands frequently      Review of Systems  Musculoskeletal: as above  Remainder of review of systems is negative including constitutional, CV, pulmonary, GI, Skin and Neurologic except as noted in HPI or medical history.    Past Medical History:   Diagnosis Date    Coronary artery disease     stents x8    Diabetic eye exam (H) 10/11/12    [facility]    Fatty liver     Gallstones 11    hospitalized with RUQ pain    History of tobacco use:  - , /2 ppd 10/3/2011    Hyperlipidemia LDL goal < 100     Hypertension goal BP (blood pressure) < 130/80     Pyloric stenosis, congenital (H)     Type 2 diabetes, HbA1c goal < 7% (H)      Past Surgical History:   Procedure Laterality Date    CARDIAC SURGERY      Angioplasty with stenting    HC PTA EXTREMITY ARTERY, EACH ADDITIONAL      PHACOEMULSIFICATION WITH STANDARD INTRAOCULAR LENS IMPLANT Right 2023    Procedure: Phacoemulsification with standard intraocular lens implant,  Right;  Surgeon: Ben Gonzalez MD;  Location: [facility] OR    PHACOEMULSIFICATION WITH STANDARD INTRAOCULAR LENS IMPLANT Left 1/4/2024    Procedure: Phacoemulsification with standard intraocular lens implant left;  Surgeon: Sharif Siu MD;  Location: [facility] OR    pyloric stenosis       Family History   Problem Relation Age of Onset    C.A.D. Mother     Hypertension Mother     Circulatory Mother         blood clots    Heart Disease Mother         heart attack    Lipids Mother     Diabetes Father     Genitourinary Problems Brother         kidnety problems; dialysis    Asthma Son     Family History Negative No family hx of     Cerebrovascular Disease No family hx of     Breast Cancer No family hx of     Cancer - colorectal No family hx of     Prostate Cancer No family hx of     Alzheimer Disease No family hx of     Arthritis No family hx of     Blood Disease No family hx of     Cancer No family hx of     Eye Disorder No family hx of     Gastrointestinal Disease No family hx of     Musculoskeletal Disorder No family hx of     Neurologic Disorder No family hx of     Respiratory No family hx of     Thyroid Disease No family hx of     Alcohol/Drug No family hx of     Allergies No family hx of     Anesthesia Reaction No family hx of     Cardiovascular No family hx of     Congenital Anomalies No family hx of     Connective Tissue Disorder No family hx of     Depression No family hx of     Endocrine Disease No family hx of     Gynecology No family hx of     Obesity No family hx of     Osteoporosis No family hx of     Psychotic Disorder No family hx of     Hearing Loss No family hx of          Objective  There were no vitals taken for this visit.    General: healthy, alert and in no acute distress.    HEENT: no scleral icterus or conjunctival erythema.   Skin: no suspicious lesions or rash. No jaundice.   CV: regular rhythm by palpation, 2+ distal pulses.  Resp: normal respiratory effort without conversational dyspnea.    Psych: normal mood and affect.    Gait: nonantalgic, appropriate coordination and balance.     Neuro:        - Sensation to light touch:    - Intact throughout the BUE including all peripheral nerve distributions.     MSK - Wrist/Hand:        - Inspection:    - No significant swelling, erythema, warmth, ecchymosis, lesion, or atrophy noted.        - ROM:    - Full AROM/PROM with triggering of the right long finger       - Palpation:    - TTP at the A1 pulley of the right long finger.   - NTTP elsewhere.        - Strength:  (*antalgic)   - FDS/FDP  5           - Special tests:        - Trigger:  Pos      Radiology  I independently reviewed the available relevant imaging in the chart with my interpretations as above in HPI.     I independently reviewed today's new relevant imaging, with the following interpretation:  - XR R hand 3/19/2025 shows mild degenerative changes in multiple IP joints, no acute fractures or dislocations.      Assessment  1. Trigger finger, acquired        Plan  Genaro Barbosa is a pleasant 74 year old male that presents with chronic right long finger pain and clicking.  On exam, he does have TTP over the A1 pulley with a palpable nodule and obvious triggering. History and physical exam appear most consistent with an acquired trigger finger of the right long finger.     He does have a significant medical comorbidity of diabetes with a recent HbA1c of 11.0%.  He is working with his PCP on medication changes for better control.     We discussed the nature of the condition and available treatment options, and mutually agreed upon the following plan:    - Imaging:          - Reviewed and independently interpreted the relevant imaging in the chart, including any imaging ordered for today's clinic.  - Reviewed results and images with patient.   - Medications:          - Discussed pharmacologic options for pain relief.   - May use NSAIDs (Ibuprofen, Naproxen) or Acetaminophen (Tylenol) as needed for  pain control.   - Do not take these if previously advised to avoid them for other medical conditions.  - May also use topical medications such as lidocaine, IcyHot, BioFreeze, or Voltaren gel as needed for pain control.    - Voltaren gel is an anti-inflammatory cream that may be used up to 4 times per day over the painful area.   - Injections:          - Discussed possible injection options and alternatives.    - Injection options include: Corticosteroid injection of the A1 pulley/flexor tendon sheath.  This will be contraindicated until his HbA1c is below 8.0%  - Therapy:          - Discussed the benefits of therapy vs home exercise program for optimization of range of motion, flexibility, strength, stability and function.   - Preference is for a home exercise program.   - Home Exercise Program given today in clinic and recommendation given to perform HEP daily and after exacerbations.  - Modalities:          - May use ice, heat, massage or other modalities as needed.   - Bracing:          - Discussed bracing options and recommend using an oval 8 splint at night to keep the finger straight and decrease morning locking symptoms.  Brace given today in clinic.    - Surgery:          - Discussed non-operative and operative treatment options for the patient's condition.  He prefers trigger finger release procedure.  Discussed with Dr. Julian.  Orthopedic  referral placed but surgery cannot be performed until his HbA1c is less than 8.5%.  - Activity:          - Encouraged to remain active and participate in regular activities as symptoms allow.   Avoid or modify exacerbating activities as needed.  - Follow up:          - He may follow-up with Dr. Julian when the A1c is below 8.5% to consider trigger finger release if he still prefers surgery and is symptomatic.  I would be happy to see him to consider the injection if he would like, when HbA1c is below 8.0%.  - May follow up sooner for new/worsening symptoms.  -  May contact clinic by phone or MyChart for questions or concerns.       Rigo Kirkpatrick DO, CAQSM  [facility] - Department of Orthopedic Surgery       Disclaimer:  This note was prepared and written using Dragon Medical dictation software. As a result, there may be errors in the script that have gone undetected. Please consider this when interpreting the information in this note.

## 2025-03-19 ENCOUNTER — OFFICE VISIT (OUTPATIENT)
Dept: ORTHOPEDICS | Facility: CLINIC | Age: 75
End: 2025-03-19
Attending: PHYSICIAN ASSISTANT
Payer: COMMERCIAL

## 2025-03-19 ENCOUNTER — ANCILLARY PROCEDURE (OUTPATIENT)
Dept: GENERAL RADIOLOGY | Facility: CLINIC | Age: 75
End: 2025-03-19
Attending: STUDENT IN AN ORGANIZED HEALTH CARE EDUCATION/TRAINING PROGRAM
Payer: COMMERCIAL

## 2025-03-19 DIAGNOSIS — M65.331 TRIGGER MIDDLE FINGER OF RIGHT HAND: Primary | ICD-10-CM

## 2025-03-19 DIAGNOSIS — M65.331 TRIGGER MIDDLE FINGER OF RIGHT HAND: ICD-10-CM

## 2025-03-19 DIAGNOSIS — M65.30 TRIGGER FINGER, ACQUIRED: Primary | ICD-10-CM

## 2025-03-19 PROCEDURE — 73130 X-RAY EXAM OF HAND: CPT | Mod: TC | Performed by: RADIOLOGY

## 2025-03-19 PROCEDURE — 99204 OFFICE O/P NEW MOD 45 MIN: CPT | Performed by: STUDENT IN AN ORGANIZED HEALTH CARE EDUCATION/TRAINING PROGRAM

## 2025-03-19 NOTE — LETTER
3/19/2025      Genaro Barbosa  [address]  [facility] [address]      Dear Colleague,    Thank you for referring your patient, Genaro Barbosa, to the [facility] [place]. Please see a copy of my visit note below.    Genaro Barbosa  :  1950  DOS: 3/19/2025  MRN: [id number]  PCP: Rigo Rivas    Sports Medicine Clinic Visit      HPI  Genaro Barbosa is a 74 year old male who is seen in consultation at the request of  Rigo Rivas PA-C presenting with right long finger triggering.    - Mechanism of Injury:    - No inciting injury  - Pertinent history and prior evaluations:    - 3/11/2025 with Rigo Rivas PA-C:    - 11% A1C as of 3/11/2025    - Pain Character:    - Location:  right long finger  - Character: Triggering, pain at the A1 pulley  - Duration: Months  - Course: Worsening  - Endorses:    -Pain and triggering at the A1 pulley, locking of the finger in the morning.  - Denies:    - swelling, instability, numbness, tingling, radicular shooting pain  - Alleviating factors:    -Rest  - Aggravating factors:    - getting stuck in flexion, sleeping position    - Patient Goals:    - get a formal diagnosis, discuss treatment options  - Social History:   -Active with his hands frequently      Review of Systems  Musculoskeletal: as above  Remainder of review of systems is negative including constitutional, CV, pulmonary, GI, Skin and Neurologic except as noted in HPI or medical history.    Past Medical History:   Diagnosis Date     Coronary artery disease     stents x8     Diabetic eye exam (H) 10/11/12    [facility]     Fatty liver      Gallstones 11    hospitalized with RUQ pain     History of tobacco use:  - ,  ppd 10/3/2011     Hyperlipidemia LDL goal < 100      Hypertension goal BP (blood pressure) < 130/80      Pyloric stenosis, congenital (H)      Type 2 diabetes, HbA1c goal < 7% (H)      Past Surgical History:   Procedure  Laterality Date     CARDIAC SURGERY      Angioplasty with stenting     HC PTA EXTREMITY ARTERY, EACH ADDITIONAL  1991     PHACOEMULSIFICATION WITH STANDARD INTRAOCULAR LENS IMPLANT Right 12/21/2023    Procedure: Phacoemulsification with standard intraocular lens implant, Right;  Surgeon: Ben Gonzalez MD;  Location: [facility] OR     PHACOEMULSIFICATION WITH STANDARD INTRAOCULAR LENS IMPLANT Left 1/4/2024    Procedure: Phacoemulsification with standard intraocular lens implant left;  Surgeon: Sharif Siu MD;  Location: [facility] OR     pyloric stenosis       Family History   Problem Relation Age of Onset     C.A.D. Mother      Hypertension Mother      Circulatory Mother         blood clots     Heart Disease Mother         heart attack     Lipids Mother      Diabetes Father      Genitourinary Problems Brother         kidnety problems; dialysis     Asthma Son      Family History Negative No family hx of      Cerebrovascular Disease No family hx of      Breast Cancer No family hx of      Cancer - colorectal No family hx of      Prostate Cancer No family hx of      Alzheimer Disease No family hx of      Arthritis No family hx of      Blood Disease No family hx of      Cancer No family hx of      Eye Disorder No family hx of      Gastrointestinal Disease No family hx of      Musculoskeletal Disorder No family hx of      Neurologic Disorder No family hx of      Respiratory No family hx of      Thyroid Disease No family hx of      Alcohol/Drug No family hx of      Allergies No family hx of      Anesthesia Reaction No family hx of      Cardiovascular No family hx of      Congenital Anomalies No family hx of      Connective Tissue Disorder No family hx of      Depression No family hx of      Endocrine Disease No family hx of      Gynecology No family hx of      Obesity No family hx of      Osteoporosis No family hx of      Psychotic Disorder No family hx of      Hearing Loss No family hx of          Objective  There were no  vitals taken for this visit.    General: healthy, alert and in no acute distress.    HEENT: no scleral icterus or conjunctival erythema.   Skin: no suspicious lesions or rash. No jaundice.   CV: regular rhythm by palpation, 2+ distal pulses.  Resp: normal respiratory effort without conversational dyspnea.   Psych: normal mood and affect.    Gait: nonantalgic, appropriate coordination and balance.     Neuro:        - Sensation to light touch:    - Intact throughout the BUE including all peripheral nerve distributions.     MSK - Wrist/Hand:        - Inspection:    - No significant swelling, erythema, warmth, ecchymosis, lesion, or atrophy noted.        - ROM:    - Full AROM/PROM with triggering of the right long finger       - Palpation:    - TTP at the A1 pulley of the right long finger.   - NTTP elsewhere.        - Strength:  (*antalgic)   - FDS/FDP  5           - Special tests:        - Trigger:  Pos      Radiology  I independently reviewed the available relevant imaging in the chart with my interpretations as above in HPI.     I independently reviewed today's new relevant imaging, with the following interpretation:  - XR R hand 3/19/2025 shows mild degenerative changes in multiple IP joints, no acute fractures or dislocations.      Assessment  1. Trigger finger, acquired        Plan  Genaro Barbosa is a pleasant 74 year old male that presents with chronic right long finger pain and clicking.  On exam, he does have TTP over the A1 pulley with a palpable nodule and obvious triggering. History and physical exam appear most consistent with an acquired trigger finger of the right long finger.     He does have a significant medical comorbidity of diabetes with a recent HbA1c of 11.0%.  He is working with his PCP on medication changes for better control.     We discussed the nature of the condition and available treatment options, and mutually agreed upon the following plan:    - Imaging:          - Reviewed and  independently interpreted the relevant imaging in the chart, including any imaging ordered for today's clinic.  - Reviewed results and images with patient.   - Medications:          - Discussed pharmacologic options for pain relief.   - May use NSAIDs (Ibuprofen, Naproxen) or Acetaminophen (Tylenol) as needed for pain control.   - Do not take these if previously advised to avoid them for other medical conditions.  - May also use topical medications such as lidocaine, IcyHot, BioFreeze, or Voltaren gel as needed for pain control.    - Voltaren gel is an anti-inflammatory cream that may be used up to 4 times per day over the painful area.   - Injections:          - Discussed possible injection options and alternatives.    - Injection options include: Corticosteroid injection of the A1 pulley/flexor tendon sheath.  This will be contraindicated until his HbA1c is below 8.0%  - Therapy:          - Discussed the benefits of therapy vs home exercise program for optimization of range of motion, flexibility, strength, stability and function.   - Preference is for a home exercise program.   - Home Exercise Program given today in clinic and recommendation given to perform HEP daily and after exacerbations.  - Modalities:          - May use ice, heat, massage or other modalities as needed.   - Bracing:          - Discussed bracing options and recommend using an oval 8 splint at night to keep the finger straight and decrease morning locking symptoms.  Brace given today in clinic.    - Surgery:          - Discussed non-operative and operative treatment options for the patient's condition.  He prefers trigger finger release procedure.  Discussed with Dr. Julian.  Orthopedic  referral placed but surgery cannot be performed until his HbA1c is less than 8.5%.  - Activity:          - Encouraged to remain active and participate in regular activities as symptoms allow.   Avoid or modify exacerbating activities as needed.  -  Follow up:          - He may follow-up with Dr. Julian when the A1c is below 8.5% to consider trigger finger release if he still prefers surgery and is symptomatic.  I would be happy to see him to consider the injection if he would like, when HbA1c is below 8.0%.  - May follow up sooner for new/worsening symptoms.  - May contact clinic by phone or MyChart for questions or concerns.       Rigo Kirkpatrick DO, CAQSM  [facility]  [facility] - Department of Orthopedic Surgery       Disclaimer:  This note was prepared and written using Dragon Medical dictation software. As a result, there may be errors in the script that have gone undetected. Please consider this when interpreting the information in this note.        Again, thank you for allowing me to participate in the care of your patient.        Sincerely,        Rigo Kirkpatrick DO    Electronically signed

## 2025-03-20 ENCOUNTER — PATIENT OUTREACH (OUTPATIENT)
Dept: CARE COORDINATION | Facility: CLINIC | Age: 75
End: 2025-03-20
Payer: COMMERCIAL

## 2025-03-20 DIAGNOSIS — E11.59 TYPE 2 DIABETES MELLITUS WITH OTHER CIRCULATORY COMPLICATION, WITHOUT LONG-TERM CURRENT USE OF INSULIN (H): Primary | ICD-10-CM

## 2025-06-25 ENCOUNTER — LAB (OUTPATIENT)
Dept: LAB | Facility: OTHER | Age: 75
End: 2025-06-25
Payer: COMMERCIAL

## 2025-06-25 ENCOUNTER — DOCUMENTATION ONLY (OUTPATIENT)
Dept: FAMILY MEDICINE | Facility: OTHER | Age: 75
End: 2025-06-25

## 2025-06-25 DIAGNOSIS — E11.59 TYPE 2 DIABETES MELLITUS WITH OTHER CIRCULATORY COMPLICATION, WITHOUT LONG-TERM CURRENT USE OF INSULIN (H): ICD-10-CM

## 2025-06-25 DIAGNOSIS — E11.65 UNCONTROLLED TYPE 2 DIABETES MELLITUS WITH HYPERGLYCEMIA (H): ICD-10-CM

## 2025-06-25 LAB
EST. AVERAGE GLUCOSE BLD GHB EST-MCNC: 240 MG/DL
HBA1C MFR BLD: 10 % (ref 0–5.6)
HOLD SPECIMEN: NORMAL

## 2025-06-25 PROCEDURE — 36415 COLL VENOUS BLD VENIPUNCTURE: CPT

## 2025-06-25 PROCEDURE — 3048F LDL-C <100 MG/DL: CPT

## 2025-06-25 PROCEDURE — 80061 LIPID PANEL: CPT

## 2025-06-25 PROCEDURE — 3046F HEMOGLOBIN A1C LEVEL >9.0%: CPT

## 2025-06-25 PROCEDURE — 83036 HEMOGLOBIN GLYCOSYLATED A1C: CPT

## 2025-06-26 ENCOUNTER — RESULTS FOLLOW-UP (OUTPATIENT)
Dept: FAMILY MEDICINE | Facility: OTHER | Age: 75
End: 2025-06-26

## 2025-06-26 DIAGNOSIS — E11.65 UNCONTROLLED TYPE 2 DIABETES MELLITUS WITH HYPERGLYCEMIA (H): Primary | ICD-10-CM

## 2025-06-26 DIAGNOSIS — E78.5 HYPERLIPIDEMIA LDL GOAL <70: ICD-10-CM

## 2025-06-26 DIAGNOSIS — I25.10 CORONARY ARTERY DISEASE INVOLVING NATIVE CORONARY ARTERY OF NATIVE HEART WITHOUT ANGINA PECTORIS: ICD-10-CM

## 2025-06-26 RX ORDER — ROSUVASTATIN CALCIUM 40 MG/1
40 TABLET, COATED ORAL DAILY
Qty: 90 TABLET | Refills: 1 | Status: SHIPPED | OUTPATIENT
Start: 2025-06-26

## 2025-06-27 LAB
CHOLEST SERPL-MCNC: 132 MG/DL
HDLC SERPL-MCNC: 37 MG/DL
LDLC SERPL CALC-MCNC: 63 MG/DL
NONHDLC SERPL-MCNC: 95 MG/DL
TRIGL SERPL-MCNC: 158 MG/DL

## 2025-06-30 ENCOUNTER — PATIENT OUTREACH (OUTPATIENT)
Dept: CARE COORDINATION | Facility: CLINIC | Age: 75
End: 2025-06-30
Payer: COMMERCIAL

## 2025-07-25 ENCOUNTER — MYC MEDICAL ADVICE (OUTPATIENT)
Dept: FAMILY MEDICINE | Facility: OTHER | Age: 75
End: 2025-07-25
Payer: COMMERCIAL

## (undated) DEVICE — SOL NACL 0.9% IRRIG 1000ML BOTTLE 07138-09

## (undated) DEVICE — TAPE TRANSPORE 1/2"

## (undated) DEVICE — GLOVE PROTEGRITY 7.5 LATEX

## (undated) DEVICE — SOL WATER IRRIG 1000ML BOTTLE 07139-09